# Patient Record
Sex: FEMALE | Race: WHITE | NOT HISPANIC OR LATINO | URBAN - METROPOLITAN AREA
[De-identification: names, ages, dates, MRNs, and addresses within clinical notes are randomized per-mention and may not be internally consistent; named-entity substitution may affect disease eponyms.]

---

## 2017-11-06 ENCOUNTER — INPATIENT (INPATIENT)
Facility: HOSPITAL | Age: 72
LOS: 2 days | Discharge: HOME | DRG: 872 | End: 2017-11-09
Attending: HOSPITALIST | Admitting: HOSPITALIST
Payer: MEDICARE

## 2017-11-06 VITALS
TEMPERATURE: 98 F | SYSTOLIC BLOOD PRESSURE: 172 MMHG | HEART RATE: 101 BPM | DIASTOLIC BLOOD PRESSURE: 90 MMHG | OXYGEN SATURATION: 98 % | WEIGHT: 257.94 LBS | RESPIRATION RATE: 18 BRPM

## 2017-11-06 DIAGNOSIS — S61.401A UNSPECIFIED OPEN WOUND OF RIGHT HAND, INITIAL ENCOUNTER: ICD-10-CM

## 2017-11-06 DIAGNOSIS — Z29.9 ENCOUNTER FOR PROPHYLACTIC MEASURES, UNSPECIFIED: ICD-10-CM

## 2017-11-06 DIAGNOSIS — R63.8 OTHER SYMPTOMS AND SIGNS CONCERNING FOOD AND FLUID INTAKE: ICD-10-CM

## 2017-11-06 DIAGNOSIS — N17.9 ACUTE KIDNEY FAILURE, UNSPECIFIED: ICD-10-CM
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
## 2017-11-06 DIAGNOSIS — M17.0 BILATERAL PRIMARY OSTEOARTHRITIS OF KNEE: ICD-10-CM

## 2017-11-06 DIAGNOSIS — I10 ESSENTIAL (PRIMARY) HYPERTENSION: ICD-10-CM

## 2017-11-06 DIAGNOSIS — L02.519 CUTANEOUS ABSCESS OF UNSPECIFIED HAND: ICD-10-CM

## 2017-11-06 DIAGNOSIS — Z90.710 ACQUIRED ABSENCE OF BOTH CERVIX AND UTERUS: Chronic | ICD-10-CM
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
## 2017-11-06 LAB
ANION GAP SERPL CALC-SCNC: 16 MMOL/L — SIGNIFICANT CHANGE UP (ref 5–17)
BUN SERPL-MCNC: 31 MG/DL — HIGH (ref 7–23)
CALCIUM SERPL-MCNC: 9.3 MG/DL — SIGNIFICANT CHANGE UP (ref 8.4–10.5)
CHLORIDE SERPL-SCNC: 101 MMOL/L — SIGNIFICANT CHANGE UP (ref 96–108)
CO2 SERPL-SCNC: 21 MMOL/L — LOW (ref 22–31)
CREAT SERPL-MCNC: 1.85 MG/DL — HIGH (ref 0.5–1.3)
GLUCOSE SERPL-MCNC: 104 MG/DL — HIGH (ref 70–99)
HCT VFR BLD CALC: 35.2 % — SIGNIFICANT CHANGE UP (ref 34.5–45)
HGB BLD-MCNC: 11.3 G/DL — LOW (ref 11.5–15.5)
MCHC RBC-ENTMCNC: 27.9 PG — SIGNIFICANT CHANGE UP (ref 27–34)
MCHC RBC-ENTMCNC: 32.1 G/DL — SIGNIFICANT CHANGE UP (ref 32–36)
MCV RBC AUTO: 86.9 FL — SIGNIFICANT CHANGE UP (ref 80–100)
PLATELET # BLD AUTO: 175 K/UL — SIGNIFICANT CHANGE UP (ref 150–400)
POTASSIUM SERPL-MCNC: 5.1 MMOL/L — SIGNIFICANT CHANGE UP (ref 3.5–5.3)
POTASSIUM SERPL-SCNC: 5.1 MMOL/L — SIGNIFICANT CHANGE UP (ref 3.5–5.3)
RBC # BLD: 4.05 M/UL — SIGNIFICANT CHANGE UP (ref 3.8–5.2)
RBC # FLD: 12.7 % — SIGNIFICANT CHANGE UP (ref 10.3–16.9)
SODIUM SERPL-SCNC: 138 MMOL/L — SIGNIFICANT CHANGE UP (ref 135–145)
WBC # BLD: 14 K/UL — HIGH (ref 3.8–10.5)
WBC # FLD AUTO: 14 K/UL — HIGH (ref 3.8–10.5)

## 2017-11-06 PROCEDURE — 99285 EMERGENCY DEPT VISIT HI MDM: CPT | Mod: GC

## 2017-11-06 PROCEDURE — 76882 US LMTD JT/FCL EVL NVASC XTR: CPT | Mod: 26,LT

## 2017-11-06 PROCEDURE — 73130 X-RAY EXAM OF HAND: CPT | Mod: 26,LT

## 2017-11-06 RX ORDER — MORPHINE SULFATE 50 MG/1
2 CAPSULE, EXTENDED RELEASE ORAL ONCE
Qty: 0 | Refills: 0 | Status: DISCONTINUED | OUTPATIENT
Start: 2017-11-06 | End: 2017-11-06

## 2017-11-06 RX ORDER — SODIUM CHLORIDE 9 MG/ML
1000 INJECTION INTRAMUSCULAR; INTRAVENOUS; SUBCUTANEOUS
Qty: 0 | Refills: 0 | Status: DISCONTINUED | OUTPATIENT
Start: 2017-11-06 | End: 2017-11-07

## 2017-11-06 RX ORDER — HEPARIN SODIUM 5000 [USP'U]/ML
5000 INJECTION INTRAVENOUS; SUBCUTANEOUS EVERY 8 HOURS
Qty: 0 | Refills: 0 | Status: DISCONTINUED | OUTPATIENT
Start: 2017-11-06 | End: 2017-11-09

## 2017-11-06 RX ORDER — VANCOMYCIN HCL 1 G
1750 VIAL (EA) INTRAVENOUS ONCE
Qty: 0 | Refills: 0 | Status: DISCONTINUED | OUTPATIENT
Start: 2017-11-06 | End: 2017-11-06

## 2017-11-06 RX ORDER — VANCOMYCIN HCL 1 G
1500 VIAL (EA) INTRAVENOUS ONCE
Qty: 0 | Refills: 0 | Status: COMPLETED | OUTPATIENT
Start: 2017-11-06 | End: 2017-11-06

## 2017-11-06 RX ORDER — ACETAMINOPHEN 500 MG
650 TABLET ORAL EVERY 6 HOURS
Qty: 0 | Refills: 0 | Status: DISCONTINUED | OUTPATIENT
Start: 2017-11-06 | End: 2017-11-09

## 2017-11-06 RX ORDER — SODIUM CHLORIDE 9 MG/ML
1000 INJECTION INTRAMUSCULAR; INTRAVENOUS; SUBCUTANEOUS
Qty: 0 | Refills: 0 | Status: DISCONTINUED | OUTPATIENT
Start: 2017-11-06 | End: 2017-11-08

## 2017-11-06 RX ORDER — VANCOMYCIN HCL 1 G
1500 VIAL (EA) INTRAVENOUS ONCE
Qty: 0 | Refills: 0 | Status: DISCONTINUED | OUTPATIENT
Start: 2017-11-06 | End: 2017-11-06

## 2017-11-06 RX ORDER — MORPHINE SULFATE 50 MG/1
2 CAPSULE, EXTENDED RELEASE ORAL EVERY 6 HOURS
Qty: 0 | Refills: 0 | Status: DISCONTINUED | OUTPATIENT
Start: 2017-11-06 | End: 2017-11-07

## 2017-11-06 RX ORDER — LABETALOL HCL 100 MG
200 TABLET ORAL
Qty: 0 | Refills: 0 | Status: DISCONTINUED | OUTPATIENT
Start: 2017-11-06 | End: 2017-11-09

## 2017-11-06 RX ORDER — PROCHLORPERAZINE MALEATE 5 MG
10 TABLET ORAL ONCE
Qty: 0 | Refills: 0 | Status: COMPLETED | OUTPATIENT
Start: 2017-11-06 | End: 2017-11-06

## 2017-11-06 RX ADMIN — MORPHINE SULFATE 2 MILLIGRAM(S): 50 CAPSULE, EXTENDED RELEASE ORAL at 18:31

## 2017-11-06 RX ADMIN — SODIUM CHLORIDE 1000 MILLILITER(S): 9 INJECTION INTRAMUSCULAR; INTRAVENOUS; SUBCUTANEOUS at 18:50

## 2017-11-06 RX ADMIN — Medication 10 MILLIGRAM(S): at 20:15

## 2017-11-06 RX ADMIN — Medication 300 MILLIGRAM(S): at 18:50

## 2017-11-06 NOTE — H&P ADULT - ASSESSMENT
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 She is hemodynamically stable for admission to [facility] for IV antibiotics.

## 2017-11-06 NOTE — ED PROVIDER NOTE - MEDICAL DECISION MAKING DETAILS
WBC 14. US shows multiple fluid collections in hand.  Will likely need admission for IV antibiotics and possible OR for abscess drainage.

## 2017-11-06 NOTE — H&P ADULT - NSHPPHYSICALEXAM_GEN_ALL_CORE
General: NAD, lying in bed, malodorous   HEENT: PERRL, EOMI, MMM  Neck: supple, no JVD  Resp: b/l CTA, no wheezes/rhonchi/crackles   CVS: S1, S2 RRR no murmurs/rubs/gallops  Abd: obese, soft, nt/nd +BS   Extremities: states she has chronic LE edema 2+ edema, chronic venous stasis changes present b/l. wwp, moving all extremities. LUE 4-/5 RUE 5/5, LLE 4-/5, RLE 5/5. L hand: L hand generalized swelling with fluctuance to mid palm, able to flex fingers but not make fist 2/2 pain and swelling. +open wound, not actively draining at this time, +erythema, wwp, ttp. +dorsal hand swelling w/o erythema, mild streaking up forearm.  No crepitus.  No joint involvement, full ROM of wrist.   Neuro: aaox3, no sensory deficits, CN II-XII grossly in tact, motor exam as noted above. Gait deferred in ER. General: NAD, lying in bed, malodorous and mildly anxious   HEENT: PERRL, EOMI, MMM  Neck: supple, no JVD  Resp: b/l CTA, no wheezes/rhonchi/crackles   CVS: S1, S2 RRR no murmurs/rubs/gallops  Abd: obese, soft, nt/nd +BS   Extremities: states she has chronic LE edema 2+ edema, chronic venous stasis changes present b/l. wwp, moving all extremities. LUE 4-/5 RUE 5/5, LLE 4-/5, RLE 5/5. L hand: L hand generalized swelling with fluctuance to mid palm, able to flex fingers but not make fist 2/2 pain and swelling. +open wound, not actively draining at this time, +erythema, wwp, ttp. +dorsal hand swelling w/o erythema, mild streaking up forearm.  No crepitus.  No joint involvement, full ROM of wrist.   Neuro: aaox3, no sensory deficits, CN II-XII grossly in tact, motor exam as noted above. Gait deferred in ER.

## 2017-11-06 NOTE — H&P ADULT - PROBLEM SELECTOR PLAN 2
-pt presents with KATHARINA in setting of unknown hx of CKD, reports some decreased PO intake as well as COX2 inhibitor use for arthritis. F/u U/a, Urine lytes.    -s/p IL NS in ER, c/w IVF overnight at 80cc/hr and f/u Cr in AM, if no improvement consider RP US. Pt is voiding in ER.   -avoid nephrotoxic meds, renally dose vanc

## 2017-11-06 NOTE — ED PROVIDER NOTE - OBJECTIVE STATEMENT
73 yo F with PMH HTN, CVA in 2013 with residual left LE weakness presents with swelling, erythema, and pain of left hand.  She first noticed redness in the hand 4 days ago.  The redness got progressively worse over the next few days and she began to have swelling and pain.  Yesterday, wound opened up and started draining initially clear fluid but now pus mixed with blood. She has had chills at home, no fevers, no CP/SOB, no N/V/D, no dysuria.

## 2017-11-06 NOTE — H&P ADULT - PROBLEM SELECTOR PLAN 1
-pt presents with new L palmar hand abscess with open wound and previously active drainage in ER -brown, foul smelling. No hx of trauma, bite, but pt notes constantly applying pressure to L side of arm while walking, standing 2/2 weakness from CVA which may have been inciting event. No evidence of joint involvement, mild lymphangitic streaking noted on exam, no crepitus, unable to make a fist 2/2 pain but able to fully flex and extend all fingers as well as wrist.   -received Ultrasound in ER with findings noted above, unable to perform CT 2/2 contrast allergy as well as KATHARINA.   -Seen and examined by hand surgeon Dr. Espinoza and per report no I+D or OR as wound is draining. Current recs are IV abx and hand soaks, will f/u additional hand recs in AM.   -s/p IV vancomycin in ER, c/w weight based +renal dosing of Vancomycin  -tylenol + morphine for moderate/severe pain respectively

## 2017-11-06 NOTE — ED PROVIDER NOTE - PHYSICAL EXAMINATION
Constitutional: laying in bed, NAD  Eyes: PERRLA  ENMT: MMM, no pharyngeal erythema  Respiratory: CTABL  Cardiovascular: RRR, S1/S2, no murmurs  Gastrointestinal: soft NT/ND  Extremities: left hand edema, erythema, with streaking up to elbow, open 1x1cm wound on palmar aspect draining pus  Vascular: +2 radial pulses bilaterally

## 2017-11-06 NOTE — ED PROVIDER NOTE - NS ED ROS FT
REVIEW OF SYSTEMS:    CONSTITUTIONAL: No weakness, fevers or chills  EYES/ENT: No visual changes;  No vertigo or throat pain   NECK: No pain or stiffness  RESPIRATORY: No cough, wheezing, hemoptysis; No shortness of breath  CARDIOVASCULAR: No chest pain or palpitations  GASTROINTESTINAL: No abdominal or epigastric pain. No nausea, vomiting, or hematemesis; No diarrhea or constipation. No melena or hematochezia.  GENITOURINARY: No dysuria, frequency or hematuria  NEUROLOGICAL: No numbness or weakness  SKIN: No itching, burning, rashes, or lesions   All other review of systems is negative unless indicated above. REVIEW OF SYSTEMS:    CONSTITUTIONAL: No weakness, no fevers, +chills  EYES/ENT: No visual changes;  No vertigo or throat pain   NECK: No pain or stiffness  RESPIRATORY: No cough, wheezing, hemoptysis; No shortness of breath  CARDIOVASCULAR: No chest pain or palpitations  GASTROINTESTINAL: No abdominal or epigastric pain. No nausea, vomiting, or diarrhea.  GENITOURINARY: No dysuria, frequency or hematuria  NEUROLOGICAL: No numbness, residual left LE weakness  SKIN: No itching, burning, or rashes  All other review of systems is negative unless indicated above.

## 2017-11-06 NOTE — H&P ADULT - NSHPLABSRESULTS_GEN_ALL_CORE
CBC Full  -  ( 06 Nov 2017 16:46 )  WBC Count : 14.0 K/uL  Hemoglobin : 11.3 g/dL  Hematocrit : 35.2 %  Platelet Count - Automated : 175 K/uL  Mean Cell Volume : 86.9 fL  Mean Cell Hemoglobin : 27.9 pg  Mean Cell Hemoglobin Concentration : 32.1 g/dL  Auto Neutrophil # : x  Auto Lymphocyte # : x  Auto Monocyte # : x  Auto Eosinophil # : x  Auto Basophil # : x  Auto Neutrophil % : 82.2 %  Auto Lymphocyte % : 11.2 %  Auto Monocyte % : 5.2 %  Auto Eosinophil % : 1.1 %  Auto Basophil % : 0.3 %  11-06    138  |  101  |  31<H>  ----------------------------<  104<H>  5.1   |  21<L>  |  1.85<H>    Ca    9.3      06 Nov 2017 16:46    Ultrasound of hand:     INTERPRETATION:  Greyscale imaging of L hand performed to assess for   evidence of abscess.    There is cobblestone pattern of superficial subcutaneous tissue with   pockets of fluid and largest collection approximately 1.5cm deep to skin.    Impression:    Abscess and cellulitis of L hand.

## 2017-11-06 NOTE — H&P ADULT - PMH
Essential hypertension    Hemorrhagic cerebrovascular accident (CVA)  2013  Osteoarthritis of both knees, unspecified osteoarthritis type

## 2017-11-06 NOTE — ED ADULT NURSE REASSESSMENT NOTE - NS ED NURSE REASSESS COMMENT FT1
Pt refusing electronic BP reading, states she only wants manual. CORY Morgan attempted performing a manual and pt did not allow nurse to increase cuff over 160 and reading could not be done. Educated pt regarding the importance of taking her BP and pt agreed to let us take her BP properly.

## 2017-11-06 NOTE — ED ADULT NURSE NOTE - OBJECTIVE STATEMENT
Pt c/o swelling, redness and pain to the L hand for the past 4 days. Pt also reports feeling like she had a fever and treating with Tylenol. Pt denies any injury to her L hand. L hand appears with swelling and open wound to the palm of her hand. Bilateral radial pulses are present, equal and strong. Pt reports hx of HTN and CVA in 2013 with residual L sided weakness. NAD, will continue to monitor.

## 2017-11-06 NOTE — H&P ADULT - NSHPSOCIALHISTORY_GEN_ALL_CORE
Pt lives alone in [facility], walks with a walker. Never smoker, does not drink, no drugs. Her HCP is Noni Keyes (good friend) [phone].

## 2017-11-06 NOTE — ED PROVIDER NOTE - PROGRESS NOTE DETAILS
Spoke to hand surgery Dr. Fernandez who wants CT of the hand and will come evaluate for bedside I&D vs OR. Cr 1.8 and patient has contrast allergy so cannot get CT. Cr 1.8 and patient has contrast allergy so cannot get CT.  Bedside US showed multiple fluid collections. Spoke to hand surgery Dr. Fernandez who wants CT of the hand and will come evaluate for bedside I&D vs OR.    Dr. Fernandez numbers [phone], [phone], (phre) [phone] pending hand surgery eval, Dr. Fernandez coming to see patient in ED.  Signed out to Dr. Mccormick and PÉREZ Gandara. pt seen by Dr. Fernandez, no surgical intervention, recommended soaks and IV abx

## 2017-11-06 NOTE — ED PROVIDER NOTE - ATTENDING CONTRIBUTION TO CARE
L hand abscess x several days worsening no fever or chills, unclear injury provoking infection.  R hand dominant.  Pt well, hd stable, L hand swelling fluctuance to mid palm with mild flexion of fingers and dorsal hand swelling with draining wound in mid palm and some mild streaking up forearm.  No crepitus.  Nl cap refill.  No joint involvement.  NVI.  Labs and US obtained showing collection of pus.  Plan IV vancomycin, hand surgery consult and admit to OR for surgery or floor for IV abx.  DO not suspect nec fasc given context.

## 2017-11-06 NOTE — H&P ADULT - HISTORY OF PRESENT ILLNESS
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     Pt was in her USOH few days ago when she noted slight pain, swelling at L palm which progressively worsened. Yesterday she noted it was very swollen, red but because of the marathon was busy to go to see an MD and thought it would improve on its own. Today noted to be even worse, with slight drainage and went to [facility] MD who sent her straight to [facility] ER. In the ER, drainage has worsened as brown, foul and unsure if foul smelling. ROS also positive for some nausea yesterday. Denies fever, chills, vomiting, diarrhea, t/n, CP, shortness of breath, any trauma or bug bites to the area, denies dysuria, blood in urine or change in urinary frequency. This is the first time she has ever had an infection like this. States she applies a lot of pressure to her L extremity 2/2 while standing up, while using the walker and feels this may have been the inciting event. Of note, she does not know of any hx of kidney disease but does take a Lei 2 inhibitor from [facility] for her OA. Also reports decreased PO intake for the last few days.     In the ER: Tmax 99, HR , -172/90-92, RR 18, O2 sat 95-98% on RA. Ultrasound of the hand revealed abscess and cellulitis of the L hand, unable to perform CT w/ IV contrast 2/2 allergy as well as KATHARINA with Cr 1.8. She received vancomycin 1500g x1, morphine 2mg, compazine 10mg x1, NS 1L. She was evaluated by hand surgeon, Dr. Fernandez who per report recommended IV antibiotics and warm soaks w/ no need for OR or I+D as the wound was already actively draining.

## 2017-11-07 LAB
ALBUMIN SERPL ELPH-MCNC: 4 G/DL — SIGNIFICANT CHANGE UP (ref 3.3–5)
ALP SERPL-CCNC: 70 U/L — SIGNIFICANT CHANGE UP (ref 40–120)
ALT FLD-CCNC: 11 U/L — SIGNIFICANT CHANGE UP (ref 10–45)
ANION GAP SERPL CALC-SCNC: 17 MMOL/L — SIGNIFICANT CHANGE UP (ref 5–17)
APPEARANCE UR: CLEAR — SIGNIFICANT CHANGE UP
AST SERPL-CCNC: 18 U/L — SIGNIFICANT CHANGE UP (ref 10–40)
BASOPHILS NFR BLD AUTO: 0.2 % — SIGNIFICANT CHANGE UP (ref 0–2)
BILIRUB DIRECT SERPL-MCNC: <0.2 MG/DL — SIGNIFICANT CHANGE UP (ref 0–0.2)
BILIRUB INDIRECT FLD-MCNC: >0.4 MG/DL — SIGNIFICANT CHANGE UP (ref 0.2–1)
BILIRUB SERPL-MCNC: 0.6 MG/DL — SIGNIFICANT CHANGE UP (ref 0.2–1.2)
BILIRUB UR-MCNC: NEGATIVE — SIGNIFICANT CHANGE UP
BUN SERPL-MCNC: 22 MG/DL — SIGNIFICANT CHANGE UP (ref 7–23)
CALCIUM SERPL-MCNC: 9.4 MG/DL — SIGNIFICANT CHANGE UP (ref 8.4–10.5)
CHLORIDE SERPL-SCNC: 102 MMOL/L — SIGNIFICANT CHANGE UP (ref 96–108)
CO2 SERPL-SCNC: 21 MMOL/L — LOW (ref 22–31)
COLOR SPEC: YELLOW — SIGNIFICANT CHANGE UP
CREAT ?TM UR-MCNC: 19 MG/DL — SIGNIFICANT CHANGE UP
CREAT SERPL-MCNC: 1.56 MG/DL — HIGH (ref 0.5–1.3)
DIFF PNL FLD: (no result)
EOSINOPHIL NFR BLD AUTO: 1.2 % — SIGNIFICANT CHANGE UP (ref 0–6)
GLUCOSE SERPL-MCNC: 95 MG/DL — SIGNIFICANT CHANGE UP (ref 70–99)
GLUCOSE UR QL: NEGATIVE — SIGNIFICANT CHANGE UP
HCT VFR BLD CALC: 34.8 % — SIGNIFICANT CHANGE UP (ref 34.5–45)
HGB BLD-MCNC: 11.1 G/DL — LOW (ref 11.5–15.5)
KETONES UR-MCNC: 15 MG/DL
LEUKOCYTE ESTERASE UR-ACNC: (no result)
LYMPHOCYTES # BLD AUTO: 14.3 % — SIGNIFICANT CHANGE UP (ref 13–44)
MAGNESIUM SERPL-MCNC: 2.3 MG/DL — SIGNIFICANT CHANGE UP (ref 1.6–2.6)
MCHC RBC-ENTMCNC: 27.8 PG — SIGNIFICANT CHANGE UP (ref 27–34)
MCHC RBC-ENTMCNC: 31.9 G/DL — LOW (ref 32–36)
MCV RBC AUTO: 87.2 FL — SIGNIFICANT CHANGE UP (ref 80–100)
MONOCYTES NFR BLD AUTO: 3.6 % — SIGNIFICANT CHANGE UP (ref 2–14)
NEUTROPHILS NFR BLD AUTO: 80.7 % — HIGH (ref 43–77)
NITRITE UR-MCNC: NEGATIVE — SIGNIFICANT CHANGE UP
PH UR: 5.5 — SIGNIFICANT CHANGE UP (ref 5–8)
PLATELET # BLD AUTO: 198 K/UL — SIGNIFICANT CHANGE UP (ref 150–400)
POTASSIUM SERPL-MCNC: 4.6 MMOL/L — SIGNIFICANT CHANGE UP (ref 3.5–5.3)
POTASSIUM SERPL-SCNC: 4.6 MMOL/L — SIGNIFICANT CHANGE UP (ref 3.5–5.3)
PROT SERPL-MCNC: 7.1 G/DL — SIGNIFICANT CHANGE UP (ref 6–8.3)
PROT UR-MCNC: NEGATIVE MG/DL — SIGNIFICANT CHANGE UP
RBC # BLD: 3.99 M/UL — SIGNIFICANT CHANGE UP (ref 3.8–5.2)
RBC # FLD: 13.1 % — SIGNIFICANT CHANGE UP (ref 10.3–16.9)
SODIUM SERPL-SCNC: 140 MMOL/L — SIGNIFICANT CHANGE UP (ref 135–145)
SODIUM UR-SCNC: 55 MMOL/L — SIGNIFICANT CHANGE UP
SP GR SPEC: <=1.005 — SIGNIFICANT CHANGE UP (ref 1–1.03)
UROBILINOGEN FLD QL: 0.2 E.U./DL — SIGNIFICANT CHANGE UP
WBC # BLD: 12 K/UL — HIGH (ref 3.8–10.5)
WBC # FLD AUTO: 12 K/UL — HIGH (ref 3.8–10.5)

## 2017-11-07 PROCEDURE — 99233 SBSQ HOSP IP/OBS HIGH 50: CPT | Mod: GC

## 2017-11-07 RX ORDER — VANCOMYCIN HCL 1 G
1500 VIAL (EA) INTRAVENOUS EVERY 24 HOURS
Qty: 0 | Refills: 0 | Status: DISCONTINUED | OUTPATIENT
Start: 2017-11-07 | End: 2017-11-09

## 2017-11-07 RX ORDER — OXYCODONE AND ACETAMINOPHEN 5; 325 MG/1; MG/1
1 TABLET ORAL EVERY 6 HOURS
Qty: 0 | Refills: 0 | Status: DISCONTINUED | OUTPATIENT
Start: 2017-11-07 | End: 2017-11-07

## 2017-11-07 RX ORDER — MORPHINE SULFATE 50 MG/1
2 CAPSULE, EXTENDED RELEASE ORAL EVERY 6 HOURS
Qty: 0 | Refills: 0 | Status: DISCONTINUED | OUTPATIENT
Start: 2017-11-07 | End: 2017-11-09

## 2017-11-07 RX ORDER — ONDANSETRON 8 MG/1
4 TABLET, FILM COATED ORAL EVERY 6 HOURS
Qty: 0 | Refills: 0 | Status: COMPLETED | OUTPATIENT
Start: 2017-11-07 | End: 2017-11-07

## 2017-11-07 RX ADMIN — MORPHINE SULFATE 2 MILLIGRAM(S): 50 CAPSULE, EXTENDED RELEASE ORAL at 07:21

## 2017-11-07 RX ADMIN — MORPHINE SULFATE 2 MILLIGRAM(S): 50 CAPSULE, EXTENDED RELEASE ORAL at 19:02

## 2017-11-07 RX ADMIN — OXYCODONE AND ACETAMINOPHEN 1 TABLET(S): 5; 325 TABLET ORAL at 13:08

## 2017-11-07 RX ADMIN — HEPARIN SODIUM 5000 UNIT(S): 5000 INJECTION INTRAVENOUS; SUBCUTANEOUS at 00:09

## 2017-11-07 RX ADMIN — ONDANSETRON 4 MILLIGRAM(S): 8 TABLET, FILM COATED ORAL at 04:02

## 2017-11-07 RX ADMIN — MORPHINE SULFATE 2 MILLIGRAM(S): 50 CAPSULE, EXTENDED RELEASE ORAL at 18:07

## 2017-11-07 RX ADMIN — OXYCODONE AND ACETAMINOPHEN 1 TABLET(S): 5; 325 TABLET ORAL at 12:08

## 2017-11-07 RX ADMIN — SODIUM CHLORIDE 80 MILLILITER(S): 9 INJECTION INTRAMUSCULAR; INTRAVENOUS; SUBCUTANEOUS at 00:09

## 2017-11-07 RX ADMIN — Medication 300 MILLIGRAM(S): at 17:30

## 2017-11-07 RX ADMIN — Medication 200 MILLIGRAM(S): at 17:30

## 2017-11-07 RX ADMIN — Medication 200 MILLIGRAM(S): at 00:14

## 2017-11-07 RX ADMIN — Medication 200 MILLIGRAM(S): at 06:41

## 2017-11-07 NOTE — PROGRESS NOTE ADULT - PROBLEM SELECTOR PROBLEM 5
Nutrition, metabolism, and development symptoms Osteoarthritis of both knees, unspecified osteoarthritis type

## 2017-11-07 NOTE — PROGRESS NOTE ADULT - ASSESSMENT
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 She is hemodynamically stable for admission to [facility] for IV antibiotics.

## 2017-11-07 NOTE — PROGRESS NOTE ADULT - PROBLEM SELECTOR PLAN 6
-HSQ     Dispo: JENNIFER -DASH diet in setting of HTN, CVA   -NS at 80cc/hr x12 hours   -replete lytes PRN

## 2017-11-07 NOTE — DIETITIAN INITIAL EVALUATION ADULT. - OTHER INFO
73 y/o female admitted with left hand abscess.Patient admitted to following no restrictions in diet and denied N/V/D.pain from hand abscess reported.Claims the food here all taste like "antimicrobial" Observed 100% intake form tray.Refused to discuss weight history or diet in detail with KEISHA

## 2017-11-07 NOTE — PROGRESS NOTE ADULT - PROBLEM SELECTOR PLAN 4
-previously on YE inhibitor which may be contributing to KATHARINA, tylenol PRN pain for now hypertensive, not c/o pain currently. c/w home medication, labetalol 200 BID

## 2017-11-07 NOTE — PROGRESS NOTE ADULT - PROBLEM SELECTOR PLAN 5
-DASH diet in setting of HTN, CVA   -NS at 80cc/hr x12 hours   -replete lytes PRN -previously on YE inhibitor which may be contributing to KATHARINA, tylenol PRN pain for now

## 2017-11-07 NOTE — DIETITIAN INITIAL EVALUATION ADULT. - ENERGY NEEDS
BMI:48.0,IBW:115lbs+/-10%,235% of IBW/Decreased kcal needs due to Morbid obesity.Moderate protein due to KATHARINA.

## 2017-11-07 NOTE — PROGRESS NOTE ADULT - PROBLEM SELECTOR PLAN 1
-pt presents with new L palmar hand abscess with open wound and previously active drainage in ER -brown, foul smelling. No hx of trauma, bite, but pt notes constantly applying pressure to L side of arm while walking, standing 2/2 weakness from CVA which may have been inciting event. No evidence of joint involvement, mild lymphangitic streaking noted on exam, no crepitus, unable to make a fist 2/2 pain but able to fully flex and extend all fingers as well as wrist.   -Seen and examined by hand surgeon Dr. Espinoza and per report no I+D or OR as wound is draining and US showing no large pocket to be drained but multiple smaller pockets surrounding nerves.  -s/p IV vancomycin in ER, c/w weight based +renal dosing of Vancomycin  -c/w oxycodone for severe pain, patient takes at home. POA- 2/2 cellulitis/abscess- c/w IV abx  fu cultures

## 2017-11-07 NOTE — PROGRESS NOTE ADULT - PROBLEM SELECTOR PLAN 3
hypertensive, not c/o pain currently. c/w home medication, labetalol 200 BID -pt presents with KATHARINA in setting of unknown hx of CKD, reports some decreased PO intake as well as COX2 inhibitor use for arthritis.   -s/p 1 L NS in ER, now on NS 80 cc/hour with improving creat. Continue w/IVF.   -avoid nephrotoxic meds, renally dose vanc

## 2017-11-07 NOTE — PROGRESS NOTE ADULT - PROBLEM SELECTOR PLAN 2
-pt presents with KATHARINA in setting of unknown hx of CKD, reports some decreased PO intake as well as COX2 inhibitor use for arthritis.   -s/p 1 L NS in ER, now on NS 80 cc/hour with improving creat. Continue w/IVF.   -avoid nephrotoxic meds, renally dose vanc -pt presents with new L palmar hand abscess with open wound and previously active drainage in ER -brown, foul smelling. No hx of trauma, bite, but pt notes constantly applying pressure to L side of arm while walking, standing 2/2 weakness from CVA which may have been inciting event. No evidence of joint involvement, mild lymphangitic streaking noted on exam, no crepitus, unable to make a fist 2/2 pain but able to fully flex and extend all fingers as well as wrist.   -Seen and examined by hand surgeon Dr. Espinoza and per report no I+D or OR as wound is draining and US showing no large pocket to be drained but multiple smaller pockets surrounding nerves.  -s/p IV vancomycin in ER, c/w weight based +renal dosing of Vancomycin  -c/w oxycodone for severe pain, patient takes at home.

## 2017-11-07 NOTE — PROGRESS NOTE ADULT - SUBJECTIVE AND OBJECTIVE BOX
O/N Events: Admitted to 7 Ur. [facility]  Subjective: Patient seen and examined at bedside. Reports worsening of her hand pain but still with normal ROM, no change to sensation, no pain in the joints.     REVIEW OF SYSTEMS:    CONSTITUTIONAL: No weakness, fevers or chills  EYES/ENT: No visual changes;  No vertigo or throat pain   NECK: No pain or stiffness  RESPIRATORY: No cough, wheezing, hemoptysis; No shortness of breath  CARDIOVASCULAR: No chest pain or palpitations  GASTROINTESTINAL: No abdominal or epigastric pain. No nausea, vomiting, or hematemesis; No diarrhea or constipation. No melena or hematochezia.  GENITOURINARY: No dysuria, frequency or hematuria  NEUROLOGICAL: No numbness or weakness      VITALS  Vital Signs Last 24 Hrs  T(C): 37.1 (07 Nov 2017 10:23), Max: 37.2 (06 Nov 2017 18:08)  T(F): 98.8 (07 Nov 2017 10:23), Max: 99 (06 Nov 2017 18:08)  HR: 81 (07 Nov 2017 10:23) (80 - 101)  BP: 149/80 (07 Nov 2017 10:23) (149/80 - 186/100)  BP(mean): --  RR: 18 (07 Nov 2017 10:23) (18 - 20)  SpO2: 93% (07 Nov 2017 10:23) (93% - 98%)    I&O's Summary    06 Nov 2017 07:01  -  07 Nov 2017 07:00  --------------------------------------------------------  IN: 640 mL / OUT: 0 mL / NET: 640 mL    07 Nov 2017 07:01  -  07 Nov 2017 14:39  --------------------------------------------------------  IN: 320 mL / OUT: 0 mL / NET: 320 mL        CAPILLARY BLOOD GLUCOSE    PHYSICAL EXAM  General: A&Ox 3; NAD  Head: NC/AT;   Eyes: PERRL; EOMI; anicteric sclera  Neck: Supple; no JVD  Respiratory: CTA B/L; no wheezes/crackles/rales auscultated w/ good air movement  Cardiovascular: Regular rhythm/rate; S1/S2; no gallops or murmurs auscultated  Gastrointestinal: Obese, Soft; NTND w/out rebound tenderness or guarding; bowel sounds normal  Extremities: Bilateral 1+ pitting edema, purple discoloration extending up legs bilaterally consistent with chronic venous stasis.   Skin: Right palmar surface of hand erythematous with central golf ball sized area of fluctuance with white/yellow drainage from central region. Patient has sensation intact over entire hand with no TTP over joints.  Right radial pulse 2+, unable to close hand into fist completely 2/2 pain but otherwise ROM of all joints including wrist intact.       MEDICATIONS  (STANDING):  heparin  Injectable 5000 Unit(s) SubCutaneous every 8 hours  labetalol 200 milliGRAM(s) Oral two times a day  sodium chloride 0.9%. 1000 milliLiter(s) (80 mL/Hr) IV Continuous <Continuous>  vancomycin  IVPB 1500 milliGRAM(s) IV Intermittent every 24 hours    MEDICATIONS  (PRN):  acetaminophen   Tablet. 650 milliGRAM(s) Oral every 6 hours PRN Moderate Pain (4 - 6)  oxyCODONE    5 mG/acetaminophen 325 mG 1 Tablet(s) Oral every 6 hours PRN Severe Pain (7 - 10)      LABS                        11.1   12.0  )-----------( 198      ( 07 Nov 2017 13:24 )             34.8     11-07    140  |  102  |  22  ----------------------------<  95  4.6   |  21<L>  |  1.56<H>    Ca    9.4      07 Nov 2017 13:24  Mg     2.3     11-07    TPro  7.1  /  Alb  4.0  /  TBili  0.6  /  DBili  <0.2  /  AST  18  /  ALT  11  /  AlkPhos  70  11-06    LIVER FUNCTIONS - ( 06 Nov 2017 16:46 )  Alb: 4.0 g/dL / Pro: 7.1 g/dL / ALK PHOS: 70 U/L / ALT: 11 U/L / AST: 18 U/L / GGT: x             Urinalysis Basic - ( 07 Nov 2017 07:08 )    Color: Yellow / Appearance: Clear / SG: <=1.005 / pH: x  Gluc: x / Ketone: 15 mg/dL  / Bili: Negative / Urobili: 0.2 E.U./dL   Blood: x / Protein: NEGATIVE mg/dL / Nitrite: NEGATIVE   Leuk Esterase: Small / RBC: < 5 /HPF / WBC 5-10 /HPF   Sq Epi: x / Non Sq Epi: 5-10 /HPF / Bacteria: Present /HPF            IMAGING/EKG/ETC: reviewed

## 2017-11-07 NOTE — CHART NOTE - NSCHARTNOTEFT_GEN_A_CORE
Upon Nutritional Assessment by the Registered Dietitian your patient was determined to meet criteria / has evidence of the following diagnosis/diagnoses:          [ ]  Mild Protein Calorie Malnutrition        [ ]  Moderate Protein Calorie Malnutrition        [ ] Severe Protein Calorie Malnutrition        [ ] Unspecified Protein Calorie Malnutrition        [ ] Underweight / BMI <19        [x ] Morbid Obesity / BMI > 40      Findings as based on:  •  Comprehensive nutrition assessment and consultation  •  Calorie counts (nutrient intake analysis)  •  Food acceptance and intake status from observations by staff  •  Follow up  •  Patient education  •  Intervention secondary to interdisciplinary rounds  •   concerns      Treatment:    The following diet has been recommended:      PROVIDER Section:     By signing this assessment you are acknowledging and agree with the diagnosis/diagnoses assigned by the Registered Dietitian    Comments:

## 2017-11-08 ENCOUNTER — TRANSCRIPTION ENCOUNTER (OUTPATIENT)
Age: 72
End: 2017-11-08

## 2017-11-08 DIAGNOSIS — A41.9 SEPSIS, UNSPECIFIED ORGANISM: ICD-10-CM

## 2017-11-08 LAB
ANION GAP SERPL CALC-SCNC: 15 MMOL/L — SIGNIFICANT CHANGE UP (ref 5–17)
BUN SERPL-MCNC: 23 MG/DL — SIGNIFICANT CHANGE UP (ref 7–23)
CALCIUM SERPL-MCNC: 9.4 MG/DL — SIGNIFICANT CHANGE UP (ref 8.4–10.5)
CHLORIDE SERPL-SCNC: 101 MMOL/L — SIGNIFICANT CHANGE UP (ref 96–108)
CO2 SERPL-SCNC: 22 MMOL/L — SIGNIFICANT CHANGE UP (ref 22–31)
CREAT SERPL-MCNC: 1.72 MG/DL — HIGH (ref 0.5–1.3)
GLUCOSE SERPL-MCNC: 109 MG/DL — HIGH (ref 70–99)
HCT VFR BLD CALC: 33 % — LOW (ref 34.5–45)
HGB BLD-MCNC: 10.6 G/DL — LOW (ref 11.5–15.5)
MAGNESIUM SERPL-MCNC: 2.2 MG/DL — SIGNIFICANT CHANGE UP (ref 1.6–2.6)
MCHC RBC-ENTMCNC: 27.9 PG — SIGNIFICANT CHANGE UP (ref 27–34)
MCHC RBC-ENTMCNC: 32.1 G/DL — SIGNIFICANT CHANGE UP (ref 32–36)
MCV RBC AUTO: 86.8 FL — SIGNIFICANT CHANGE UP (ref 80–100)
PLATELET # BLD AUTO: 191 K/UL — SIGNIFICANT CHANGE UP (ref 150–400)
POTASSIUM SERPL-MCNC: 4.6 MMOL/L — SIGNIFICANT CHANGE UP (ref 3.5–5.3)
POTASSIUM SERPL-SCNC: 4.6 MMOL/L — SIGNIFICANT CHANGE UP (ref 3.5–5.3)
RBC # BLD: 3.8 M/UL — SIGNIFICANT CHANGE UP (ref 3.8–5.2)
RBC # FLD: 13.3 % — SIGNIFICANT CHANGE UP (ref 10.3–16.9)
SODIUM SERPL-SCNC: 138 MMOL/L — SIGNIFICANT CHANGE UP (ref 135–145)
WBC # BLD: 9.4 K/UL — SIGNIFICANT CHANGE UP (ref 3.8–10.5)
WBC # FLD AUTO: 9.4 K/UL — SIGNIFICANT CHANGE UP (ref 3.8–10.5)

## 2017-11-08 PROCEDURE — 99233 SBSQ HOSP IP/OBS HIGH 50: CPT | Mod: GC

## 2017-11-08 RX ORDER — BACITRACIN ZINC 500 UNIT/G
1 OINTMENT IN PACKET (EA) TOPICAL
Qty: 0 | Refills: 0 | Status: DISCONTINUED | OUTPATIENT
Start: 2017-11-08 | End: 2017-11-09

## 2017-11-08 RX ORDER — LOPERAMIDE HCL 2 MG
2 TABLET ORAL ONCE
Qty: 0 | Refills: 0 | Status: COMPLETED | OUTPATIENT
Start: 2017-11-08 | End: 2017-11-08

## 2017-11-08 RX ADMIN — Medication 650 MILLIGRAM(S): at 01:23

## 2017-11-08 RX ADMIN — MORPHINE SULFATE 2 MILLIGRAM(S): 50 CAPSULE, EXTENDED RELEASE ORAL at 20:23

## 2017-11-08 RX ADMIN — MORPHINE SULFATE 2 MILLIGRAM(S): 50 CAPSULE, EXTENDED RELEASE ORAL at 10:00

## 2017-11-08 RX ADMIN — Medication 650 MILLIGRAM(S): at 01:53

## 2017-11-08 RX ADMIN — Medication 200 MILLIGRAM(S): at 18:31

## 2017-11-08 RX ADMIN — MORPHINE SULFATE 2 MILLIGRAM(S): 50 CAPSULE, EXTENDED RELEASE ORAL at 18:03

## 2017-11-08 RX ADMIN — MORPHINE SULFATE 2 MILLIGRAM(S): 50 CAPSULE, EXTENDED RELEASE ORAL at 09:25

## 2017-11-08 RX ADMIN — Medication 1 APPLICATION(S): at 12:13

## 2017-11-08 RX ADMIN — HEPARIN SODIUM 5000 UNIT(S): 5000 INJECTION INTRAVENOUS; SUBCUTANEOUS at 09:26

## 2017-11-08 RX ADMIN — Medication 1 APPLICATION(S): at 18:03

## 2017-11-08 RX ADMIN — Medication 300 MILLIGRAM(S): at 18:03

## 2017-11-08 RX ADMIN — Medication 200 MILLIGRAM(S): at 09:25

## 2017-11-08 RX ADMIN — Medication 2 MILLIGRAM(S): at 20:16

## 2017-11-08 NOTE — PROGRESS NOTE ADULT - ATTENDING COMMENTS
pt seen and examined by me. case d/w housestaff. agree with VS, PE, assessment and plan as outlined above.
pt seen and examined by me. case d.w housestaff. agree with VS, PE, assessment and plan as outlined above.,

## 2017-11-08 NOTE — OCCUPATIONAL THERAPY INITIAL EVALUATION ADULT - RANGE OF MOTION EXAMINATION, UPPER EXTREMITY
* left upper extremity WFL except left thumb opposition on digits 4-5 75% of AROM/Left UE Passive ROM was WFL  (within functional limits)/Right UE Active ROM was WNL (within normal limits)/Right UE Passive ROM was WNL (within normal limits)/Left UE Active ROM was WFL (within functional limits)

## 2017-11-08 NOTE — DISCHARGE NOTE ADULT - HOSPITAL COURSE
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 She was seen by Dr. Fernandez from hand surgery in the ED who recommended CT w/contrast, but patient was also found to have KATHARINA on labs (creat 1.85) so IV contrast could not be utilized. Instead patient underwent hand US which showed small pockets of fluid surrounding nerves and one large pocket of fluid that eventually opened up spontaneously in ED and started draining. Patient was afebrile with WBC 14, no signs of compartment syndrome or impairment of ROM on exam so IV antibiotics and three times a day hand soaks were recommended. Patient was admitted on 11/6 for IV vancomycin w/renal and weight based dosing . Abscess largely self-drained and no surgical intervention was required. Patient's KATHARINA persisted, baseline unknown since she does not follow with a primary care doctor. Patient's hand has significantly improved with IV antibiotics and she is hemodynamically stable and clinically fit for discharge with an appointment to establish care with primary care provider. She will complete her course of antibiotics. 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 She was seen by Dr. Fernandez from hand surgery in the ED who recommended CT w/contrast, but patient was also found to have KATHARINA on labs (creat 1.85) so IV contrast could not be utilized. Instead patient underwent hand US which showed small pockets of fluid surrounding nerves and one large pocket of fluid that eventually opened up spontaneously in ED and started draining. Patient was afebrile with WBC 14, no signs of compartment syndrome or impairment of ROM on exam so IV antibiotics and three times a day hand soaks were recommended. Patient was admitted on 11/6 for IV vancomycin w/renal and weight based dosing . Abscess largely self-drained and no surgical intervention was required. Patient's KATHARINA persisted. suspect underlying CKD, baseline unknown since she does not follow with a primary care doctor. Patient's hand has significantly improved with IV antibiotics and she is hemodynamically stable and clinically fit for discharge with an appointment to establish care with Dr. Holloway on November 28th at 12:20 PM. She will complete her course of antibiotics. 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 She was seen by Dr. Fernandez from hand surgery in the ED who recommended CT w/contrast, but patient was also found to have KATHARINA on labs (creat 1.85) so IV contrast could not be utilized. Instead patient underwent hand US which showed small pockets of fluid surrounding nerves and one large pocket of fluid that eventually opened up spontaneously in ED and started draining. Patient was afebrile with WBC 14, no signs of compartment syndrome or impairment of ROM on exam so IV antibiotics and three times a day hand soaks were recommended. Patient was admitted on 11/6 for IV vancomycin w/renal and weight based dosing . Abscess largely self-drained and no surgical intervention was required. Patient's KATHARINA persisted. suspect underlying CKD, baseline unknown since she does not follow with a primary care doctor. Patient's hand has significantly improved with IV antibiotics and she is hemodynamically stable and clinically fit for discharge with an appointment to establish care with Dr. Holloway on November 28th at 12:20 PM. She will complete her course of antibiotics, clindamycin, for 10 days as prescribed.

## 2017-11-08 NOTE — PROGRESS NOTE ADULT - ASSESSMENT
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

## 2017-11-08 NOTE — PROGRESS NOTE ADULT - PROBLEM SELECTOR PLAN 2
-pt presents with new L palmar hand abscess with open wound and previously active drainage in ER -brown, foul smelling. No hx of trauma, bite, but pt notes constantly applying pressure to L side of arm while walking, standing 2/2 weakness from CVA which may have been inciting event. No evidence of joint involvement, mild lymphangitic streaking noted on exam, no crepitus, unable to make a fist 2/2 pain but able to fully flex and extend all fingers as well as wrist.   -Seen and examined by hand surgeon Dr. Espinoza and per report no I+D or OR as wound is draining and US showing no large pocket to be drained but multiple smaller pockets surrounding nerves.  -c/w IV vancomycin w/renal dosing, patient refused AM labs but were rescheduled, will re-dose according to CrCl.   -c/w oxycodone for severe pain, patient takes at home. -pt presents with new L palmar hand abscess with open wound and previously active drainage in ER -brown, foul smelling. No hx of trauma, bite, but pt notes constantly applying pressure to L side of arm while walking, standing 2/2 weakness from CVA which may have been inciting event. No evidence of joint involvement, mild lymphangitic streaking noted on exam, no crepitus, unable to make a fist 2/2 pain but able to fully flex and extend all fingers as well as wrist.   -Seen and examined by hand surgeon Dr. Espinoza and per report no I+D or OR as wound is draining and US showing no large pocket to be drained but multiple smaller pockets surrounding nerves.  -c/w IV vancomycin w/renal dosing, patient refused AM labs but were rescheduled, will re-dose according to CrCl.   -c/w morphine for severe pain, patient takes at home. -pt presents with KATHARINA in setting of unknown hx of CKD, reports some decreased PO intake as well as COX2 inhibitor use for arthritis.   -s/p 1 L NS in ER, now on NS 80 cc/hour with improving creat. Continue w/IVF.   -avoid nephrotoxic meds, renally dose vanc

## 2017-11-08 NOTE — DISCHARGE NOTE ADULT - ADDITIONAL INSTRUCTIONS
Please follow up with Dr. Hari Holloway on November 28th at 12:20 PM. This appointment has already been made for you.  His office address is: [address] [place] [address]  Office Phone: [phone]

## 2017-11-08 NOTE — DISCHARGE NOTE ADULT - PATIENT PORTAL LINK FT
“You can access the Follow[facility] Patient Portal, offered by [facility], by registering with the following website: [email or web address]”

## 2017-11-08 NOTE — DISCHARGE NOTE ADULT - MEDICATION SUMMARY - MEDICATIONS TO TAKE
I will START or STAY ON the medications listed below when I get home from the hospital:    labetalol 200 mg oral tablet  -- 1 tab(s) by mouth 2 times a day  -- Indication: For Essential hypertension    Cleocin HCl 300 mg oral capsule  -- 2 cap(s) by mouth 3 times a day   -- Finish all this medication unless otherwise directed by prescriber.  Medication should be taken with plenty of water.    -- Indication: For HAND ABSCESS

## 2017-11-08 NOTE — DISCHARGE NOTE ADULT - CARE PLAN
Principal Discharge DX:	Hand abscess  Goal:	Please continue taking your antibiotics as prescribed.  Instructions for follow-up, activity and diet:	You were admitted with an abscess on your hand. This abscess is likely from repeated friction with using your walker. Please be sure to take care to avoid excessive friction on your hand in the future. Also be sure to complete your course of antibiotics as prescribed. You were seen by a hand surgeon during this admission who did not recommend surgery but did recommend soaking your hand in water and betadine three times a day. Please continue soaking your hand for one more week.  Secondary Diagnosis:	KATHARINA (acute kidney injury)  Goal:	Please follow up with the PCP that has been assigned to you.  Instructions for follow-up, activity and diet:	During admission it was noted that your kidney values were abnormal. This was thought to be due to dehydration, but your kidney values did not improve after you were hydrated in the hospital. It is important that you follow up with your primary care provider to monitor your kidney function and treat your acute kidney injury appropriately.  Secondary Diagnosis:	Osteoarthritis of both knees, unspecified osteoarthritis type  Instructions for follow-up, activity and diet:	Please follow up with your primary care doctor and avoid taking NSAIDS since these can be harmful to your kidneys.  Secondary Diagnosis:	Essential hypertension  Instructions for follow-up, activity and diet:	Please continue taking labetolol 200 mg by mouth daily and follow up with your primary care provider. Principal Discharge DX:	Hand abscess  Goal:	Please continue taking your antibiotics as prescribed.  Instructions for follow-up, activity and diet:	You were admitted with an abscess on your hand. This abscess is likely from repeated friction with using your walker. Please be sure to take care to avoid excessive friction on your hand in the future. Also be sure to complete your course of antibiotics as prescribed. You were seen by a hand surgeon during this admission who did not recommend surgery but did recommend soaking your hand in water and betadine three times a day. Please continue soaking your hand for one more week. For your antibiotics, you will need to take clindamycin, also known as Cleocin HCl, 300 mg oral capsule, 2 cap(s) by mouth 3 times a day for 10 days.  Secondary Diagnosis:	KATHARINA (acute kidney injury)  Goal:	Please follow up with the PCP that has been assigned to you.  Instructions for follow-up, activity and diet:	During admission it was noted that your kidney values were abnormal. This was thought to be due to dehydration, but your kidney values did not improve after you were hydrated in the hospital. It is important that you follow up with your primary care provider to monitor your kidney function and treat your acute kidney injury appropriately.  Secondary Diagnosis:	Osteoarthritis of both knees, unspecified osteoarthritis type  Instructions for follow-up, activity and diet:	Please follow up with your primary care doctor and avoid taking NSAIDS since these can be harmful to your kidneys.  Secondary Diagnosis:	Essential hypertension  Instructions for follow-up, activity and diet:	Please continue taking labetolol 200 mg by mouth daily and follow up with your primary care provider. Principal Discharge DX:	Severe sepsis  Goal:	Please continue taking your antibiotics as prescribed.  Instructions for follow-up, activity and diet:	You were admitted with an abscess on your hand. This abscess is likely from repeated friction with using your walker. Please be sure to take care to avoid excessive friction on your hand in the future. Also be sure to complete your course of antibiotics as prescribed. You were seen by a hand surgeon during this admission who did not recommend surgery but did recommend soaking your hand in water and betadine three times a day. Please continue soaking your hand for one more week. For your antibiotics, you will need to take clindamycin, also known as Cleocin HCl, 300 mg oral capsule, 2 cap(s) by mouth 3 times a day for 10 days.  Secondary Diagnosis:	KATHARINA (acute kidney injury)  Goal:	Please follow up with the PCP that has been assigned to you.  Instructions for follow-up, activity and diet:	During admission it was noted that your kidney values were abnormal. This was thought to be due to dehydration, but your kidney values did not improve after you were hydrated in the hospital. It is important that you follow up with your primary care provider to monitor your kidney function and treat your acute kidney injury appropriately.  Secondary Diagnosis:	Osteoarthritis of both knees, unspecified osteoarthritis type  Instructions for follow-up, activity and diet:	Please follow up with your primary care doctor and avoid taking NSAIDS since these can be harmful to your kidneys.  Secondary Diagnosis:	Essential hypertension  Instructions for follow-up, activity and diet:	Please continue taking labetolol 200 mg by mouth daily and follow up with your primary care provider.

## 2017-11-08 NOTE — PROGRESS NOTE ADULT - PROBLEM SELECTOR PLAN 1
POA- 2/2 cellulitis/abscess. Patient now not meeting criteria as WBC has downtrended and HR improved  -ctm HR and WBC count. Patient refused labs this AM but will allow rescheduling to 10 AM. -pt presents with new L palmar hand abscess with open wound and previously active drainage in ER -brown, foul smelling. No hx of trauma, bite, but pt notes constantly applying pressure to L side of arm while walking, standing 2/2 weakness from CVA which may have been inciting event. No evidence of joint involvement, mild lymphangitic streaking noted on exam, no crepitus, unable to make a fist 2/2 pain but able to fully flex and extend all fingers as well as wrist.   -Seen and examined by hand surgeon Dr. Espinoza and per report no I+D or OR as wound is draining and US showing no large pocket to be drained but multiple smaller pockets surrounding nerves.  -c/w IV vancomycin w/renal dosing, patient refused AM labs but were rescheduled, will re-dose according to CrCl.   -c/w morphine for severe pain, patient takes at home. Present on admission- 2/2 cellulitis/abscess. Patient now not meeting criteria as WBC has downtrended and HR improved  -ctm HR and WBC count. Patient refused labs this AM but will allow rescheduling to 10 AM.

## 2017-11-08 NOTE — DISCHARGE NOTE ADULT - PLAN OF CARE
Please continue taking your antibiotics as prescribed. You were admitted with an abscess on your hand. This abscess is likely from repeated friction with using your walker. Please be sure to take care to avoid excessive friction on your hand in the future. Also be sure to complete your course of antibiotics as prescribed. You were seen by a hand surgeon during this admission who did not recommend surgery but did recommend soaking your hand in water and betadine three times a day. Please continue soaking your hand for one more week. Please follow up with the PCP that has been assigned to you. During admission it was noted that your kidney values were abnormal. This was thought to be due to dehydration, but your kidney values did not improve after you were hydrated in the hospital. It is important that you follow up with your primary care provider to monitor your kidney function and treat your acute kidney injury appropriately. Please follow up with your primary care doctor and avoid taking NSAIDS since these can be harmful to your kidneys. Please continue taking labetolol 200 mg by mouth daily and follow up with your primary care provider. You were admitted with an abscess on your hand. This abscess is likely from repeated friction with using your walker. Please be sure to take care to avoid excessive friction on your hand in the future. Also be sure to complete your course of antibiotics as prescribed. You were seen by a hand surgeon during this admission who did not recommend surgery but did recommend soaking your hand in water and betadine three times a day. Please continue soaking your hand for one more week. For your antibiotics, you will need to take clindamycin, also known as Cleocin HCl, 300 mg oral capsule, 2 cap(s) by mouth 3 times a day for 10 days.

## 2017-11-08 NOTE — PROGRESS NOTE ADULT - PROBLEM SELECTOR PLAN 4
hypertensive, not c/o pain currently. c/w home medication, labetalol 200 BID Present on admission- 2/2 cellulitis/abscess. Patient now not meeting criteria as WBC has downtrended and HR improved  -ctm HR and WBC count. Patient refused labs this AM but will allow rescheduling to 10 AM.

## 2017-11-08 NOTE — PROGRESS NOTE ADULT - SUBJECTIVE AND OBJECTIVE BOX
seen and examined late last night  doing better  induration now consolidating  no role for OR drainage  please continue to soak  will follow

## 2017-11-08 NOTE — CONSULT NOTE ADULT - SUBJECTIVE AND OBJECTIVE BOX
72F with MMP who presents with 5 days of left hand swelling that became red, hot, painful, and started draining  No memory of particular injury nor puncture  afebrile but WBC 14  neurologically at baseline  sensation, perfusion, all intact  ROM limited by pain and swelling  + significant induration with no core fluctuance  + open area spontaneously draining

## 2017-11-08 NOTE — OCCUPATIONAL THERAPY INITIAL EVALUATION ADULT - GENERAL OBSERVATIONS, REHAB EVAL
Right hand dominant. Patient cleared for Occupational Therapy by RN, patient received supine in non-acute distress, +IV heplock, + left hand bandage C/D/I. Patient reports left hand pain rated 10/10 despite medicine, RN aware.

## 2017-11-08 NOTE — CONSULT NOTE ADULT - ASSESSMENT
no indication for I&D as likely to injured critical hand structures and unlikely to yield much fluid given indurated more than fluctuant  US demonstrates tiny loculated collections around neurovascular structures  No evidence of necrotizing infection  + early lymphangitis    Recommend admission to medicine service  ID consult  IV antibiotics  tid soaks with dilute betadine  close management  will take to OR only if indicated  Expect that this will resolve slowly over days  call with any questions or changes  elevate as much as possible  [phone]

## 2017-11-08 NOTE — DISCHARGE NOTE ADULT - SECONDARY DIAGNOSIS.
KATHARINA (acute kidney injury) Osteoarthritis of both knees, unspecified osteoarthritis type Essential hypertension

## 2017-11-08 NOTE — DISCHARGE NOTE ADULT - CARE PROVIDER_API CALL
Hari Holloway), Internal Medicine; Nephrology  [address], [place] [address]  Phone: [phone]  Fax: [phone]

## 2017-11-08 NOTE — PROGRESS NOTE ADULT - SUBJECTIVE AND OBJECTIVE BOX
O/N Events: ROGERIO  Subjective: Patient seen and examined at bedside. Patient reports improvement in her hand infection but is upset that the doctors and nurses "have the audacity to wake me up at late at night and early in the morning" and refused AM labs.  She denies fevers/chills and has full range of motion in her hand with no tingling/numbness, no pain in the joints.     REVIEW OF SYSTEMS:  CONSTITUTIONAL: No weakness, fevers or chills  EYES/ENT: No visual changes;  No vertigo or throat pain   NECK: No pain or stiffness  RESPIRATORY: No cough, wheezing, hemoptysis; No shortness of breath  CARDIOVASCULAR: No chest pain or palpitations  GASTROINTESTINAL: No abdominal or epigastric pain. No nausea, vomiting, or hematemesis; No diarrhea or constipation. No melena or hematochezia.  GENITOURINARY: No dysuria, frequency or hematuria  NEUROLOGICAL: No numbness or weakness      VITALS  Vital Signs Last 24 Hrs  T(C): 36.6 (07 Nov 2017 20:18), Max: 37.1 (07 Nov 2017 10:23)  T(F): 97.8 (07 Nov 2017 20:18), Max: 98.8 (07 Nov 2017 10:23)  HR: 83 (07 Nov 2017 20:18) (81 - 88)  BP: 148/72 (07 Nov 2017 20:18) (148/72 - 158/88)  BP(mean): --  RR: 20 (07 Nov 2017 20:18) (18 - 20)  SpO2: 95% (07 Nov 2017 20:18) (93% - 95%)    I&O's Summary    07 Nov 2017 07:01  -  08 Nov 2017 07:00  --------------------------------------------------------  IN: 820 mL / OUT: 0 mL / NET: 820 mL        CAPILLARY BLOOD GLUCOSE          PHYSICAL EXAM  General: A&Ox 3; NAD  Head: NC/AT;   Eyes: PERRL; EOMI; anicteric sclera  Neck: Supple; no JVD  Respiratory: CTA B/L; no wheezes/crackles/rales auscultated w/ good air movement  Cardiovascular: Regular rhythm/rate; S1/S2; no gallops or murmurs auscultated  Gastrointestinal: Obese, Soft; NTND w/out rebound tenderness or guarding; bowel sounds normal  Extremities: WWP; bilateral 2+ pitting edema with discoloration extending up the legs consistent with venous stasis.   Neurological:  CNII-XII grossly intact; no obvious focal deficits  Skin: left hand with golfball sized area of purulence now without fluctuance, surrounding erythema improved from previous exam. Hand edematous, ROM of hand full with no TTP over DIP, PIP, or MCP. Radial pulse 2+ and sensation to light touch intact.     MEDICATIONS  (STANDING):  heparin  Injectable 5000 Unit(s) SubCutaneous every 8 hours  labetalol 200 milliGRAM(s) Oral two times a day  sodium chloride 0.9%. 1000 milliLiter(s) (80 mL/Hr) IV Continuous <Continuous>  vancomycin  IVPB 1500 milliGRAM(s) IV Intermittent every 24 hours    MEDICATIONS  (PRN):  acetaminophen   Tablet. 650 milliGRAM(s) Oral every 6 hours PRN Moderate Pain (4 - 6)  morphine  - Injectable 2 milliGRAM(s) IV Push every 6 hours PRN Severe Pain (7 - 10)      LABS                        11.1   12.0  )-----------( 198      ( 07 Nov 2017 13:24 )             34.8     11-07    140  |  102  |  22  ----------------------------<  95  4.6   |  21<L>  |  1.56<H>    Ca    9.4      07 Nov 2017 13:24  Mg     2.3     11-07    TPro  7.1  /  Alb  4.0  /  TBili  0.6  /  DBili  <0.2  /  AST  18  /  ALT  11  /  AlkPhos  70  11-06    LIVER FUNCTIONS - ( 06 Nov 2017 16:46 )  Alb: 4.0 g/dL / Pro: 7.1 g/dL / ALK PHOS: 70 U/L / ALT: 11 U/L / AST: 18 U/L / GGT: x             Urinalysis Basic - ( 07 Nov 2017 07:08 )    Color: Yellow / Appearance: Clear / SG: <=1.005 / pH: x  Gluc: x / Ketone: 15 mg/dL  / Bili: Negative / Urobili: 0.2 E.U./dL   Blood: x / Protein: NEGATIVE mg/dL / Nitrite: NEGATIVE   Leuk Esterase: Small / RBC: < 5 /HPF / WBC 5-10 /HPF   Sq Epi: x / Non Sq Epi: 5-10 /HPF / Bacteria: Present /HPF            IMAGING/EKG/ETC: reviewed

## 2017-11-08 NOTE — OCCUPATIONAL THERAPY INITIAL EVALUATION ADULT - MANUAL MUSCLE TESTING RESULTS, REHAB EVAL
left hand pain; right upper extremity 4+/5 throughout; left upper extremity 4/5 throughout (hand  NT)/grossly assessed due to

## 2017-11-08 NOTE — PROGRESS NOTE ADULT - PROBLEM SELECTOR PLAN 3
-pt presents with KATHARINA in setting of unknown hx of CKD, reports some decreased PO intake as well as COX2 inhibitor use for arthritis.   -s/p 1 L NS in ER, now on NS 80 cc/hour with improving creat. Continue w/IVF.   -avoid nephrotoxic meds, renally dose vanc hypertensive, not c/o pain currently. c/w home medication, labetalol 200 BID

## 2017-11-08 NOTE — OCCUPATIONAL THERAPY INITIAL EVALUATION ADULT - PERTINENT HX OF CURRENT PROBLEM, REHAB EVAL
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 Pt was in her USOH few days ago when she noted slight pain, swelling at L palm which progressively worsened. Yesterday she noted it was very swollen, red but because of the marathon was busy to go to see an MD and thought it would improve on its own. Today noted to be even worse, with slight drainage and went to [facility] MD who sent her straight to [facility] ER.

## 2017-11-09 VITALS — HEART RATE: 77 BPM | RESPIRATION RATE: 19 BRPM | OXYGEN SATURATION: 93 % | TEMPERATURE: 99 F

## 2017-11-09 PROBLEM — I61.9 NONTRAUMATIC INTRACEREBRAL HEMORRHAGE, UNSPECIFIED: Chronic | Status: ACTIVE | Noted: 2017-11-06

## 2017-11-09 PROBLEM — I10 ESSENTIAL (PRIMARY) HYPERTENSION: Chronic | Status: ACTIVE | Noted: 2017-11-06

## 2017-11-09 LAB
ANION GAP SERPL CALC-SCNC: 15 MMOL/L — SIGNIFICANT CHANGE UP (ref 5–17)
BUN SERPL-MCNC: 19 MG/DL — SIGNIFICANT CHANGE UP (ref 7–23)
CALCIUM SERPL-MCNC: 9.4 MG/DL — SIGNIFICANT CHANGE UP (ref 8.4–10.5)
CHLORIDE SERPL-SCNC: 101 MMOL/L — SIGNIFICANT CHANGE UP (ref 96–108)
CO2 SERPL-SCNC: 21 MMOL/L — LOW (ref 22–31)
CREAT SERPL-MCNC: 1.74 MG/DL — HIGH (ref 0.5–1.3)
GLUCOSE SERPL-MCNC: 124 MG/DL — HIGH (ref 70–99)
HCT VFR BLD CALC: 34.6 % — SIGNIFICANT CHANGE UP (ref 34.5–45)
HGB BLD-MCNC: 11.2 G/DL — LOW (ref 11.5–15.5)
MAGNESIUM SERPL-MCNC: 2.2 MG/DL — SIGNIFICANT CHANGE UP (ref 1.6–2.6)
MCHC RBC-ENTMCNC: 28.1 PG — SIGNIFICANT CHANGE UP (ref 27–34)
MCHC RBC-ENTMCNC: 32.4 G/DL — SIGNIFICANT CHANGE UP (ref 32–36)
MCV RBC AUTO: 86.9 FL — SIGNIFICANT CHANGE UP (ref 80–100)
PLATELET # BLD AUTO: 197 K/UL — SIGNIFICANT CHANGE UP (ref 150–400)
POTASSIUM SERPL-MCNC: 4.4 MMOL/L — SIGNIFICANT CHANGE UP (ref 3.5–5.3)
POTASSIUM SERPL-SCNC: 4.4 MMOL/L — SIGNIFICANT CHANGE UP (ref 3.5–5.3)
RBC # BLD: 3.98 M/UL — SIGNIFICANT CHANGE UP (ref 3.8–5.2)
RBC # FLD: 13.2 % — SIGNIFICANT CHANGE UP (ref 10.3–16.9)
SODIUM SERPL-SCNC: 137 MMOL/L — SIGNIFICANT CHANGE UP (ref 135–145)
WBC # BLD: 6.9 K/UL — SIGNIFICANT CHANGE UP (ref 3.8–10.5)
WBC # FLD AUTO: 6.9 K/UL — SIGNIFICANT CHANGE UP (ref 3.8–10.5)

## 2017-11-09 PROCEDURE — 99239 HOSP IP/OBS DSCHRG MGMT >30: CPT

## 2017-11-09 RX ORDER — ONDANSETRON 8 MG/1
4 TABLET, FILM COATED ORAL ONCE
Qty: 0 | Refills: 0 | Status: COMPLETED | OUTPATIENT
Start: 2017-11-09 | End: 2017-11-09

## 2017-11-09 RX ORDER — LOPERAMIDE HCL 2 MG
2 TABLET ORAL ONCE
Qty: 0 | Refills: 0 | Status: COMPLETED | OUTPATIENT
Start: 2017-11-09 | End: 2017-11-09

## 2017-11-09 RX ADMIN — ONDANSETRON 4 MILLIGRAM(S): 8 TABLET, FILM COATED ORAL at 06:41

## 2017-11-09 RX ADMIN — Medication 2 MILLIGRAM(S): at 08:41

## 2017-11-09 RX ADMIN — HEPARIN SODIUM 5000 UNIT(S): 5000 INJECTION INTRAVENOUS; SUBCUTANEOUS at 07:44

## 2017-11-09 RX ADMIN — Medication 200 MILLIGRAM(S): at 06:14

## 2017-11-09 RX ADMIN — Medication 1 APPLICATION(S): at 06:14

## 2017-11-09 RX ADMIN — MORPHINE SULFATE 2 MILLIGRAM(S): 50 CAPSULE, EXTENDED RELEASE ORAL at 00:40

## 2017-11-09 RX ADMIN — MORPHINE SULFATE 2 MILLIGRAM(S): 50 CAPSULE, EXTENDED RELEASE ORAL at 10:00

## 2017-11-09 RX ADMIN — MORPHINE SULFATE 2 MILLIGRAM(S): 50 CAPSULE, EXTENDED RELEASE ORAL at 01:45

## 2017-11-09 RX ADMIN — MORPHINE SULFATE 2 MILLIGRAM(S): 50 CAPSULE, EXTENDED RELEASE ORAL at 09:56

## 2017-11-13 DIAGNOSIS — M79.89 OTHER SPECIFIED SOFT TISSUE DISORDERS: ICD-10-CM

## 2017-11-13 DIAGNOSIS — I69.854 HEMIPLEGIA AND HEMIPARESIS FOLLOWING OTHER CEREBROVASCULAR DISEASE AFFECTING LEFT NON-DOMINANT SIDE: ICD-10-CM

## 2017-11-13 DIAGNOSIS — A41.9 SEPSIS, UNSPECIFIED ORGANISM: ICD-10-CM

## 2017-11-13 DIAGNOSIS — E66.01 MORBID (SEVERE) OBESITY DUE TO EXCESS CALORIES: ICD-10-CM

## 2017-11-13 DIAGNOSIS — I12.9 HYPERTENSIVE CHRONIC KIDNEY DISEASE WITH STAGE 1 THROUGH STAGE 4 CHRONIC KIDNEY DISEASE, OR UNSPECIFIED CHRONIC KIDNEY DISEASE: ICD-10-CM

## 2017-11-13 DIAGNOSIS — N18.3 CHRONIC KIDNEY DISEASE, STAGE 3 (MODERATE): ICD-10-CM

## 2017-11-13 DIAGNOSIS — L03.114 CELLULITIS OF LEFT UPPER LIMB: ICD-10-CM

## 2017-11-13 DIAGNOSIS — Z88.0 ALLERGY STATUS TO PENICILLIN: ICD-10-CM

## 2017-11-13 DIAGNOSIS — M17.0 BILATERAL PRIMARY OSTEOARTHRITIS OF KNEE: ICD-10-CM

## 2017-11-13 DIAGNOSIS — N17.9 ACUTE KIDNEY FAILURE, UNSPECIFIED: ICD-10-CM

## 2017-11-13 DIAGNOSIS — R65.20 SEVERE SEPSIS WITHOUT SEPTIC SHOCK: ICD-10-CM

## 2017-11-13 DIAGNOSIS — S61.402A UNSPECIFIED OPEN WOUND OF LEFT HAND, INITIAL ENCOUNTER: ICD-10-CM

## 2017-11-13 DIAGNOSIS — Z88.9 ALLERGY STATUS TO UNSPECIFIED DRUGS, MEDICAMENTS AND BIOLOGICAL SUBSTANCES: ICD-10-CM

## 2017-11-28 ENCOUNTER — APPOINTMENT (OUTPATIENT)
Dept: NEPHROLOGY | Facility: CLINIC | Age: 72
End: 2017-11-28

## 2017-12-19 PROCEDURE — 96375 TX/PRO/DX INJ NEW DRUG ADDON: CPT

## 2017-12-19 PROCEDURE — 76882 US LMTD JT/FCL EVL NVASC XTR: CPT

## 2017-12-19 PROCEDURE — 99285 EMERGENCY DEPT VISIT HI MDM: CPT | Mod: 25

## 2017-12-19 PROCEDURE — 96374 THER/PROPH/DIAG INJ IV PUSH: CPT

## 2017-12-19 PROCEDURE — 36415 COLL VENOUS BLD VENIPUNCTURE: CPT

## 2017-12-19 PROCEDURE — 83735 ASSAY OF MAGNESIUM: CPT

## 2017-12-19 PROCEDURE — 82570 ASSAY OF URINE CREATININE: CPT

## 2017-12-19 PROCEDURE — 80076 HEPATIC FUNCTION PANEL: CPT

## 2017-12-19 PROCEDURE — 84300 ASSAY OF URINE SODIUM: CPT

## 2017-12-19 PROCEDURE — 80048 BASIC METABOLIC PNL TOTAL CA: CPT

## 2017-12-19 PROCEDURE — 97161 PT EVAL LOW COMPLEX 20 MIN: CPT

## 2017-12-19 PROCEDURE — 85025 COMPLETE CBC W/AUTO DIFF WBC: CPT

## 2017-12-19 PROCEDURE — 85027 COMPLETE CBC AUTOMATED: CPT

## 2017-12-19 PROCEDURE — 73130 X-RAY EXAM OF HAND: CPT

## 2017-12-19 PROCEDURE — 81001 URINALYSIS AUTO W/SCOPE: CPT

## 2020-07-25 ENCOUNTER — INPATIENT (INPATIENT)
Facility: HOSPITAL | Age: 75
LOS: 9 days | Discharge: HOSPICE | DRG: 871 | End: 2020-08-04
Attending: INTERNAL MEDICINE | Admitting: INTERNAL MEDICINE
Payer: MEDICARE

## 2020-07-25 VITALS
RESPIRATION RATE: 16 BRPM | OXYGEN SATURATION: 100 % | HEIGHT: 65 IN | DIASTOLIC BLOOD PRESSURE: 81 MMHG | WEIGHT: 182.98 LBS | HEART RATE: 48 BPM | SYSTOLIC BLOOD PRESSURE: 117 MMHG | TEMPERATURE: 97 F

## 2020-07-25 DIAGNOSIS — M17.0 BILATERAL PRIMARY OSTEOARTHRITIS OF KNEE: ICD-10-CM

## 2020-07-25 DIAGNOSIS — I50.30 UNSPECIFIED DIASTOLIC (CONGESTIVE) HEART FAILURE: ICD-10-CM

## 2020-07-25 DIAGNOSIS — Z91.041 RADIOGRAPHIC DYE ALLERGY STATUS: ICD-10-CM

## 2020-07-25 DIAGNOSIS — N19 UNSPECIFIED KIDNEY FAILURE: ICD-10-CM

## 2020-07-25 DIAGNOSIS — A41.9 SEPSIS, UNSPECIFIED ORGANISM: ICD-10-CM

## 2020-07-25 DIAGNOSIS — E66.9 OBESITY, UNSPECIFIED: ICD-10-CM

## 2020-07-25 DIAGNOSIS — Z88.1 ALLERGY STATUS TO OTHER ANTIBIOTIC AGENTS STATUS: ICD-10-CM

## 2020-07-25 DIAGNOSIS — R41.82 ALTERED MENTAL STATUS, UNSPECIFIED: ICD-10-CM

## 2020-07-25 DIAGNOSIS — G92 TOXIC ENCEPHALOPATHY: ICD-10-CM

## 2020-07-25 DIAGNOSIS — N17.0 ACUTE KIDNEY FAILURE WITH TUBULAR NECROSIS: ICD-10-CM

## 2020-07-25 DIAGNOSIS — R59.0 LOCALIZED ENLARGED LYMPH NODES: ICD-10-CM

## 2020-07-25 DIAGNOSIS — N17.9 ACUTE KIDNEY FAILURE, UNSPECIFIED: ICD-10-CM

## 2020-07-25 DIAGNOSIS — D69.6 THROMBOCYTOPENIA, UNSPECIFIED: ICD-10-CM

## 2020-07-25 DIAGNOSIS — G93.41 METABOLIC ENCEPHALOPATHY: ICD-10-CM

## 2020-07-25 DIAGNOSIS — I63.9 CEREBRAL INFARCTION, UNSPECIFIED: ICD-10-CM

## 2020-07-25 DIAGNOSIS — Z51.5 ENCOUNTER FOR PALLIATIVE CARE: ICD-10-CM

## 2020-07-25 DIAGNOSIS — N18.6 END STAGE RENAL DISEASE: ICD-10-CM

## 2020-07-25 DIAGNOSIS — R63.8 OTHER SYMPTOMS AND SIGNS CONCERNING FOOD AND FLUID INTAKE: ICD-10-CM

## 2020-07-25 DIAGNOSIS — I83.12 VARICOSE VEINS OF LEFT LOWER EXTREMITY WITH INFLAMMATION: ICD-10-CM

## 2020-07-25 DIAGNOSIS — I87.2 VENOUS INSUFFICIENCY (CHRONIC) (PERIPHERAL): ICD-10-CM

## 2020-07-25 DIAGNOSIS — I82.442 ACUTE EMBOLISM AND THROMBOSIS OF LEFT TIBIAL VEIN: ICD-10-CM

## 2020-07-25 DIAGNOSIS — Z88.0 ALLERGY STATUS TO PENICILLIN: ICD-10-CM

## 2020-07-25 DIAGNOSIS — R94.31 ABNORMAL ELECTROCARDIOGRAM [ECG] [EKG]: ICD-10-CM

## 2020-07-25 DIAGNOSIS — L03.116 CELLULITIS OF LEFT LOWER LIMB: ICD-10-CM

## 2020-07-25 DIAGNOSIS — Z90.710 ACQUIRED ABSENCE OF BOTH CERVIX AND UTERUS: Chronic | ICD-10-CM

## 2020-07-25 DIAGNOSIS — E87.5 HYPERKALEMIA: ICD-10-CM

## 2020-07-25 DIAGNOSIS — N63.14 UNSPECIFIED LUMP IN THE RIGHT BREAST, LOWER INNER QUADRANT: ICD-10-CM

## 2020-07-25 DIAGNOSIS — I82.412 ACUTE EMBOLISM AND THROMBOSIS OF LEFT FEMORAL VEIN: ICD-10-CM

## 2020-07-25 DIAGNOSIS — I69.254 HEMIPLEGIA AND HEMIPARESIS FOLLOWING OTHER NONTRAUMATIC INTRACRANIAL HEMORRHAGE AFFECTING LEFT NON-DOMINANT SIDE: ICD-10-CM

## 2020-07-25 DIAGNOSIS — E87.2 ACIDOSIS: ICD-10-CM

## 2020-07-25 DIAGNOSIS — I50.32 CHRONIC DIASTOLIC (CONGESTIVE) HEART FAILURE: ICD-10-CM

## 2020-07-25 DIAGNOSIS — N39.0 URINARY TRACT INFECTION, SITE NOT SPECIFIED: ICD-10-CM

## 2020-07-25 DIAGNOSIS — R65.20 SEVERE SEPSIS WITHOUT SEPTIC SHOCK: ICD-10-CM

## 2020-07-25 DIAGNOSIS — B96.1 KLEBSIELLA PNEUMONIAE [K. PNEUMONIAE] AS THE CAUSE OF DISEASES CLASSIFIED ELSEWHERE: ICD-10-CM

## 2020-07-25 DIAGNOSIS — N18.9 CHRONIC KIDNEY DISEASE, UNSPECIFIED: ICD-10-CM

## 2020-07-25 DIAGNOSIS — Z66 DO NOT RESUSCITATE: ICD-10-CM

## 2020-07-25 DIAGNOSIS — B96.20 UNSPECIFIED ESCHERICHIA COLI [E. COLI] AS THE CAUSE OF DISEASES CLASSIFIED ELSEWHERE: ICD-10-CM

## 2020-07-25 DIAGNOSIS — I13.2 HYPERTENSIVE HEART AND CHRONIC KIDNEY DISEASE WITH HEART FAILURE AND WITH STAGE 5 CHRONIC KIDNEY DISEASE, OR END STAGE RENAL DISEASE: ICD-10-CM

## 2020-07-25 DIAGNOSIS — G25.3 MYOCLONUS: ICD-10-CM

## 2020-07-25 LAB
ALBUMIN SERPL ELPH-MCNC: 4.5 G/DL — SIGNIFICANT CHANGE UP (ref 3.3–5)
ALP SERPL-CCNC: 78 U/L — SIGNIFICANT CHANGE UP (ref 40–120)
ALT FLD-CCNC: 13 U/L — SIGNIFICANT CHANGE UP (ref 10–45)
ANION GAP SERPL CALC-SCNC: 28 MMOL/L — HIGH (ref 5–17)
ANISOCYTOSIS BLD QL: SLIGHT — SIGNIFICANT CHANGE UP
APPEARANCE UR: CLEAR — SIGNIFICANT CHANGE UP
APTT BLD: 34.4 SEC — SIGNIFICANT CHANGE UP (ref 27.5–35.5)
AST SERPL-CCNC: 43 U/L — HIGH (ref 10–40)
BACTERIA # UR AUTO: ABNORMAL /HPF
BASE EXCESS BLDV CALC-SCNC: -6.4 MMOL/L — SIGNIFICANT CHANGE UP
BASOPHILS # BLD AUTO: 0 K/UL — SIGNIFICANT CHANGE UP (ref 0–0.2)
BASOPHILS NFR BLD AUTO: 0 % — SIGNIFICANT CHANGE UP (ref 0–2)
BILIRUB SERPL-MCNC: 0.6 MG/DL — SIGNIFICANT CHANGE UP (ref 0.2–1.2)
BILIRUB UR-MCNC: ABNORMAL
BUN SERPL-MCNC: 129 MG/DL — HIGH (ref 7–23)
CA-I SERPL-SCNC: 1 MMOL/L — LOW (ref 1.12–1.3)
CALCIUM SERPL-MCNC: 9.2 MG/DL — SIGNIFICANT CHANGE UP (ref 8.4–10.5)
CHLORIDE SERPL-SCNC: 87 MMOL/L — LOW (ref 96–108)
CO2 SERPL-SCNC: 17 MMOL/L — LOW (ref 22–31)
COLOR SPEC: YELLOW — SIGNIFICANT CHANGE UP
CREAT SERPL-MCNC: 9.03 MG/DL — HIGH (ref 0.5–1.3)
DIFF PNL FLD: ABNORMAL
EOSINOPHIL # BLD AUTO: 0 K/UL — SIGNIFICANT CHANGE UP (ref 0–0.5)
EOSINOPHIL NFR BLD AUTO: 0 % — SIGNIFICANT CHANGE UP (ref 0–6)
EPI CELLS # UR: SIGNIFICANT CHANGE UP /HPF (ref 0–5)
GAS PNL BLDV: 128 MMOL/L — LOW (ref 138–146)
GAS PNL BLDV: SIGNIFICANT CHANGE UP
GIANT PLATELETS BLD QL SMEAR: PRESENT — SIGNIFICANT CHANGE UP
GLUCOSE SERPL-MCNC: 117 MG/DL — HIGH (ref 70–99)
GLUCOSE UR QL: 100
HBV SURFACE AG SER-ACNC: SIGNIFICANT CHANGE UP
HCO3 BLDV-SCNC: 21 MMOL/L — SIGNIFICANT CHANGE UP (ref 20–27)
HCT VFR BLD CALC: 41.1 % — SIGNIFICANT CHANGE UP (ref 34.5–45)
HCV AB S/CO SERPL IA: 0.12 S/CO — SIGNIFICANT CHANGE UP
HCV AB SERPL-IMP: SIGNIFICANT CHANGE UP
HGB BLD-MCNC: 13.2 G/DL — SIGNIFICANT CHANGE UP (ref 11.5–15.5)
INR BLD: 1.15 — SIGNIFICANT CHANGE UP (ref 0.88–1.16)
KETONES UR-MCNC: NEGATIVE — SIGNIFICANT CHANGE UP
LACTATE SERPL-SCNC: 1.8 MMOL/L — SIGNIFICANT CHANGE UP (ref 0.5–2)
LEUKOCYTE ESTERASE UR-ACNC: ABNORMAL
LYMPHOCYTES # BLD AUTO: 13.1 % — SIGNIFICANT CHANGE UP (ref 13–44)
LYMPHOCYTES # BLD AUTO: 3.47 K/UL — HIGH (ref 1–3.3)
MACROCYTES BLD QL: SLIGHT — SIGNIFICANT CHANGE UP
MANUAL SMEAR VERIFICATION: SIGNIFICANT CHANGE UP
MCHC RBC-ENTMCNC: 30.2 PG — SIGNIFICANT CHANGE UP (ref 27–34)
MCHC RBC-ENTMCNC: 32.1 GM/DL — SIGNIFICANT CHANGE UP (ref 32–36)
MCV RBC AUTO: 94.1 FL — SIGNIFICANT CHANGE UP (ref 80–100)
METAMYELOCYTES # FLD: 0.9 % — HIGH (ref 0–0)
MICROCYTES BLD QL: SLIGHT — SIGNIFICANT CHANGE UP
MONOCYTES # BLD AUTO: 0.93 K/UL — HIGH (ref 0–0.9)
MONOCYTES NFR BLD AUTO: 3.5 % — SIGNIFICANT CHANGE UP (ref 2–14)
NEUTROPHILS # BLD AUTO: 21.63 K/UL — HIGH (ref 1.8–7.4)
NEUTROPHILS NFR BLD AUTO: 80.7 % — HIGH (ref 43–77)
NEUTS BAND # BLD: 0.9 % — SIGNIFICANT CHANGE UP (ref 0–8)
NITRITE UR-MCNC: POSITIVE
OVALOCYTES BLD QL SMEAR: SLIGHT — SIGNIFICANT CHANGE UP
PCO2 BLDV: 50 MMHG — SIGNIFICANT CHANGE UP (ref 41–51)
PH BLDV: 7.24 — LOW (ref 7.32–7.43)
PH UR: 7.5 — SIGNIFICANT CHANGE UP (ref 5–8)
PLAT MORPH BLD: ABNORMAL
PLATELET # BLD AUTO: 245 K/UL — SIGNIFICANT CHANGE UP (ref 150–400)
PO2 BLDV: 28 MMHG — SIGNIFICANT CHANGE UP
POTASSIUM BLDV-SCNC: 6.6 MMOL/L — CRITICAL HIGH (ref 3.5–4.9)
POTASSIUM SERPL-MCNC: 7.8 MMOL/L — CRITICAL HIGH (ref 3.5–5.3)
POTASSIUM SERPL-SCNC: 7.8 MMOL/L — CRITICAL HIGH (ref 3.5–5.3)
PROT SERPL-MCNC: 7.6 G/DL — SIGNIFICANT CHANGE UP (ref 6–8.3)
PROT UR-MCNC: >=300 MG/DL
PROTHROM AB SERPL-ACNC: 13.7 SEC — HIGH (ref 10.6–13.6)
RBC # BLD: 4.37 M/UL — SIGNIFICANT CHANGE UP (ref 3.8–5.2)
RBC # FLD: 13 % — SIGNIFICANT CHANGE UP (ref 10.3–14.5)
RBC BLD AUTO: ABNORMAL
RBC CASTS # UR COMP ASSIST: < 5 /HPF — SIGNIFICANT CHANGE UP
SAO2 % BLDV: 41 % — SIGNIFICANT CHANGE UP
SARS-COV-2 RNA SPEC QL NAA+PROBE: SIGNIFICANT CHANGE UP
SMUDGE CELLS # BLD: PRESENT — SIGNIFICANT CHANGE UP
SODIUM SERPL-SCNC: 132 MMOL/L — LOW (ref 135–145)
SP GR SPEC: >=1.03 — SIGNIFICANT CHANGE UP (ref 1–1.03)
SPHEROCYTES BLD QL SMEAR: SLIGHT — SIGNIFICANT CHANGE UP
TROPONIN T SERPL-MCNC: 0.12 NG/ML — CRITICAL HIGH (ref 0–0.01)
UROBILINOGEN FLD QL: 1 E.U./DL — SIGNIFICANT CHANGE UP
VARIANT LYMPHS # BLD: 0.9 % — SIGNIFICANT CHANGE UP (ref 0–6)
WBC # BLD: 26.51 K/UL — HIGH (ref 3.8–10.5)
WBC # FLD AUTO: 26.51 K/UL — HIGH (ref 3.8–10.5)
WBC UR QL: ABNORMAL /HPF

## 2020-07-25 PROCEDURE — 99292 CRITICAL CARE ADDL 30 MIN: CPT | Mod: CS

## 2020-07-25 PROCEDURE — 99233 SBSQ HOSP IP/OBS HIGH 50: CPT

## 2020-07-25 PROCEDURE — 99222 1ST HOSP IP/OBS MODERATE 55: CPT

## 2020-07-25 PROCEDURE — 99233 SBSQ HOSP IP/OBS HIGH 50: CPT | Mod: GC

## 2020-07-25 PROCEDURE — 99291 CRITICAL CARE FIRST HOUR: CPT | Mod: CS

## 2020-07-25 PROCEDURE — 70450 CT HEAD/BRAIN W/O DYE: CPT | Mod: 26

## 2020-07-25 PROCEDURE — 74176 CT ABD & PELVIS W/O CONTRAST: CPT | Mod: 26

## 2020-07-25 PROCEDURE — 71045 X-RAY EXAM CHEST 1 VIEW: CPT | Mod: 26

## 2020-07-25 RX ORDER — CEFTRIAXONE 500 MG/1
1000 INJECTION, POWDER, FOR SOLUTION INTRAMUSCULAR; INTRAVENOUS ONCE
Refills: 0 | Status: COMPLETED | OUTPATIENT
Start: 2020-07-25 | End: 2020-07-25

## 2020-07-25 RX ORDER — RISPERIDONE 4 MG/1
1 TABLET ORAL
Qty: 0 | Refills: 0 | DISCHARGE
Start: 2020-07-25

## 2020-07-25 RX ORDER — SODIUM BICARBONATE 1 MEQ/ML
50 SYRINGE (ML) INTRAVENOUS ONCE
Refills: 0 | Status: COMPLETED | OUTPATIENT
Start: 2020-07-25 | End: 2020-07-25

## 2020-07-25 RX ORDER — DEXTROSE 50 % IN WATER 50 %
50 SYRINGE (ML) INTRAVENOUS ONCE
Refills: 0 | Status: COMPLETED | OUTPATIENT
Start: 2020-07-25 | End: 2020-07-25

## 2020-07-25 RX ORDER — SODIUM ZIRCONIUM CYCLOSILICATE 10 G/10G
10 POWDER, FOR SUSPENSION ORAL ONCE
Refills: 0 | Status: COMPLETED | OUTPATIENT
Start: 2020-07-25 | End: 2020-07-25

## 2020-07-25 RX ORDER — HEPARIN SODIUM 5000 [USP'U]/ML
5000 INJECTION INTRAVENOUS; SUBCUTANEOUS EVERY 8 HOURS
Refills: 0 | Status: DISCONTINUED | OUTPATIENT
Start: 2020-07-25 | End: 2020-07-28

## 2020-07-25 RX ORDER — LABETALOL HCL 100 MG
1 TABLET ORAL
Qty: 0 | Refills: 0 | DISCHARGE

## 2020-07-25 RX ORDER — SODIUM CHLORIDE 9 MG/ML
1000 INJECTION INTRAMUSCULAR; INTRAVENOUS; SUBCUTANEOUS ONCE
Refills: 0 | Status: COMPLETED | OUTPATIENT
Start: 2020-07-25 | End: 2020-07-25

## 2020-07-25 RX ORDER — BUMETANIDE 0.25 MG/ML
1 INJECTION INTRAMUSCULAR; INTRAVENOUS
Qty: 0 | Refills: 0 | DISCHARGE

## 2020-07-25 RX ORDER — CALCIUM GLUCONATE 100 MG/ML
2 VIAL (ML) INTRAVENOUS ONCE
Refills: 0 | Status: COMPLETED | OUTPATIENT
Start: 2020-07-25 | End: 2020-07-25

## 2020-07-25 RX ORDER — RISPERIDONE 4 MG/1
0.5 TABLET ORAL
Refills: 0 | Status: DISCONTINUED | OUTPATIENT
Start: 2020-07-25 | End: 2020-07-27

## 2020-07-25 RX ORDER — INSULIN HUMAN 100 [IU]/ML
5 INJECTION, SOLUTION SUBCUTANEOUS ONCE
Refills: 0 | Status: COMPLETED | OUTPATIENT
Start: 2020-07-25 | End: 2020-07-25

## 2020-07-25 RX ORDER — CEFTRIAXONE 500 MG/1
1000 INJECTION, POWDER, FOR SOLUTION INTRAMUSCULAR; INTRAVENOUS EVERY 24 HOURS
Refills: 0 | Status: COMPLETED | OUTPATIENT
Start: 2020-07-26 | End: 2020-08-01

## 2020-07-25 RX ADMIN — SODIUM ZIRCONIUM CYCLOSILICATE 10 GRAM(S): 10 POWDER, FOR SUSPENSION ORAL at 16:03

## 2020-07-25 RX ADMIN — Medication 50 MILLILITER(S): at 16:03

## 2020-07-25 RX ADMIN — SODIUM CHLORIDE 1000 MILLILITER(S): 9 INJECTION INTRAMUSCULAR; INTRAVENOUS; SUBCUTANEOUS at 16:03

## 2020-07-25 RX ADMIN — INSULIN HUMAN 5 UNIT(S): 100 INJECTION, SOLUTION SUBCUTANEOUS at 15:45

## 2020-07-25 RX ADMIN — Medication 200 GRAM(S): at 15:45

## 2020-07-25 RX ADMIN — Medication 50 MILLIEQUIVALENT(S): at 15:45

## 2020-07-25 RX ADMIN — HEPARIN SODIUM 5000 UNIT(S): 5000 INJECTION INTRAVENOUS; SUBCUTANEOUS at 22:00

## 2020-07-25 RX ADMIN — CEFTRIAXONE 100 MILLIGRAM(S): 500 INJECTION, POWDER, FOR SOLUTION INTRAMUSCULAR; INTRAVENOUS at 15:02

## 2020-07-25 NOTE — ED ADULT TRIAGE NOTE - NS ED NURSE DIRECT TO ROOM YN
Called mother regarding d/c instructions from .  Scheduled follow-up for 03/29/2021  Print out orders for blood work and dexa scan and mailed to patient home.   No

## 2020-07-25 NOTE — H&P ADULT - PROBLEM SELECTOR PLAN 7
- hyperkalemia k 7.8 in ED  - cr 9.03  - unknown baseline Creatinine PMX of Hemorrhagic CVA 2013  Left side weakness  f/u neuro exam

## 2020-07-25 NOTE — ED PROVIDER NOTE - CONSULTANT FREE TEXT FOR MDM DISCUSSED CASE WITH QUESTION
Final Anesthesia Post-op Assessment    Patient: Lara Barnett  Procedure(s) Performed: LEFT BUNIONECTOMY, WITH FIRST METATARSAL OSTEOTOMY - LEFTLEFT 2ND HAMMERTOE REPAIR - LEFTLEFT 2ND METATARSAL OSTEOTOMY, AND FLEXOR TENOTOMY, 3RD, 4TH AND 5TH TOES - LEFT  Anesthesia type: General    Vitals Value Taken Time   Temp 36.6 °C (97.9 °F) 9/12/2019  5:40 PM   Pulse 67 9/12/2019  6:40 PM   Resp 16 9/12/2019  6:40 PM   SpO2 100 % 9/12/2019  6:40 PM   /72 9/12/2019  6:40 PM       Last 24 I/O: No intake or output data in the 24 hours ending 09/12/19 1933    PATIENT LOCATION: Day Surgery  POST-OP VITAL SIGNS: stable  LEVEL OF CONSCIOUSNESS: participates in exam, awake, answers questions appropriately, alert and oriented  RESPIRATORY STATUS: spontaneous ventilation  CARDIOVASCULAR: stable  HYDRATION: euvolemic    PAIN MANAGEMENT: well controlled  NAUSEA: None  AIRWAY PATENCY: patent  POST-OP ASSESSMENT: no complications, patient tolerated procedure well with no complications, no evidence of recall and sufficiently recovered from acute administration of anesthesia effects and able to participate in evaluation  COMPLICATIONS: none       icu, dialysis fellow, vascular surgery

## 2020-07-25 NOTE — ED PROVIDER NOTE - CRITICAL CARE INDICATION, MLM
patient was critically ill... Patient was critically ill with a high probability of imminent or life threatening deterioration.  acute renal failure with hyperkalemia/ekg changes

## 2020-07-25 NOTE — H&P ADULT - NSHPLABSRESULTS_GEN_ALL_CORE
LABS:                         13.2   26.51 >-----< 245           ( 07-25-20 @ 14:09 )             41.1       132  |  87  |   129  ----------------------< 117    (07-25-20 @ 14:09)     7.8  |  17  |  9.03    Anion Gap: 28    Ca   9.2   (07-25-20 @ 14:09)  Mg   3.2   (07-25-20 @ 14:09)  Phos 12.7   (07-25-20 @ 14:09)       TP 9.2     |  AST 4.5    -------------------------     Alb x     |  ALT x               (07-25-20 @ 14:09)  -------------------------     T-bili 4.5  |  AlkPh 78    D-bili x   COAGULATION STUDIES:     aPTT  34.4 sec    (07-25-20 @ 14:09)     PT     13.7 sec    (07-25-20 @ 14:09)     INR    1.15          (07-25-20 @ 14:09)     POCT Blood Glucose.: 128 mg/dL (07-25-20 @ 16:38)    Urinalysis Basic - ( 25 Jul 2020 14:52 )    Color: Yellow / Appearance: Clear / SG: >=1.030 / pH: x  Gluc: x / Ketone: NEGATIVE  / Bili: Moderate / Urobili: 1.0 E.U./dL   Blood: x / Protein: >=300 mg/dL / Nitrite: POSITIVE   Leuk Esterase: Moderate / RBC: < 5 /HPF / WBC Many /HPF   Sq Epi: x / Non Sq Epi: 0-5 /HPF / Bacteria: Many /HPF      I/O SUMMARY:

## 2020-07-25 NOTE — ED PROVIDER NOTE - CLINICAL SUMMARY MEDICAL DECISION MAKING FREE TEXT BOX
present with ams from home.  recent nh admit.  initial history limited/ unclear.  vitals stable except mild tachy, rectal temp neg.  due to ams, tachy, low grade fever, sepsis workup obtained.  labs/cultures, cxr.  notable for leukocytosis, normal lactate, acute renal failure with anion gap acidosis and hyperkalemia.  ecg with peaked t waves.  started insulin/glucose, bicarb, calcium iv, lokelma.  ivf, zabala placed.  purulent urine noted.  started ceftriaxone.  1L ns given.  also with troponin elevation, denies chest pain but unreliable historian, will trend for acs.  dialysis fellow consulted along with icu.  dialysis fellow requested vascular consult for dialysis access placement but per vascular, can be done by icu. additional history obtained from , adult protective services (pt with no family/close contacts), recent nh visit- see notes.  vbg after hyperkalemia meds with improvement but still elevated K. admit to tele for further management. present with ams from home.  recent nh admit.  initial history limited/ unclear.  vitals stable except mild tachy, rectal temp neg.  due to ams, tachy, low grade fever, sepsis workup obtained.  labs/cultures, cxr.  notable for leukocytosis, normal lactate, acute renal failure with anion gap acidosis and hyperkalemia.  ecg with peaked t waves.  started insulin/glucose, bicarb, calcium iv, lokelma.  ivf, zabala placed.  purulent urine noted.  started ceftriaxone.  1L ns given.  also with troponin elevation, denies chest pain but unreliable historian, will trend for acs.  dialysis fellow consulted along with icu.  dialysis fellow requested vascular consult for dialysis access placement but per vascular, can be done by icu. additional history obtained from , adult protective services (pt with no family/close contacts), recent nh visit- see notes.  vbg after hyperkalemia meds with improvement but still elevated K.  ct head done to r/o cva, ct a/p done to r/o renal obstruction. admit to tele for further management.

## 2020-07-25 NOTE — PROCEDURE NOTE - NSICDXPROCEDURE_GEN_ALL_CORE_FT
PROCEDURES:  Insertion, catheter, hemodialysis, non-tunneled, with US guidance 25-Jul-2020 20:46:47  Hussain Min

## 2020-07-25 NOTE — H&P ADULT - PROBLEM SELECTOR PLAN 9
pt withdrawal in pain in LE  Continue to monitor for celulitis 0.8cm solid nodule in lateral R breast noted on CT.   - Correlate w/ outpatient mammogram

## 2020-07-25 NOTE — H&P ADULT - ASSESSMENT
???????????????????????????????????????? Ms. 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 She was admitted for urgent HD medicine telemetry. Ms. 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 She was admitted for urgent HD, medicine telemetry.

## 2020-07-25 NOTE — PROGRESS NOTE ADULT - SUBJECTIVE AND OBJECTIVE BOX
Patient was seen and evaluated on dialysis.   HR: 100 (07-25-20 @ 20:00)  BP: 98/58 (07-25-20 @ 20:00)  Wt(kg): 83 kg  07-25    132<L>  |  87<L>  |  129<H>  ----------------------------<  117<H>  7.8<HH>   |  17<L>  |  9.03<H>    Ca    9.2      25 Jul 2020 14:09  Phos  12.7     07-25  Mg     3.2     07-25    TPro  7.6  /  Alb  4.5  /  TBili  0.6  /  DBili  x   /  AST  43<H>  /  ALT  13  /  AlkPhos  78  07-25    Continue dialysis:   Dialyzer: Optiflux 160        QB: 250 ml/min  K bath: 2  Goal UF: 0  Duration: 180 min    Patient is tolerating the procedure well, shrotly dropped blood pressure which improved with small bolus NS

## 2020-07-25 NOTE — H&P ADULT - PROBLEM SELECTOR PLAN 5
Nitrite +, LEC +, Bacteria +  she received 1 gr ceftriaxone in ED  Continue 1 gr ceftriaxone   f/u urine culture

## 2020-07-25 NOTE — H&P ADULT - NSHPSOCIALHISTORY_GEN_ALL_CORE
Patient is a retired , lives alone, was independent, prior to fall uses a walker. states she does not smoke or drink.

## 2020-07-25 NOTE — ED PROVIDER NOTE - PROGRESS NOTE DETAILS
spoke with emergency contact karuna aquino [phone].  states she is her adult protective services .  pt has no other family.  pt refused to stay in nh and was dc'd 2 days ago to home.  attempting to obtain adult guardianship case management contacted [facility].  able to obtain discharge summary and labs showing admission for arf, weakness, treated with fluids.  labs with creatinine 2.92 on 7/3/20, k 4.3 dialysis fellow at bedside.  rec consult vascular for emergent dialysis access placement/ prep for dialysis today.  vascular consulted, will see patient

## 2020-07-25 NOTE — H&P ADULT - PROBLEM SELECTOR PLAN 8
PMX of Hemorrhagic CVA 2013  Left side weakness  f/u neuro exam pt withdrawal in pain in LE  Continue to monitor for cellulitis

## 2020-07-25 NOTE — H&P ADULT - NSICDXPASTMEDICALHX_GEN_ALL_CORE_FT
PAST MEDICAL HISTORY:  (HFpEF) heart failure with preserved ejection fraction     Acute renal failure (ARF)     Essential hypertension     Hemorrhagic cerebrovascular accident (CVA) 2013    Osteoarthritis of both knees, unspecified osteoarthritis type     Venous stasis dermatitis

## 2020-07-25 NOTE — ED PROVIDER NOTE - CARE PLAN
Principal Discharge DX:	Acute renal failure  Secondary Diagnosis:	Hyperkalemia  Secondary Diagnosis:	UTI (urinary tract infection)

## 2020-07-25 NOTE — CONSULT NOTE ADULT - SUBJECTIVE AND OBJECTIVE BOX
HPI:  75 yo F brought in for AMS, PMH notable for CVA with residual left sided weakness and HTN was recently admitted in [place] after fall and discharged to [facility] and subsequently released home few days ago, her doorman called EMS as he had not seen her for few days, nephrology consulted for labs remarkable for severe kidney injury with Cr 10/ , K 7.8 and Bicarb 17, almost anuric, has minimal UOP which was sent for UA which is grossly active for UTI, pt seen and examined in ED, she is alert and awake but confused, non cooperative with exam, shaky, overloaded with severe b/l LE lymphedema along with cellulitic changes, lungs clear, records obtained form nursing home showing Cr 2.95 from July 4th without electrolyte abnormalities  ECG with, peaked t-waves, 1st degree AV block and QRS prolongation, no records from prior to compare,  on ECG  pt presented bradycardic to 48 upon arrival to ED   pt received hyperkalemia cocktail with calcium gluconate and Lokelma with plan to transfer to [facility] for emergent dialysis  VBG after heper k treatment with K 6.6, Ph 7.24  CT abd/Pel without evidence of hydronephrosis    ROS: unobtainable     PMH/PSH/FH/SH  HTN and CVA from records unable to obatin the rest at present     Allergies: IV contrast, Levaquin and PCN    VITAL SIGNS:  T(C): 36.8 (07-25-20 @ 16:18), Max: 37.3 (07-25-20 @ 14:01)  T(F): 98.2 (07-25-20 @ 16:18), Max: 99.2 (07-25-20 @ 14:01)  HR: 104 (07-25-20 @ 17:35) (48 - 104)  BP: 104/68 (07-25-20 @ 17:35) (104/68 - 118/59)  RR: 20 (07-25-20 @ 17:35) (16 - 20)  SpO2: 97% (07-25-20 @ 17:35) (97% - 100%)    PHYSICAL EXAM:  Constitutional: Awake, confused, NAD on RA   Neck: supple; large right supraclavicular soft mass present, non tender   Respiratory: CTA B/L  Cardiac: sinus tachycardia on the monitor without ectopies, no rubs   Gastrointestinal: soft, obese, ND NT  Extremities: WWP, severe lymphedema with cellulitic changes present b/l   Neurologic: confused, not fully cooperative with exam, possible mild asterixis present, no clonus   Sorenson: with no output     LABS:                         13.2   26.51 )-----------( 245      ( 25 Jul 2020 14:09 )             41.1     07-25    132<L>  |  87<L>  |  129<H>  ----------------------------<  117<H>  7.8<HH>   |  17<L>  |  9.03<H>    Ca    9.2      25 Jul 2020 14:09  Phos  12.7     07-25  Mg     3.2     07-25    TPro  7.6  /  Alb  4.5  /  TBili  0.6  /  DBili  x   /  AST  43<H>  /  ALT  13  /  AlkPhos  78  07-25    PT/INR - ( 25 Jul 2020 14:09 )   PT: 13.7 sec;   INR: 1.15          PTT - ( 25 Jul 2020 14:09 )  PTT:34.4 sec  Urinalysis Basic - ( 25 Jul 2020 14:52 )    Color: Yellow / Appearance: Clear / SG: >=1.030 / pH: x  Gluc: x / Ketone: NEGATIVE  / Bili: Moderate / Urobili: 1.0 E.U./dL   Blood: x / Protein: >=300 mg/dL / Nitrite: POSITIVE   Leuk Esterase: Moderate / RBC: < 5 /HPF / WBC Many /HPF   Sq Epi: x / Non Sq Epi: 0-5 /HPF / Bacteria: Many /HPF    CARDIAC MARKERS ( 25 Jul 2020 14:09 )  x     / 0.12 ng/mL / x     / x     / x          Lactate, Blood: 1.8 mmol/L (07-25 @ 14:08)    RADIOLOGY, EKG & ADDITIONAL TESTS: Reviewed.   CXR clear

## 2020-07-25 NOTE — ED PROVIDER NOTE - CONSTITUTIONAL, MLM
normal... ill appearing, awake, alert, oriented to person, and in no apparent distress.  slow in her thinking, responses.  shakes head yes/no to basic questions but not verbalizing answers

## 2020-07-25 NOTE — ED ADULT NURSE NOTE - OBJECTIVE STATEMENT
BIBA from home for AMS with unknown last normal. Pt is aware of her surrounding, able to state that she's in [facility]. Unable to recall date or time. Pt denies any pain or discomfort, however pt cannot move to her side and requires 2 person assist. Noted BLE with +2 pitting edema with hyperpigmentation and dryness. Noted ecchymosis to left upper back and left thigh. Noted incontinence dermatitis, with multiple stage 2 to sacrum.

## 2020-07-25 NOTE — CONSULT NOTE ADULT - ASSESSMENT
A/p  73 y/o F is getting admitted for altered mental status w/concomitant severe renal injury associated with hyperkalemia and uremic encephalopathy, started on ceftriaxone for grossly positive UA

## 2020-07-25 NOTE — H&P ADULT - HISTORY OF PRESENT ILLNESS
HPI:.............????          Denies fevers, chills, cough, chest pain, dyspnea, nausea, headache, diarrhea, sick contacts. change in urinary or bowel habits  In the ED:    - VS: Tmax: , HR:  , BP:  , RR: , O2: %     - Pertinent Labs:     - Imaging: CXR: CT: US: Cath: EKG:     - Treatment/interventions:     PMHx:   PSHx:  Meds: See med rec  Allergies:  Social: see below Ms. Salazar is a 74 year old female with a past medical history of hemorrhagic CVA 9/2013 with residual L sided weakness, b/l OA of the knees, HTN, CKD who presented to the ED for altered mental status. Ms. Salazar was discharged for [facility] ~4 weeks ago.     Denies fevers, chills, cough, chest pain, dyspnea, nausea, headache, diarrhea, sick contacts. change in urinary or bowel habits  In the ED:    - VS: Tmax: , HR:  , BP:  , RR: , O2: %     - Pertinent Labs:     - Imaging: CXR: CT: US: Cath: EKG:     - Treatment/interventions:     PMHx:   PSHx:  Meds: See med rec  Allergies:  Social: see below Ms. 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 She was recently admitted in [place] after fall and discharged to [facility] after renal failure treatment and subsequently released home few days ago. Her doorman called EMS as he had not seen her for past 2 days and she wasn't picking up food left at her door. She was found down in her apartment and was unable to give any information to EMS. Of note, she is unable to give a clear history at time of interview. ROS could not be obtained due to clinical and mental status.     ED Course:  VS: Tmax: 97.2, HR: 48, BP: 117/81 , RR: 16, O2: 100%. When patient was examined in the ED, she was alert, awake but confused, non cooperative shaky.  Labs was notable for leukocytosis, normal lactate, acute renal failure with anion gap acidosis and hyperkalemia cr 9.03, K 7.8 and Bicarb 17, . ECG with peaked t waves. started insulin/glucose, Lokelma, bicarb, calcium iv, lokelma. K corrected to 6.6  ivf administered, zabala placed.  purulent urine noted.  started ceftriaxone.  1L ns given.  also with troponin elevation, Nephrology consulted for urgent ED and vascular surgery consulted for future fistula placement. She was admitted for urgent HD medicine telemetry.

## 2020-07-25 NOTE — H&P ADULT - PROBLEM SELECTOR PLAN 2
K 7.8 in ED  Decreased to 6.6 after treatment  HD done by nephrology 7/25 night  F/u BMP   f/u nephrology recs History of prior hyperkalemia episode treated medically at [facility], discharged to [facility] for [facility]  - Nephrology consulted for urgent HD and femoral HD catheter placed by pulm fellow  - Vascular consulted for potential fistula placement this admission  - Minimal UOP since admission; however, patient severely dehydrated  - Monitor I's / O's  #Uremia    - No reports of pruritis or poor PO intake; however, patient significantly altered  - BUN in  (as above)  - Monitor BMP  - Monitor for sign of bleeding

## 2020-07-25 NOTE — H&P ADULT - PROBLEM SELECTOR PLAN 1
AAO x 1 in the ED  Metabolic encephalopathy due to ARF/ uremia  Lactate 1.8  AG 28    HD done by nephrology 7/25night  f/u nephrology recs AAO x 1 in the ED  Metabolic encephalopathy due to ARF/ uremia  Lactate 1.8  AG 28    HD done by nephrology 7/25 evening  f/u nephrology recs

## 2020-07-25 NOTE — CONSULT NOTE ADULT - SUBJECTIVE AND OBJECTIVE BOX
Attending:      HPI:  HPI:.............????          Denies fevers, chills, cough, chest pain, dyspnea, nausea, headache, diarrhea, sick contacts. change in urinary or bowel habits  In the ED:    - VS: Tmax: , HR:  , BP:  , RR: , O2: %     - Pertinent Labs:     - Imaging: CXR: CT: US: Cath: EKG:     - Treatment/interventions:     PMHx:   PSHx:  Meds: See med rec  Allergies:  Social: see below (25 Jul 2020 17:15)      PMH:  PSH:  Meds:  Allerg:  SH:  FH:    PAST MEDICAL & SURGICAL HISTORY:  Acute renal failure (ARF)  Venous stasis dermatitis  (HFpEF) heart failure with preserved ejection fraction  Osteoarthritis of both knees, unspecified osteoarthritis type  Hemorrhagic cerebrovascular accident (CVA): 2013  Essential hypertension  H/O: hysterectomy: at age 41      MEDICATIONS  (STANDING):    MEDICATIONS  (PRN):      Allergies    IV Contrast (Anaphylaxis)  Levaquin (Angioedema)  penicillin (Angioedema)    Intolerances        SOCIAL HISTORY:    FAMILY HISTORY:  No pertinent family history in first degree relatives      REVIEW OF SYSTEMS      General:	    Skin/Breast:  	  Ophthalmologic:  	  ENMT:	    Respiratory and Thorax:  	  Cardiovascular:	    Gastrointestinal:	    Genitourinary:	    Musculoskeletal:	    Neurological:	    Psychiatric:	    Hematology/Lymphatics:	    Endocrine:	    Allergic/Immunologic:	    Vital Signs Last 24 Hrs  T(C): 36.8 (25 Jul 2020 16:18), Max: 37.3 (25 Jul 2020 14:01)  T(F): 98.2 (25 Jul 2020 16:18), Max: 99.2 (25 Jul 2020 14:01)  HR: 104 (25 Jul 2020 17:35) (48 - 104)  BP: 104/68 (25 Jul 2020 17:35) (104/68 - 118/59)  BP(mean): --  RR: 20 (25 Jul 2020 17:35) (16 - 20)  SpO2: 97% (25 Jul 2020 17:35) (97% - 100%)    I&O's Summary      Physical Exam:  General: NAD, resting comfortably  HEENT: NC/AT, EOMI, normal hearing, no oral lesions, no LAD, neck supple  Pulmonary: normal resp effort, CTA-B  Cardiovascular: NSR, no murmurs  Abdominal: soft, ND/NT, no organomegaly  Extremities: WWP, normal strength, no clubbing/cyanosis/edema  Neuro: A/O x 3, CNs II-XII grossly intact, normal sensation, no focal deficits  Pulses: palpable distal pulses    Lines/drains/tubes:    LABS:                        13.2   26.51 )-----------( 245      ( 25 Jul 2020 14:09 )             41.1     07-25    132<L>  |  87<L>  |  129<H>  ----------------------------<  117<H>  7.8<HH>   |  17<L>  |  9.03<H>    Ca    9.2      25 Jul 2020 14:09  Phos  12.7     07-25  Mg     3.2     07-25    TPro  7.6  /  Alb  4.5  /  TBili  0.6  /  DBili  x   /  AST  43<H>  /  ALT  13  /  AlkPhos  78  07-25    PT/INR - ( 25 Jul 2020 14:09 )   PT: 13.7 sec;   INR: 1.15          PTT - ( 25 Jul 2020 14:09 )  PTT:34.4 sec  Urinalysis Basic - ( 25 Jul 2020 14:52 )    Color: Yellow / Appearance: Clear / SG: >=1.030 / pH: x  Gluc: x / Ketone: NEGATIVE  / Bili: Moderate / Urobili: 1.0 E.U./dL   Blood: x / Protein: >=300 mg/dL / Nitrite: POSITIVE   Leuk Esterase: Moderate / RBC: < 5 /HPF / WBC Many /HPF   Sq Epi: x / Non Sq Epi: 0-5 /HPF / Bacteria: Many /HPF      CAPILLARY BLOOD GLUCOSE      POCT Blood Glucose.: 128 mg/dL (25 Jul 2020 16:38)    LIVER FUNCTIONS - ( 25 Jul 2020 14:09 )  Alb: 4.5 g/dL / Pro: 7.6 g/dL / ALK PHOS: 78 U/L / ALT: 13 U/L / AST: 43 U/L / GGT: x             Cultures:      RADIOLOGY & ADDITIONAL STUDIES:      Assessment: 74y Female    Recommendations:  -   - discussed with Chief on call  - call /[phone] with questions Attending:      HPI:    Pt came to ED altered and not able to respond to questions. SHe responds only with yes or no and she is unreliable. No proxy available. No accurate Hx is obtained. Right handed. Never knew anything about renal failure or kidney issues in the past.   Vascular consulted for pt in [place] based on labs with altered mental status and no HD access available for urgent dialysis. Pt seen at bedside but was unable to answer questions. He will be transferred to MICU for further treatment.           Denies fevers, chills, cough, chest pain, dyspnea, nausea, headache, diarrhea, sick contacts. change in urinary or bowel habits  In the ED:    - VS: Tmax: , HR:  , BP:  , RR: , O2: %     - Pertinent Labs:     - Imaging: CXR: CT: US: Cath: EKG:     - Treatment/interventions:     PMHx:   PSHx:  Meds: See med rec  Allergies:  Social: see below (25 Jul 2020 17:15)      PAST MEDICAL & SURGICAL HISTORY:  Acute renal failure (ARF)  Venous stasis dermatitis  (HFpEF) heart failure with preserved ejection fraction  Osteoarthritis of both knees, unspecified osteoarthritis type  Hemorrhagic cerebrovascular accident (CVA): 2013  Essential hypertension  H/O: hysterectomy: at age 41      MEDICATIONS  (STANDING):    MEDICATIONS  (PRN):      Allergies    IV Contrast (Anaphylaxis)  Levaquin (Angioedema)  penicillin (Angioedema)    Intolerances        SOCIAL HISTORY:    FAMILY HISTORY:  No pertinent family history in first degree relatives      REVIEW OF SYSTEMS    negative except as above	    Vital Signs Last 24 Hrs  T(C): 36.8 (25 Jul 2020 16:18), Max: 37.3 (25 Jul 2020 14:01)  T(F): 98.2 (25 Jul 2020 16:18), Max: 99.2 (25 Jul 2020 14:01)  HR: 104 (25 Jul 2020 17:35) (48 - 104)  BP: 104/68 (25 Jul 2020 17:35) (104/68 - 118/59)  BP(mean): --  RR: 20 (25 Jul 2020 17:35) (16 - 20)  SpO2: 97% (25 Jul 2020 17:35) (97% - 100%)    I&O's Summary      Physical Exam:  General: NAD, resting comfortably  HEENT: NC/AT, EOMI, normal hearing, no oral lesions, no LAD, mass in L neck area, soft to touch, not painful, non tender to palpation.   Pulmonary: normal resp effort  Cardiovascular: NSR, tachycardic   Abdominal: soft, ND/NT  Extremities: WWP, extensive edema 2+ pitting, with redness and dry skin below the knee b/l, palpable distal pulses, UE with palpable pulses b/l.   Neuro: A/O x 3, CNs II-XII grossly intact, normal sensation, no focal deficits    Lines/drains/tubes:    LABS:                        13.2   26.51 )-----------( 245      ( 25 Jul 2020 14:09 )             41.1     07-25    132<L>  |  87<L>  |  129<H>  ----------------------------<  117<H>  7.8<HH>   |  17<L>  |  9.03<H>    Ca    9.2      25 Jul 2020 14:09  Phos  12.7     07-25  Mg     3.2     07-25    TPro  7.6  /  Alb  4.5  /  TBili  0.6  /  DBili  x   /  AST  43<H>  /  ALT  13  /  AlkPhos  78  07-25    PT/INR - ( 25 Jul 2020 14:09 )   PT: 13.7 sec;   INR: 1.15          PTT - ( 25 Jul 2020 14:09 )  PTT:34.4 sec  Urinalysis Basic - ( 25 Jul 2020 14:52 )    Color: Yellow / Appearance: Clear / SG: >=1.030 / pH: x  Gluc: x / Ketone: NEGATIVE  / Bili: Moderate / Urobili: 1.0 E.U./dL   Blood: x / Protein: >=300 mg/dL / Nitrite: POSITIVE   Leuk Esterase: Moderate / RBC: < 5 /HPF / WBC Many /HPF   Sq Epi: x / Non Sq Epi: 0-5 /HPF / Bacteria: Many /HPF      CAPILLARY BLOOD GLUCOSE      POCT Blood Glucose.: 128 mg/dL (25 Jul 2020 16:38)    LIVER FUNCTIONS - ( 25 Jul 2020 14:09 )  Alb: 4.5 g/dL / Pro: 7.6 g/dL / ALK PHOS: 78 U/L / ALT: 13 U/L / AST: 43 U/L / GGT: x             Cultures:      RADIOLOGY & ADDITIONAL STUDIES:

## 2020-07-25 NOTE — CONSULT NOTE ADULT - ASSESSMENT
Assessment: 74y Female presents with altered mental status and acute renal failure. Admitted to SICU for urgent dialysis.     Recommendations:  - Urgent HD  - Appreciate renal recs  - Call vascular if pt needs permanent access at some point  - Vascular will follow   - discussed with Chief on call  - call /[phone] with questions Assessment: 74y Female presents with altered mental status and acute renal failure. Admitted to SICU for urgent dialysis.     Recommendations:  - Urgent HD  - Appreciate renal recs  - Call vascular if pt needs permanent access at some point  - Pink band on LUE - do not use for IV access  - Vascular will follow   - discussed with Chief on call  - call /[phone] with questions Assessment: 74y Female presents with altered mental status and acute renal failure. Admitted to SICU for urgent dialysis.     Recommendations:  - Urgent HD  - Appreciate renal recs  - Call vascular if pt needs permanent access at some point  - Obtain vein mapping of LUE  - Pink band on LUE - do not use for IV access  - Vascular will follow   - discussed with Chief on call  - call /[phone] with questions

## 2020-07-25 NOTE — H&P ADULT - NSHPPHYSICALEXAM_GEN_ALL_CORE
PHYSICAL EXAM:  ???????????????????????????????????  GENERAL: Pt lying in bed comfortably in NAD  HEAD:  Atraumatic   EYES: EOMI, PERRL, conjunctiva and sclera clear  ENT: Moist mucous membranes  NECK: Supple, No JVD  CHEST/LUNG: Clear to auscultation bilaterally; No rales, rhonchi, wheezing or rubs. Unlabored respirations  HEART: Regular rate and rhythm; No murmurs, rubs, or gallops  ABDOMEN: Bowel sounds present; Soft, Nontender, Nondistended. No guarding or rigidity    EXTREMITIES:  2+ Peripheral Pulses, brisk capillary refill. No clubbing, cyanosis, or edema  NERVOUS SYSTEM:  Alert & Oriented X3, speech clear. Answers questions appropriately. Facial movements symmetrical, no facial droop, tongue protrusion midline. Full and equal 5/5 strength B/L upper and lower extremities. +reflexes B/L LE. Sensation intact. No motor drift. No deficits   MSK: FROM x 4 extremities   SKIN: No rashes or lesions PHYSICAL EXAM:     GENERAL: Awake, confuse, NAD  HEAD:  Atraumatic   EYES: EOMI, PERRL, conjunctiva and sclera clear  NECK: large right supraclavicular soft mass present, non tender  CHEST/LUNG: Clear to auscultation bilaterally; No rales, rhonchi, wheezing or rubs. Unlabored respirations  HEART: Regular rate and rhythm; No murmurs, rubs, or gallops  ABDOMEN: Bowel sounds present; Soft, Nontender, Nondistended. No guarding or rigidity    EXTREMITIES:  WWP,  lymphedema 2+ with cellulitic changes present b/l, redness, dry skin  confused, not fully cooperative with exam, possible mild asterixis present, no clonus   Sorenson: with no output

## 2020-07-25 NOTE — H&P ADULT - PROBLEM SELECTOR PROBLEM 9
Osteoarthritis of both knees, unspecified osteoarthritis type Venous stasis dermatitis Breast nodule

## 2020-07-25 NOTE — ED ADULT TRIAGE NOTE - CHIEF COMPLAINT QUOTE
patient BIBA for AMS unknown onset , as per EMS patient was d/c home from NH last thursday  and doorman in the building noticed that she's not taking her food outside for 2 days .

## 2020-07-25 NOTE — ED PROVIDER NOTE - OBJECTIVE STATEMENT
brought in by ems with altered mental status.  Reportedly discharged from [facility] days ago.  Kiara called ems because he hasn't seen her for past 2 days and she wasn't picking up food left at her door.  Pt unable to provide any coherent history.  Shakes her head no when asked if she has pain, sob, fever.  Unable to state why she is in the ER or what happened recently.

## 2020-07-25 NOTE — ED ADULT NURSE NOTE - PMH
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 This patient has been stable on her current treatment with Kineret since 2012. She has tried and failed Methotrexate and Enbrel. There is evidence to support the use of Kineret in patients with Adult Still's disease. Please review the articles attached and reconsider this decision. We feel it is medically necessary for her to obtain:  Kineret. If you  have any questions regarding this letter of medical necessity, please feel free to call our office.     Sincerely yours,            Josy Cooper MD  Rheumatology  [address], [address] [place] [address]  [phone]
Essential hypertension    Hemorrhagic cerebrovascular accident (CVA)  2013  Osteoarthritis of both knees, unspecified osteoarthritis type

## 2020-07-25 NOTE — CONSULT NOTE ADULT - SUBJECTIVE AND OBJECTIVE BOX
[facility] SERVICE CONSULTATION NOTE    CC:    HPI:  75 yo F with hx of hemorrhagic CVA 9/2013 with residual L sided weakness, b/l OA of the knees, HTN who presented to the ED after her doorman had not seen her in several days and called EMS.  Patient found to have altered mental status.  Was recently discharged from [facility] (attempting to obtain records).     In the ED, VS TMax: 99.2, HR 48 - 90, /81, RR 16, SpO2: 100%  Labs significant for WBC 26.5 with 80% neutrophils, Na 132, K 7.8, HCO3 17, anion Gap 28, BUN/Cr 129/9.03.   CXR showed   EKG showed   Given  In the ED    ROS:  Otherwise negative, except as specified in HPI.    PMH:  HTN  osteoarthritis  Hemorrhagic CVA    PSH:    FH:    SH:    ALLERGIES:    MEDICATIONS:    VITAL SIGNS:  ICU Vital Signs Last 24 Hrs  T(C): 37.3 (25 Jul 2020 14:01), Max: 37.3 (25 Jul 2020 14:01)  T(F): 99.2 (25 Jul 2020 14:01), Max: 99.2 (25 Jul 2020 14:01)  HR: 48 (25 Jul 2020 13:26) (48 - 48)  BP: 117/81 (25 Jul 2020 13:26) (117/81 - 117/81)  BP(mean): --  ABP: --  ABP(mean): --  RR: 16 (25 Jul 2020 13:26) (16 - 16)  SpO2: 100% (25 Jul 2020 13:26) (100% - 100%)    CAPILLARY BLOOD GLUCOSE          PHYSICAL EXAM:  Constitutional: resting comfortably in bed, NAD  HEENT: NC/AT; PERRL, anicteric sclera; no oropharyngeal erythema or exudates; MMM  Neck: supple, no appreciable JVD  Respiratory: CTA B/L, no W/R/R; respirations appear non-labored, conversive in full sentences  Cardiovascular: +S1/S2, RRR  Gastrointestinal: abdomen soft, NT/ND  Extremities: WWP; no clubbing, cyanosis or edema  Vascular: 2+ radial, femoral, and DP/PT pulses B/L  Dermatologic: skin normal color and turgor; no visible rashes  Neurological:     LABS:                        13.2   26.51 )-----------( 245      ( 25 Jul 2020 14:09 )             41.1     07-25    132<L>  |  87<L>  |  129<H>  ----------------------------<  117<H>  7.8<HH>   |  17<L>  |  9.03<H>    Ca    9.2      25 Jul 2020 14:09    TPro  7.6  /  Alb  4.5  /  TBili  0.6  /  DBili  x   /  AST  43<H>  /  ALT  13  /  AlkPhos  78  07-25    PT/INR - ( 25 Jul 2020 14:09 )   PT: 13.7 sec;   INR: 1.15          PTT - ( 25 Jul 2020 14:09 )  PTT:34.4 sec  Lactate, Blood: 1.8 mmol/L (07-25-20 @ 14:08)    CARDIAC MARKERS ( 25 Jul 2020 14:09 )  x     / 0.12 ng/mL / x     / x     / x          Urinalysis Basic - ( 25 Jul 2020 14:52 )    Color: Yellow / Appearance: Clear / SG: >=1.030 / pH: x  Gluc: x / Ketone: NEGATIVE  / Bili: Moderate / Urobili: 1.0 E.U./dL   Blood: x / Protein: >=300 mg/dL / Nitrite: POSITIVE   Leuk Esterase: Moderate / RBC: < 5 /HPF / WBC Many /HPF   Sq Epi: x / Non Sq Epi: 0-5 /HPF / Bacteria: Many /HPF          EKG: Reviewed.    RADIOLOGY & ADDITIONAL TESTS: Reviewed.

## 2020-07-25 NOTE — H&P ADULT - PROBLEM SELECTOR PLAN 3
BUN in   Monitor BMP   Monitor for sign of bleeding K 7.8 in ED  Decreased to 6.6 after treatment  HD done by nephrology 7/25 night  F/u BMP   f/u nephrology recs

## 2020-07-26 DIAGNOSIS — N63.0 UNSPECIFIED LUMP IN UNSPECIFIED BREAST: ICD-10-CM

## 2020-07-26 DIAGNOSIS — G25.3 MYOCLONUS: ICD-10-CM

## 2020-07-26 PROBLEM — M17.0 BILATERAL PRIMARY OSTEOARTHRITIS OF KNEE: Chronic | Status: ACTIVE | Noted: 2017-11-06

## 2020-07-26 LAB
ALBUMIN SERPL ELPH-MCNC: 3.2 G/DL — LOW (ref 3.3–5)
ALP SERPL-CCNC: 56 U/L — SIGNIFICANT CHANGE UP (ref 40–120)
ALT FLD-CCNC: 10 U/L — SIGNIFICANT CHANGE UP (ref 10–45)
ANION GAP SERPL CALC-SCNC: 21 MMOL/L — HIGH (ref 5–17)
ANION GAP SERPL CALC-SCNC: 21 MMOL/L — HIGH (ref 5–17)
AST SERPL-CCNC: 29 U/L — SIGNIFICANT CHANGE UP (ref 10–40)
BILIRUB SERPL-MCNC: 0.4 MG/DL — SIGNIFICANT CHANGE UP (ref 0.2–1.2)
BUN SERPL-MCNC: 78 MG/DL — HIGH (ref 7–23)
BUN SERPL-MCNC: 85 MG/DL — HIGH (ref 7–23)
CALCIUM SERPL-MCNC: 7.8 MG/DL — LOW (ref 8.4–10.5)
CALCIUM SERPL-MCNC: 8.6 MG/DL — SIGNIFICANT CHANGE UP (ref 8.4–10.5)
CHLORIDE SERPL-SCNC: 94 MMOL/L — LOW (ref 96–108)
CHLORIDE SERPL-SCNC: 98 MMOL/L — SIGNIFICANT CHANGE UP (ref 96–108)
CK MB CFR SERPL CALC: 12.7 NG/ML — HIGH (ref 0–6.7)
CK SERPL-CCNC: 1070 U/L — HIGH (ref 25–170)
CK SERPL-CCNC: 1344 U/L — HIGH (ref 25–170)
CO2 SERPL-SCNC: 18 MMOL/L — LOW (ref 22–31)
CO2 SERPL-SCNC: 19 MMOL/L — LOW (ref 22–31)
CORTIS AM PEAK SERPL-MCNC: 23.4 UG/DL — SIGNIFICANT CHANGE UP (ref 3.9–37.5)
CREAT SERPL-MCNC: 6.11 MG/DL — HIGH (ref 0.5–1.3)
CREAT SERPL-MCNC: 6.18 MG/DL — HIGH (ref 0.5–1.3)
GLUCOSE SERPL-MCNC: 102 MG/DL — HIGH (ref 70–99)
GLUCOSE SERPL-MCNC: 87 MG/DL — SIGNIFICANT CHANGE UP (ref 70–99)
GRAM STN FLD: SIGNIFICANT CHANGE UP
HBV SURFACE AB SER-ACNC: SIGNIFICANT CHANGE UP
HCT VFR BLD CALC: 31.2 % — LOW (ref 34.5–45)
HGB BLD-MCNC: 9.9 G/DL — LOW (ref 11.5–15.5)
LDH SERPL L TO P-CCNC: 281 U/L — HIGH (ref 50–242)
MAGNESIUM SERPL-MCNC: 2.3 MG/DL — SIGNIFICANT CHANGE UP (ref 1.6–2.6)
MAGNESIUM SERPL-MCNC: 2.3 MG/DL — SIGNIFICANT CHANGE UP (ref 1.6–2.6)
MCHC RBC-ENTMCNC: 30 PG — SIGNIFICANT CHANGE UP (ref 27–34)
MCHC RBC-ENTMCNC: 31.7 GM/DL — LOW (ref 32–36)
MCV RBC AUTO: 94.5 FL — SIGNIFICANT CHANGE UP (ref 80–100)
NRBC # BLD: 0 /100 WBCS — SIGNIFICANT CHANGE UP (ref 0–0)
PHOSPHATE SERPL-MCNC: 7 MG/DL — HIGH (ref 2.5–4.5)
PHOSPHATE SERPL-MCNC: 7.4 MG/DL — HIGH (ref 2.5–4.5)
PLATELET # BLD AUTO: 143 K/UL — LOW (ref 150–400)
POTASSIUM SERPL-MCNC: 5.2 MMOL/L — SIGNIFICANT CHANGE UP (ref 3.5–5.3)
POTASSIUM SERPL-MCNC: 5.6 MMOL/L — HIGH (ref 3.5–5.3)
POTASSIUM SERPL-SCNC: 5.2 MMOL/L — SIGNIFICANT CHANGE UP (ref 3.5–5.3)
POTASSIUM SERPL-SCNC: 5.6 MMOL/L — HIGH (ref 3.5–5.3)
PROT SERPL-MCNC: 5.4 G/DL — LOW (ref 6–8.3)
RBC # BLD: 3.3 M/UL — LOW (ref 3.8–5.2)
RBC # BLD: 3.3 M/UL — LOW (ref 3.8–5.2)
RBC # FLD: 13.1 % — SIGNIFICANT CHANGE UP (ref 10.3–14.5)
RETICS #: 46.4 K/UL — SIGNIFICANT CHANGE UP (ref 25–125)
RETICS/RBC NFR: 1.5 % — SIGNIFICANT CHANGE UP (ref 0.5–2.5)
SODIUM SERPL-SCNC: 133 MMOL/L — LOW (ref 135–145)
SODIUM SERPL-SCNC: 138 MMOL/L — SIGNIFICANT CHANGE UP (ref 135–145)
TROPONIN T SERPL-MCNC: 0.09 NG/ML — CRITICAL HIGH (ref 0–0.01)
TSH SERPL-MCNC: 1.01 UIU/ML — SIGNIFICANT CHANGE UP (ref 0.35–4.94)
URATE SERPL-MCNC: 9.3 MG/DL — HIGH (ref 2.5–7)
WBC # BLD: 14.22 K/UL — HIGH (ref 3.8–10.5)
WBC # FLD AUTO: 14.22 K/UL — HIGH (ref 3.8–10.5)

## 2020-07-26 PROCEDURE — 93970 EXTREMITY STUDY: CPT | Mod: 26

## 2020-07-26 PROCEDURE — 90935 HEMODIALYSIS ONE EVALUATION: CPT

## 2020-07-26 PROCEDURE — 99233 SBSQ HOSP IP/OBS HIGH 50: CPT | Mod: GC

## 2020-07-26 RX ORDER — SODIUM CHLORIDE 9 MG/ML
1000 INJECTION INTRAMUSCULAR; INTRAVENOUS; SUBCUTANEOUS ONCE
Refills: 0 | Status: COMPLETED | OUTPATIENT
Start: 2020-07-26 | End: 2020-07-26

## 2020-07-26 RX ORDER — SODIUM CHLORIDE 9 MG/ML
500 INJECTION INTRAMUSCULAR; INTRAVENOUS; SUBCUTANEOUS ONCE
Refills: 0 | Status: COMPLETED | OUTPATIENT
Start: 2020-07-26 | End: 2020-07-26

## 2020-07-26 RX ORDER — ALBUMIN HUMAN 25 %
50 VIAL (ML) INTRAVENOUS
Refills: 0 | Status: DISCONTINUED | OUTPATIENT
Start: 2020-07-26 | End: 2020-08-03

## 2020-07-26 RX ORDER — SODIUM CHLORIDE 9 MG/ML
250 INJECTION INTRAMUSCULAR; INTRAVENOUS; SUBCUTANEOUS ONCE
Refills: 0 | Status: COMPLETED | OUTPATIENT
Start: 2020-07-26 | End: 2020-07-26

## 2020-07-26 RX ORDER — SODIUM ZIRCONIUM CYCLOSILICATE 10 G/10G
10 POWDER, FOR SUSPENSION ORAL EVERY 8 HOURS
Refills: 0 | Status: DISCONTINUED | OUTPATIENT
Start: 2020-07-26 | End: 2020-07-26

## 2020-07-26 RX ADMIN — SODIUM CHLORIDE 3000 MILLILITER(S): 9 INJECTION INTRAMUSCULAR; INTRAVENOUS; SUBCUTANEOUS at 05:10

## 2020-07-26 RX ADMIN — CEFTRIAXONE 100 MILLIGRAM(S): 500 INJECTION, POWDER, FOR SOLUTION INTRAMUSCULAR; INTRAVENOUS at 14:37

## 2020-07-26 RX ADMIN — HEPARIN SODIUM 5000 UNIT(S): 5000 INJECTION INTRAVENOUS; SUBCUTANEOUS at 23:09

## 2020-07-26 RX ADMIN — HEPARIN SODIUM 5000 UNIT(S): 5000 INJECTION INTRAVENOUS; SUBCUTANEOUS at 07:00

## 2020-07-26 RX ADMIN — SODIUM CHLORIDE 3000 MILLILITER(S): 9 INJECTION INTRAMUSCULAR; INTRAVENOUS; SUBCUTANEOUS at 04:30

## 2020-07-26 RX ADMIN — Medication 50 MILLILITER(S): at 21:10

## 2020-07-26 RX ADMIN — HEPARIN SODIUM 5000 UNIT(S): 5000 INJECTION INTRAVENOUS; SUBCUTANEOUS at 14:37

## 2020-07-26 RX ADMIN — SODIUM CHLORIDE 500 MILLILITER(S): 9 INJECTION INTRAMUSCULAR; INTRAVENOUS; SUBCUTANEOUS at 12:24

## 2020-07-26 RX ADMIN — SODIUM CHLORIDE 1000 MILLILITER(S): 9 INJECTION INTRAMUSCULAR; INTRAVENOUS; SUBCUTANEOUS at 03:30

## 2020-07-26 NOTE — PROGRESS NOTE ADULT - ASSESSMENT
Ms. 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 She was admitted for urgent HD, medicine telemetry. Ms. 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 She was admitted for urgent HD, medicine telemetry. Now s/p HD x1, hemodynamically stable and will be transferred to [facility] for further management. Ms. 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 She was admitted for urgent HD, medicine telemetry. Now s/p HD x1.

## 2020-07-26 NOTE — PROVIDER CONTACT NOTE (CHANGE IN STATUS NOTIFICATION) - ASSESSMENT
patient awake and alert in bed, responding with "Yes" to name only.  BP 94/59 (67), HR 96, RR 18, 02 98% on room air.  asks patient if she felt her arm shaking she responded with "yes" asked patient if she is in any pain, she did not respond.

## 2020-07-26 NOTE — PROGRESS NOTE ADULT - SUBJECTIVE AND OBJECTIVE BOX
Subjective: Pt seen at the bedside today. Appears very somnolent and unable to verbally answer any questions.    cefTRIAXone   IVPB 1000  cefTRIAXone   IVPB 1000  heparin   Injectable 5000      Allergies    IV Contrast (Anaphylaxis)  Levaquin (Angioedema)  penicillin (Angioedema)    Intolerances        Vital Signs Last 24 Hrs  T(C): 36.9 (26 Jul 2020 13:13), Max: 36.9 (26 Jul 2020 10:07)  T(F): 98.5 (26 Jul 2020 13:13), Max: 98.5 (26 Jul 2020 13:13)  HR: 96 (26 Jul 2020 12:58) (86 - 108)  BP: 94/59 (26 Jul 2020 12:58) (60/35 - 152/84)  BP(mean): 67 (26 Jul 2020 12:58) (43 - 80)  RR: 18 (26 Jul 2020 12:58) (16 - 22)  SpO2: 100% (26 Jul 2020 12:58) (95% - 100%)  I&O's Summary    25 Jul 2020 07:01  -  26 Jul 2020 07:00  --------------------------------------------------------  IN: 2650 mL / OUT: 610 mL / NET: 2040 mL    General: NAD, resting comfortably. Somnolent and tired appearance.  HEENT: Soft mass in neck area measuring approximately 8x8x8 cm. Mass is nontender and fully intact without any discharge, ulcerations, or lesions. Mass is not pulsatile.  Pulmonary: normal resp effort  Cardiovascular: NSR, soft pressure on monitor  Abdominal: soft.  Extremities: Lower leg edema present bilaterally. Palpable DP/PT pulses bilaterally. Triphasic radial and ulnar pulses bilaterally.  WWP, extensive edema 2+ pitting, with redness and dry skin below the knee b/l, palpable distal pulses, UE with palpable pulses b/l.             LABS:                        9.9    14.22 )-----------( 143      ( 26 Jul 2020 07:21 )             31.2     07-26    138  |  98  |  78<H>  ----------------------------<  87  5.2   |  19<L>  |  6.18<H>    Ca    7.8<L>      26 Jul 2020 06:10  Phos  7.4     07-26  Mg     2.3     07-26    TPro  5.4<L>  /  Alb  3.2<L>  /  TBili  0.4  /  DBili  x   /  AST  29  /  ALT  10  /  AlkPhos  56  07-26    PT/INR - ( 25 Jul 2020 14:09 )   PT: 13.7 sec;   INR: 1.15          PTT - ( 25 Jul 2020 14:09 )  PTT:34.4 sec    Radiology and Additional Studies:

## 2020-07-26 NOTE — PROGRESS NOTE ADULT - ASSESSMENT
Assessment: Ms. Salazar is a 74yF who is presenting with altered mental status and acute renal failure admitted to SICU for urgent dialysis.    Recommendations:   -Obtain BILATERAL vein mapping of both upper extremities  -Please place pink band on LUE and do not use this arm for IV access/needle sticks as patient may need fistula placement in the future  -Vascular will continue to follow, call if pt needs permanent access int he future  -Appreciate renal recs in the context of AMS on exam today.

## 2020-07-26 NOTE — PROGRESS NOTE ADULT - PROBLEM SELECTOR PLAN 9
0.8cm solid nodule in lateral R breast noted on CT.   - Correlate w/ outpatient mammogram F: Replete as needed  E: Per nephrology recs  N: Renal diet  D: Heparin 5000 q8hr

## 2020-07-26 NOTE — PROGRESS NOTE ADULT - SUBJECTIVE AND OBJECTIVE BOX
INCOMPLETE NOTE  OVERNIGHT EVENTS:    SUBJECTIVE / INTERVAL HPI: Patient seen and examined at bedside.     VITAL SIGNS:  Vital Signs Last 24 Hrs  T(C): 36.6 (26 Jul 2020 05:14), Max: 37.3 (25 Jul 2020 14:01)  T(F): 97.9 (26 Jul 2020 05:14), Max: 99.2 (25 Jul 2020 14:01)  HR: 92 (26 Jul 2020 06:20) (48 - 108)  BP: 114/68 (26 Jul 2020 06:20) (60/35 - 152/84)  BP(mean): 80 (26 Jul 2020 06:20) (43 - 80)  RR: 18 (26 Jul 2020 06:20) (16 - 22)  SpO2: 99% (26 Jul 2020 06:20) (95% - 100%)    PHYSICAL EXAM:    General: Well developed, well nourished, no acute distress  HEENT: NC/AT; PERRL, anicteric sclera; MMM  Neck: supple  Cardiovascular: +S1/S2, RRR, no murmurs, rubs, gallops  Respiratory: CTA B/L; no W/R/R  Gastrointestinal: soft, NT/ND; +BSx4  Extremities: WWP; no edema, clubbing or cyanosis  Vascular: 2+ radial, DP/PT pulses B/L  Neurological: AAOx3; no focal deficits    MEDICATIONS:  MEDICATIONS  (STANDING):  cefTRIAXone   IVPB 1000 milliGRAM(s) IV Intermittent every 24 hours  heparin   Injectable 5000 Unit(s) SubCutaneous every 8 hours  risperiDONE   Tablet 0.5 milliGRAM(s) Oral two times a day  sodium chloride 0.9% Bolus 500 milliLiter(s) IV Bolus once    MEDICATIONS  (PRN):      ALLERGIES:  Allergies    IV Contrast (Anaphylaxis)  Levaquin (Angioedema)  penicillin (Angioedema)    Intolerances        LABS:                        13.2   26.51 )-----------( 245      ( 25 Jul 2020 14:09 )             41.1     07-26    138  |  98  |  78<H>  ----------------------------<  87  5.2   |  19<L>  |  6.18<H>    Ca    7.8<L>      26 Jul 2020 06:10  Phos  7.4     07-26  Mg     2.3     07-26    TPro  5.4<L>  /  Alb  3.2<L>  /  TBili  0.4  /  DBili  x   /  AST  29  /  ALT  10  /  AlkPhos  56  07-26    PT/INR - ( 25 Jul 2020 14:09 )   PT: 13.7 sec;   INR: 1.15          PTT - ( 25 Jul 2020 14:09 )  PTT:34.4 sec  Urinalysis Basic - ( 25 Jul 2020 14:52 )    Color: Yellow / Appearance: Clear / SG: >=1.030 / pH: x  Gluc: x / Ketone: NEGATIVE  / Bili: Moderate / Urobili: 1.0 E.U./dL   Blood: x / Protein: >=300 mg/dL / Nitrite: POSITIVE   Leuk Esterase: Moderate / RBC: < 5 /HPF / WBC Many /HPF   Sq Epi: x / Non Sq Epi: 0-5 /HPF / Bacteria: Many /HPF      CAPILLARY BLOOD GLUCOSE      POCT Blood Glucose.: 128 mg/dL (25 Jul 2020 16:38)      RADIOLOGY & ADDITIONAL TESTS: Reviewed.    PLAN: TRANSFER NOTE  [facility] to [facility]    Hospital Course:  Ms. Salazar is a 75 y/o female with PMHx of hemorrhagic CVA 9/2013 with residual L sided weakness, HTN, CKD, who was BIBEMS for AMS. Admitted for altered mental status w/ concomitant severe renal injury associated with hyperkalemia and uremic encephalopathy. Nephrology consulted for urgent ED and vascular surgery consulted for future fistula placement. She was admitted to medicine telemetry for urgent HD. Catheter was placed - tolerated dialysis well overnight. Patient was started on Ceftriaxone for UTI. Potassium level wnl this morning. Patient no longer requires telemetry and  will be transferred to [facility] for further management.       Subjective/ROS: ROS could not be obtained due to patient's mental status.      Family History, Social History, Past Medical History, and Home Medications from admission H&P were reviewed and are unchanged unless noted below.     VITALS  Vital Signs Last 24 Hrs  T(C): 36.9 (26 Jul 2020 10:07), Max: 37.3 (25 Jul 2020 14:01)  T(F): 98.4 (26 Jul 2020 10:07), Max: 99.2 (25 Jul 2020 14:01)  HR: 92 (26 Jul 2020 09:00) (48 - 108)  BP: 84/45 (26 Jul 2020 09:00) (60/35 - 152/84)  BP(mean): 61 (26 Jul 2020 09:00) (43 - 80)  RR: 16 (26 Jul 2020 09:00) (16 - 22)  SpO2: 97% (26 Jul 2020 09:00) (95% - 100%)    CAPILLARY BLOOD GLUCOSE      POCT Blood Glucose.: 128 mg/dL (25 Jul 2020 16:38)      PHYSICAL EXAM:  General: Awake, confused, NAD   Neck: Supple; large right supraclavicular Lipoma present, non tender   Respiratory: CTA B/L; no wheezes/crackles   Cardiovascular: Regular rhythm/rate; S1/S2   Gastrointestinal: Soft; NTND; normoactive BS  : Sorenson in place - not draining urine   Extremities: WWP; bilateral lymphedema, venous statis dermatitis. R groin HD catheter in-place  Neurologic: Alert, confused    MEDICATIONS  (STANDING):  cefTRIAXone   IVPB 1000 milliGRAM(s) IV Intermittent every 24 hours  heparin   Injectable 5000 Unit(s) SubCutaneous every 8 hours  risperiDONE   Tablet 0.5 milliGRAM(s) Oral two times a day    MEDICATIONS  (PRN):      IV Contrast (Anaphylaxis)  Levaquin (Angioedema)  penicillin (Angioedema)      LABS                        9.9    14.22 )-----------( 143      ( 26 Jul 2020 07:21 )             31.2     07-26    138  |  98  |  78<H>  ----------------------------<  87  5.2   |  19<L>  |  6.18<H>    Ca    7.8<L>      26 Jul 2020 06:10  Phos  7.4     07-26  Mg     2.3     07-26    TPro  5.4<L>  /  Alb  3.2<L>  /  TBili  0.4  /  DBili  x   /  AST  29  /  ALT  10  /  AlkPhos  56  07-26    PT/INR - ( 25 Jul 2020 14:09 )   PT: 13.7 sec;   INR: 1.15          PTT - ( 25 Jul 2020 14:09 )  PTT:34.4 sec  Urinalysis Basic - ( 25 Jul 2020 14:52 )    Color: Yellow / Appearance: Clear / SG: >=1.030 / pH: x  Gluc: x / Ketone: NEGATIVE  / Bili: Moderate / Urobili: 1.0 E.U./dL   Blood: x / Protein: >=300 mg/dL / Nitrite: POSITIVE   Leuk Esterase: Moderate / RBC: < 5 /HPF / WBC Many /HPF   Sq Epi: x / Non Sq Epi: 0-5 /HPF / Bacteria: Many /HPF      CARDIAC MARKERS ( 26 Jul 2020 06:10 )  x     / x     / 1070 U/L / x     / x      CARDIAC MARKERS ( 26 Jul 2020 00:32 )  x     / 0.09 ng/mL / 1344 U/L / x     / 12.7 ng/mL  CARDIAC MARKERS ( 25 Jul 2020 14:09 )  x     / 0.12 ng/mL / x     / x     / x            IMAGING/EKG/ETC  EKG:  Xray:  CT:  MRI: INCOMPLETE NOTE  TRANSFER NOTE  [facility] to [facility]    Hospital Course:  Ms. Salazar is a 73 y/o female with PMHx of hemorrhagic CVA 9/2013 with residual L sided weakness, HTN, CKD, who was BIBEMS for AMS. Admitted for altered mental status w/ concomitant severe renal injury associated with hyperkalemia and uremic encephalopathy. Nephrology consulted for urgent ED and vascular surgery consulted for future fistula placement. She was admitted to medicine telemetry for urgent HD. Catheter was placed - tolerated dialysis well overnight. Patient was started on Ceftriaxone for UTI. Potassium level wnl this morning. Patient is HD stable and no longer requires telemetry, will be transferred to [facility] for further management.     Subjective/ROS: Patient was seen and examined by bedside. Is lying down comfortably, although still confused/altered.     Family History, Social History, Past Medical History, and Home Medications from admission H&P were reviewed and are unchanged unless noted below.     VITALS  Vital Signs Last 24 Hrs  T(C): 36.9 (26 Jul 2020 10:07), Max: 37.3 (25 Jul 2020 14:01)  T(F): 98.4 (26 Jul 2020 10:07), Max: 99.2 (25 Jul 2020 14:01)  HR: 92 (26 Jul 2020 09:00) (48 - 108)  BP: 84/45 (26 Jul 2020 09:00) (60/35 - 152/84)  BP(mean): 61 (26 Jul 2020 09:00) (43 - 80)  RR: 16 (26 Jul 2020 09:00) (16 - 22)  SpO2: 97% (26 Jul 2020 09:00) (95% - 100%)    CAPILLARY BLOOD GLUCOSE      POCT Blood Glucose.: 128 mg/dL (25 Jul 2020 16:38)      PHYSICAL EXAM:  General: Awake, confused, NAD   Neck: Supple; large right supraclavicular Lipoma present, non tender   Respiratory: CTA B/L; no wheezes/crackles   Cardiovascular: Regular rhythm/rate; S1/S2   Gastrointestinal: Soft; NTND; normoactive BS  : Sorenson in place - not draining urine   Extremities: WWP; bilateral lymphedema, venous statis dermatitis. R groin HD catheter in-place  Neurologic: Alert, confused, not cooperative with exam    MEDICATIONS  (STANDING):  cefTRIAXone   IVPB 1000 milliGRAM(s) IV Intermittent every 24 hours  heparin   Injectable 5000 Unit(s) SubCutaneous every 8 hours  risperiDONE   Tablet 0.5 milliGRAM(s) Oral two times a day    MEDICATIONS  (PRN):      IV Contrast (Anaphylaxis)  Levaquin (Angioedema)  penicillin (Angioedema)      LABS                        9.9    14.22 )-----------( 143      ( 26 Jul 2020 07:21 )             31.2     07-26    138  |  98  |  78<H>  ----------------------------<  87  5.2   |  19<L>  |  6.18<H>    Ca    7.8<L>      26 Jul 2020 06:10  Phos  7.4     07-26  Mg     2.3     07-26    TPro  5.4<L>  /  Alb  3.2<L>  /  TBili  0.4  /  DBili  x   /  AST  29  /  ALT  10  /  AlkPhos  56  07-26    PT/INR - ( 25 Jul 2020 14:09 )   PT: 13.7 sec;   INR: 1.15          PTT - ( 25 Jul 2020 14:09 )  PTT:34.4 sec  Urinalysis Basic - ( 25 Jul 2020 14:52 )    Color: Yellow / Appearance: Clear / SG: >=1.030 / pH: x  Gluc: x / Ketone: NEGATIVE  / Bili: Moderate / Urobili: 1.0 E.U./dL   Blood: x / Protein: >=300 mg/dL / Nitrite: POSITIVE   Leuk Esterase: Moderate / RBC: < 5 /HPF / WBC Many /HPF   Sq Epi: x / Non Sq Epi: 0-5 /HPF / Bacteria: Many /HPF      CARDIAC MARKERS ( 26 Jul 2020 06:10 )  x     / x     / 1070 U/L / x     / x      CARDIAC MARKERS ( 26 Jul 2020 00:32 )  x     / 0.09 ng/mL / 1344 U/L / x     / 12.7 ng/mL  CARDIAC MARKERS ( 25 Jul 2020 14:09 )  x     / 0.12 ng/mL / x     / x     / x        CT Abdomen and Pelvis No Cont (07.25.20)  Impression:  1.  No nephrolithiasis or obstructive uropathy.  2.  Gallbladder distention and cholelithiasis with mild common bile ductal dilatation. No evidence for choledocholithiasis or intrahepatic duct dilatation. MRCP or HIDA scan might be considered for further assessment if clinically warranted.  3.  Numerous prominent bilateral pelvic and retroperitoneal nodes, possibly lymphoma versus reactive/inflammatory. Correlate with clinical history.  4.  0.8 cm solid nodule in lateral right breast. Recommend correlation with mammography.  5.  Few solid pulmonary micronodules in the left lower lobe, unclear chronicity, possibly infectious or inflammatory etiology. In a low risk patient no additional follow-up recommended. In a high-risk patient consider optional follow-up with noncontrast CT chest in 12 months per Fleischner Society guidelines.  6.  Moderate retained rectal stool, possibly constipation.      CT Head No Cont (07.25.20)    IMPRESSION:    Motion limited exam shows no definite acute intracranial hemorrhage, mass effect or CT evidence of recent transcortical infarction.    Diffuse parenchymal volume loss which has progressed from 2013. OVERNIGHT EVENTS: HD Catheter was placed for urgent hemodialysis. Patient had dialysis overnight - BP was shortly dropped during HD which improved with 500 cc bolus NS.     SUBJECTIVE / INTERVAL HPI: Patient seen and examined at bedside this morning. Patient awake although unable to answer questions.     Pt witnessed with RUE twitching/jerking motion in the afternoon - improved spontaneously within a few minutes.     VITAL SIGNS:  Vital Signs Last 24 Hrs  T(C): 36.9 (26 Jul 2020 13:13), Max: 36.9 (26 Jul 2020 10:07)  T(F): 98.5 (26 Jul 2020 13:13), Max: 98.5 (26 Jul 2020 13:13)  HR: 92 (26 Jul 2020 16:40) (86 - 108)  BP: 93/51 (26 Jul 2020 16:40) (60/35 - 152/84)  BP(mean): 69 (26 Jul 2020 16:40) (43 - 80)  RR: 16 (26 Jul 2020 16:40) (16 - 22)  SpO2: 100% (26 Jul 2020 16:40) (95% - 100%)    PHYSICAL EXAM:  Constitutional: Appears somnolent, no acute distress  Neck: Supple; large right supraclavicular soft mass present, non tender   Respiratory: CTA B/L; no wheezes/crackles   Cardiovascular: Regular rhythm/rate; S1/S2   Gastrointestinal: Soft; NTND; normoactive BS  : Sorenson in place - not draining urine   Extremities: RUE twitching/tremor; bilateral lymphedema, venous statis dermatitis. R groin HD catheter in-place  Neurologic: Alert, confused, not cooperative with exam      MEDICATIONS:  MEDICATIONS  (STANDING):  cefTRIAXone   IVPB 1000 milliGRAM(s) IV Intermittent every 24 hours  heparin   Injectable 5000 Unit(s) SubCutaneous every 8 hours  risperiDONE   Tablet 0.5 milliGRAM(s) Oral two times a day    MEDICATIONS  (PRN):  albumin human 25% IVPB 50 milliLiter(s) IV Intermittent every 1 hour PRN to maintain sbp> 100 mmhg intradialysis      ALLERGIES:  Allergies    IV Contrast (Anaphylaxis)  Levaquin (Angioedema)  penicillin (Angioedema)    Intolerances      LABS:                        9.9    14.22 )-----------( 143      ( 26 Jul 2020 07:21 )             31.2     07-26    138  |  98  |  78<H>  ----------------------------<  87  5.2   |  19<L>  |  6.18<H>    Ca    7.8<L>      26 Jul 2020 06:10  Phos  7.4     07-26  Mg     2.3     07-26    TPro  5.4<L>  /  Alb  3.2<L>  /  TBili  0.4  /  DBili  x   /  AST  29  /  ALT  10  /  AlkPhos  56  07-26    PT/INR - ( 25 Jul 2020 14:09 )   PT: 13.7 sec;   INR: 1.15          PTT - ( 25 Jul 2020 14:09 )  PTT:34.4 sec  Urinalysis Basic - ( 25 Jul 2020 14:52 )    Color: Yellow / Appearance: Clear / SG: >=1.030 / pH: x  Gluc: x / Ketone: NEGATIVE  / Bili: Moderate / Urobili: 1.0 E.U./dL   Blood: x / Protein: >=300 mg/dL / Nitrite: POSITIVE   Leuk Esterase: Moderate / RBC: < 5 /HPF / WBC Many /HPF   Sq Epi: x / Non Sq Epi: 0-5 /HPF / Bacteria: Many /HPF      CAPILLARY BLOOD GLUCOSE      POCT Blood Glucose.: 128 mg/dL (25 Jul 2020 16:38)      RADIOLOGY & ADDITIONAL TESTS: Reviewed.

## 2020-07-26 NOTE — PROGRESS NOTE ADULT - PROBLEM SELECTOR PLAN 4
- ECG with peaked t waves.   - elevated K 7.8  - continue to monitor ECG -Nitrite +, LEC +, Bacteria +  -Continue 1 gr ceftriaxone   -f/u urine culture -K 7.8 in ED, wnl after HD  -Continue to monitor

## 2020-07-26 NOTE — PROGRESS NOTE ADULT - SUBJECTIVE AND OBJECTIVE BOX
O/N Events:  ROGERIO, remained HDS, bp soft, lactate WNL, tolerated HD well  Subjective:  NAD, resting comfortably in bed, confused but overall improved from yesterday, BUN down to 80, K 5.2  remained anuric     VITALS  Vital Signs Last 24 Hrs  T(C): 36.9 (26 Jul 2020 10:07), Max: 37.3 (25 Jul 2020 14:01)  T(F): 98.4 (26 Jul 2020 10:07), Max: 99.2 (25 Jul 2020 14:01)  HR: 92 (26 Jul 2020 09:00) (48 - 108)  BP: 84/45 (26 Jul 2020 09:00) (60/35 - 152/84)  BP(mean): 61 (26 Jul 2020 09:00) (43 - 80)  RR: 16 (26 Jul 2020 09:00) (16 - 22)  SpO2: 97% (26 Jul 2020 09:00) (95% - 100%)    PHYSICAL EXAM  Constitutional: NAD on RA   Neck: supple; large right supraclavicular soft mass present  Respiratory: CTA B/L  Cardiac: RRR, s1s2  Gastrointestinal: soft, obese, ND NT  Extremities: WWP, severe lymphedema with cellulitic changes present b/l   Neurologic: alert and awake but confused, appears nonfocal   Access: left groin HD cath c/d/i  Sorenson: with no output     MEDICATIONS  (STANDING):  cefTRIAXone   IVPB 1000 milliGRAM(s) IV Intermittent every 24 hours  heparin   Injectable 5000 Unit(s) SubCutaneous every 8 hours  risperiDONE   Tablet 0.5 milliGRAM(s) Oral two times a day  sodium chloride 0.9% Bolus 250 milliLiter(s) IV Bolus once    MEDICATIONS  (PRN):      LABS                        9.9    14.22 )-----------( 143      ( 26 Jul 2020 07:21 )             31.2     07-26    138  |  98  |  78<H>  ----------------------------<  87  5.2   |  19<L>  |  6.18<H>    Ca    7.8<L>      26 Jul 2020 06:10  Phos  7.4     07-26  Mg     2.3     07-26    TPro  5.4<L>  /  Alb  3.2<L>  /  TBili  0.4  /  DBili  x   /  AST  29  /  ALT  10  /  AlkPhos  56  07-26    LIVER FUNCTIONS - ( 26 Jul 2020 06:10 )  Alb: 3.2 g/dL / Pro: 5.4 g/dL / ALK PHOS: 56 U/L / ALT: 10 U/L / AST: 29 U/L / GGT: x           PT/INR - ( 25 Jul 2020 14:09 )   PT: 13.7 sec;   INR: 1.15          PTT - ( 25 Jul 2020 14:09 )  PTT:34.4 sec  Urinalysis Basic - ( 25 Jul 2020 14:52 )    Color: Yellow / Appearance: Clear / SG: >=1.030 / pH: x  Gluc: x / Ketone: NEGATIVE  / Bili: Moderate / Urobili: 1.0 E.U./dL   Blood: x / Protein: >=300 mg/dL / Nitrite: POSITIVE   Leuk Esterase: Moderate / RBC: < 5 /HPF / WBC Many /HPF   Sq Epi: x / Non Sq Epi: 0-5 /HPF / Bacteria: Many /HPF      CARDIAC MARKERS ( 26 Jul 2020 06:10 )  x     / x     / 1070 U/L / x     / x      CARDIAC MARKERS ( 26 Jul 2020 00:32 )  x     / 0.09 ng/mL / 1344 U/L / x     / 12.7 ng/mL  CARDIAC MARKERS ( 25 Jul 2020 14:09 )  x     / 0.12 ng/mL / x     / x     / x O/N Events:  ROGERIO, remained HDS, bp soft, lactate WNL, tolerated HD well  Subjective:  NAD, resting comfortably in bed, confused but overall improved from yesterday, BUN down to 80, K 5.2  remained anuric     VITALS  Vital Signs Last 24 Hrs  T(C): 36.9 (26 Jul 2020 10:07), Max: 37.3 (25 Jul 2020 14:01)  T(F): 98.4 (26 Jul 2020 10:07), Max: 99.2 (25 Jul 2020 14:01)  HR: 92 (26 Jul 2020 09:00) (48 - 108)  BP: 84/45 (26 Jul 2020 09:00) (60/35 - 152/84)  BP(mean): 61 (26 Jul 2020 09:00) (43 - 80)  RR: 16 (26 Jul 2020 09:00) (16 - 22)  SpO2: 97% (26 Jul 2020 09:00) (95% - 100%)    PHYSICAL EXAM  Constitutional: NAD on RA   Neck: supple; large right supraclavicular soft mass present  Respiratory: CTA B/L  Cardiac: RRR, s1s2  Gastrointestinal: soft, obese, ND NT  Extremities: WWP, severe lymphedema with cellulitic changes present b/l   jerking/ twitching UE movement   Neurologic: alert and awake but confused, appears nonfocal   Access: left groin HD cath c/d/i  Sorneson: with no output     MEDICATIONS  (STANDING):  cefTRIAXone   IVPB 1000 milliGRAM(s) IV Intermittent every 24 hours  heparin   Injectable 5000 Unit(s) SubCutaneous every 8 hours  risperiDONE   Tablet 0.5 milliGRAM(s) Oral two times a day  sodium chloride 0.9% Bolus 250 milliLiter(s) IV Bolus once    MEDICATIONS  (PRN):      LABS                        9.9    14.22 )-----------( 143      ( 26 Jul 2020 07:21 )             31.2     07-26    138  |  98  |  78<H>  ----------------------------<  87  5.2   |  19<L>  |  6.18<H>    Ca    7.8<L>      26 Jul 2020 06:10  Phos  7.4     07-26  Mg     2.3     07-26    TPro  5.4<L>  /  Alb  3.2<L>  /  TBili  0.4  /  DBili  x   /  AST  29  /  ALT  10  /  AlkPhos  56  07-26    LIVER FUNCTIONS - ( 26 Jul 2020 06:10 )  Alb: 3.2 g/dL / Pro: 5.4 g/dL / ALK PHOS: 56 U/L / ALT: 10 U/L / AST: 29 U/L / GGT: x           PT/INR - ( 25 Jul 2020 14:09 )   PT: 13.7 sec;   INR: 1.15          PTT - ( 25 Jul 2020 14:09 )  PTT:34.4 sec  Urinalysis Basic - ( 25 Jul 2020 14:52 )    Color: Yellow / Appearance: Clear / SG: >=1.030 / pH: x  Gluc: x / Ketone: NEGATIVE  / Bili: Moderate / Urobili: 1.0 E.U./dL   Blood: x / Protein: >=300 mg/dL / Nitrite: POSITIVE   Leuk Esterase: Moderate / RBC: < 5 /HPF / WBC Many /HPF   Sq Epi: x / Non Sq Epi: 0-5 /HPF / Bacteria: Many /HPF      CARDIAC MARKERS ( 26 Jul 2020 06:10 )  x     / x     / 1070 U/L / x     / x      CARDIAC MARKERS ( 26 Jul 2020 00:32 )  x     / 0.09 ng/mL / 1344 U/L / x     / 12.7 ng/mL  CARDIAC MARKERS ( 25 Jul 2020 14:09 )  x     / 0.12 ng/mL / x     / x     / x

## 2020-07-26 NOTE — PROGRESS NOTE ADULT - PROBLEM SELECTOR PLAN 7
PMX of Hemorrhagic CVA 2013  Left side weakness  f/u neuro exam Pt withdrawal in pain in LE  -Continue to monitor for cellulitis

## 2020-07-26 NOTE — PROGRESS NOTE ADULT - ATTENDING COMMENTS
New/worsening RUE jerking motion, not tracking, d/w team who are contacting neuro team to evaluate for seizure, we will dialyze again in case of uremia contributing to MS, and/or neurotoxicity from an unknown renally cleared medicine contributing to her tremors.  Kidneys atrophic on imaging suggesting long standing advanced renal disease.

## 2020-07-26 NOTE — PROGRESS NOTE ADULT - PROBLEM SELECTOR PLAN 6
- Hx of HF   - continue to monitr  -consider HTN meds once HTN approved PMX of Hemorrhagic CVA 2013  Left side weakness F: Replete as needed  E: Per nephrology recs  N: Renal diet  D: Heparin 5000 q8hr

## 2020-07-26 NOTE — PROVIDER CONTACT NOTE (CHANGE IN STATUS NOTIFICATION) - BACKGROUND
admitted with AMS, admitted for emergent dialysis.  found down in apt, alert, responds "Yes" and "No" to some questions, minimally verbal. was up for step down to regional medical unit.

## 2020-07-26 NOTE — PROGRESS NOTE ADULT - PROBLEM SELECTOR PLAN 3
K 7.8 in ED  Decreased to 6.6 after treatment  HD done by nephrology 7/25 night  F/u BMP   f/u nephrology recs -K 7.8 in ED, wnl after HD  -F/u BMP   -f/u nephrology recs Refer to plan above.

## 2020-07-26 NOTE — PROGRESS NOTE ADULT - PROBLEM SELECTOR PLAN 2
History of prior hyperkalemia episode treated medically at [facility], discharged to [facility] for LEYDA  - Nephrology consulted for urgent HD and femoral HD catheter placed by pulm fellow  - Vascular consulted for potential fistula placement this admission  - Minimal UOP since admission; however, patient severely dehydrated  - Monitor I's / O's  #Uremia    - No reports of pruritis or poor PO intake; however, patient significantly altered  - BUN in  (as above)  - Monitor BMP  - Monitor for sign of bleeding Metabolic encephalopathy due to ARF/ uremia. .  -f/u nephrology recs YOLIE jerking motion witnessed this afternoon - spontaneously resolved. Unclear if it's new or worsening of tremors.   -VEEG in place, follow up report  -Nephro will dialyze again today incase pt's uremia is causing neurotoxicity

## 2020-07-26 NOTE — PROGRESS NOTE ADULT - PROBLEM SELECTOR PROBLEM 6
(HFpEF) heart failure with preserved ejection fraction CVA (cerebral vascular accident) Nutrition, metabolism, and development symptoms

## 2020-07-26 NOTE — PROGRESS NOTE ADULT - ASSESSMENT
A/p  75 y/o F admitted for altered mental status w/concomitant severe renal injury associated with hyperkalemia and uremic encephalopathy and UTI

## 2020-07-26 NOTE — PROGRESS NOTE ADULT - PROBLEM SELECTOR PLAN 5
Nitrite +, LEC +, Bacteria +  she received 1 gr ceftriaxone in ED  Continue 1 gr ceftriaxone   f/u urine culture -Hx of HF   -continue to monitor  -consider HTN meds once HTN approved -Nitrite +, LEC +, Bacteria +  -Continue 1 gr ceftriaxone   -f/u urine culture

## 2020-07-26 NOTE — PROGRESS NOTE ADULT - PROBLEM SELECTOR PLAN 1
anuric KATHARINA on advanced CKD  limited information re baseline kidney fx however CT findings suggestive of advanced medical renal disease given b/l atrophic kidneys also multiple abd/pel LNs concerning for lymphoma, TLS labs not fully supportive, UA elevated which would be expected i.s.o acute kidney injury rather than spontaneous tumor lysis and tissue breakdown, corrected Ca in normal range   hard to predict Kidney function recovery at this point given advanced kidney disease to start with most likely would be dialysis dependent   - can fluid challenge with small NS bolus 250 cc as patient appears intravascularly depleted and hypotensive and monitor UOP  - c/w strict IOs   - HD tomorrow, if no evidence of recovery would need a PermCath placed as currently has groin HD cath  - please obtain iron panel, PTH, 25 and 1-25 Vit D level  - start phoslo 667 mg TID w/meals   Will follow anuric KATHARINA on advanced CKD  limited information re baseline kidney fx however CT findings suggestive of advanced medical renal disease given b/l atrophic kidneys also multiple abd/pel LNs concerning for lymphoma, TLS labs not fully supportive, UA elevated which would be expected i.s.o acute kidney injury rather than spontaneous tumor lysis and tissue breakdown, corrected Ca in normal range   hard to predict Kidney function recovery at this point given advanced kidney disease to start with most likely would be dialysis dependent   - can fluid challenge with small NS bolus 250 cc as patient appears intravascularly depleted and hypotensive and monitor UOP  - start lokelma 10 g stat and q 8hours for 3 doses   - c/w strict IOs   - HD tomorrow, if no evidence of recovery would need a PermCath placed as currently has groin HD cath  - please obtain iron panel, PTH, 25 and 1-25 Vit D level  - start phoslo 667 mg TID w/meals   Will follow anuric KATHARINA on advanced CKD  limited information re baseline kidney fx however CT findings suggestive of advanced medical renal disease given b/l atrophic kidneys also multiple abd/pel LNs concerning for lymphoma, TLS labs not fully supportive, UA elevated which would be expected i.s.o acute kidney injury rather than spontaneous tumor lysis and tissue breakdown, corrected Ca in normal range   hard to predict Kidney function recovery at this point given advanced kidney disease to start with most likely would be dialysis dependent   - will proceed with HD today for clearance and further optimization of MS with concerns for neurotoxicity and severe renal injury   - can fluid challenge with small NS bolus 250 cc as patient appears intravascularly depleted and hypotensive and monitor UOP  - c/w strict IOs   - please obtain iron panel, PTH, 25 and 1-25 Vit D level  - start phoslo 667 mg TID w/meals   Will follow anuric KATHARINA on advanced CKD  limited information re baseline kidney fx however CT findings suggestive of advanced medical renal disease given b/l atrophic kidneys also multiple abd/pel LNs concerning for lymphoma, TLS labs not fully supportive, UA elevated which would be expected i.s.o acute kidney injury rather than spontaneous tumor lysis and tissue breakdown, corrected Ca in normal range   hard to predict Kidney function recovery at this point given advanced kidney disease to start with most likely would be dialysis dependent   - will proceed with HD today for clearance and further optimization of MS with concerns for possible renally cleared neurotoxic agents   - can fluid challenge with a small NS bolus 250 cc as patient appears intravascularly depleted and hypotensive, monitor UOP  - c/w strict IOs   - please obtain iron panel, PTH, 25 and 1-25 Vit D level  - start phoslo 667 mg TID w/meals   Will follow

## 2020-07-26 NOTE — PROGRESS NOTE ADULT - PROBLEM SELECTOR PLAN 1
AAO x 1 in the ED  Metabolic encephalopathy due to ARF/ uremia  Lactate 1.8  AG 28    HD done by nephrology 7/25 evening  f/u nephrology recs History of prior hyperkalemia episode treated medically at [facility], discharged to [facility] for LEYDA.  -Nephro following, plan for HD tomorrow (7/27). Vascular following for potential fistula placement if there are no signs of recovery after tomorrow's HD.   -Minimal UOP since admission, Monitor I/O  -Refer to Nephro's note for further recs -Nephro following, s/p HD x1. plan for HD again today (7/26). Vascular following for potential fistula placement if there are no signs of recovery after HD.   -Minimal urine output since admission, Continue monitoring I/O  -Refer to Nephro's note for further recs

## 2020-07-27 DIAGNOSIS — L03.90 CELLULITIS, UNSPECIFIED: ICD-10-CM

## 2020-07-27 DIAGNOSIS — R59.0 LOCALIZED ENLARGED LYMPH NODES: ICD-10-CM

## 2020-07-27 LAB
-  AMPICILLIN/SULBACTAM: SIGNIFICANT CHANGE UP
-  AMPICILLIN/SULBACTAM: SIGNIFICANT CHANGE UP
-  AMPICILLIN: SIGNIFICANT CHANGE UP
-  AMPICILLIN: SIGNIFICANT CHANGE UP
-  CEFAZOLIN: SIGNIFICANT CHANGE UP
-  CEFAZOLIN: SIGNIFICANT CHANGE UP
-  CEFTRIAXONE: SIGNIFICANT CHANGE UP
-  CEFTRIAXONE: SIGNIFICANT CHANGE UP
-  CIPROFLOXACIN: SIGNIFICANT CHANGE UP
-  CIPROFLOXACIN: SIGNIFICANT CHANGE UP
-  COAGULASE NEGATIVE STAPHYLOCOCCUS: SIGNIFICANT CHANGE UP
-  GENTAMICIN: SIGNIFICANT CHANGE UP
-  GENTAMICIN: SIGNIFICANT CHANGE UP
-  NITROFURANTOIN: SIGNIFICANT CHANGE UP
-  NITROFURANTOIN: SIGNIFICANT CHANGE UP
-  PIPERACILLIN/TAZOBACTAM: SIGNIFICANT CHANGE UP
-  PIPERACILLIN/TAZOBACTAM: SIGNIFICANT CHANGE UP
-  TOBRAMYCIN: SIGNIFICANT CHANGE UP
-  TOBRAMYCIN: SIGNIFICANT CHANGE UP
-  TRIMETHOPRIM/SULFAMETHOXAZOLE: SIGNIFICANT CHANGE UP
-  TRIMETHOPRIM/SULFAMETHOXAZOLE: SIGNIFICANT CHANGE UP
ANION GAP SERPL CALC-SCNC: 18 MMOL/L — HIGH (ref 5–17)
BLD GP AB SCN SERPL QL: NEGATIVE — SIGNIFICANT CHANGE UP
BUN SERPL-MCNC: 59 MG/DL — HIGH (ref 7–23)
CALCIUM SERPL-MCNC: 8.1 MG/DL — LOW (ref 8.4–10.5)
CHLORIDE SERPL-SCNC: 98 MMOL/L — SIGNIFICANT CHANGE UP (ref 96–108)
CO2 SERPL-SCNC: 19 MMOL/L — LOW (ref 22–31)
CREAT SERPL-MCNC: 5.11 MG/DL — HIGH (ref 0.5–1.3)
CULTURE RESULTS: SIGNIFICANT CHANGE UP
GLUCOSE SERPL-MCNC: 93 MG/DL — SIGNIFICANT CHANGE UP (ref 70–99)
HCT VFR BLD CALC: 31.1 % — LOW (ref 34.5–45)
HGB BLD-MCNC: 9.4 G/DL — LOW (ref 11.5–15.5)
MAGNESIUM SERPL-MCNC: 2.3 MG/DL — SIGNIFICANT CHANGE UP (ref 1.6–2.6)
MCHC RBC-ENTMCNC: 30.2 GM/DL — LOW (ref 32–36)
MCHC RBC-ENTMCNC: 30.2 PG — SIGNIFICANT CHANGE UP (ref 27–34)
MCV RBC AUTO: 100 FL — SIGNIFICANT CHANGE UP (ref 80–100)
METHOD TYPE: SIGNIFICANT CHANGE UP
NRBC # BLD: 0 /100 WBCS — SIGNIFICANT CHANGE UP (ref 0–0)
ORGANISM # SPEC MICROSCOPIC CNT: SIGNIFICANT CHANGE UP
PHOSPHATE SERPL-MCNC: 6.1 MG/DL — HIGH (ref 2.5–4.5)
PLATELET # BLD AUTO: 122 K/UL — LOW (ref 150–400)
POTASSIUM SERPL-MCNC: 4.8 MMOL/L — SIGNIFICANT CHANGE UP (ref 3.5–5.3)
POTASSIUM SERPL-SCNC: 4.8 MMOL/L — SIGNIFICANT CHANGE UP (ref 3.5–5.3)
RBC # BLD: 3.11 M/UL — LOW (ref 3.8–5.2)
RBC # FLD: 13 % — SIGNIFICANT CHANGE UP (ref 10.3–14.5)
RH IG SCN BLD-IMP: POSITIVE — SIGNIFICANT CHANGE UP
SODIUM SERPL-SCNC: 135 MMOL/L — SIGNIFICANT CHANGE UP (ref 135–145)
SPECIMEN SOURCE: SIGNIFICANT CHANGE UP
URATE SERPL-MCNC: 6.5 MG/DL — SIGNIFICANT CHANGE UP (ref 2.5–7)
WBC # BLD: 11.7 K/UL — HIGH (ref 3.8–10.5)
WBC # FLD AUTO: 11.7 K/UL — HIGH (ref 3.8–10.5)

## 2020-07-27 PROCEDURE — 99223 1ST HOSP IP/OBS HIGH 75: CPT | Mod: GC

## 2020-07-27 PROCEDURE — 90935 HEMODIALYSIS ONE EVALUATION: CPT

## 2020-07-27 PROCEDURE — 99221 1ST HOSP IP/OBS SF/LOW 40: CPT

## 2020-07-27 PROCEDURE — 95720 EEG PHY/QHP EA INCR W/VEEG: CPT

## 2020-07-27 PROCEDURE — 99233 SBSQ HOSP IP/OBS HIGH 50: CPT | Mod: GC

## 2020-07-27 PROCEDURE — 93970 EXTREMITY STUDY: CPT | Mod: 26

## 2020-07-27 RX ORDER — VANCOMYCIN HCL 1 G
1250 VIAL (EA) INTRAVENOUS ONCE
Refills: 0 | Status: COMPLETED | OUTPATIENT
Start: 2020-07-27 | End: 2020-07-27

## 2020-07-27 RX ORDER — LEVETIRACETAM 250 MG/1
250 TABLET, FILM COATED ORAL
Refills: 0 | Status: DISCONTINUED | OUTPATIENT
Start: 2020-07-27 | End: 2020-07-27

## 2020-07-27 RX ORDER — QUETIAPINE FUMARATE 200 MG/1
12.5 TABLET, FILM COATED ORAL EVERY 12 HOURS
Refills: 0 | Status: DISCONTINUED | OUTPATIENT
Start: 2020-07-27 | End: 2020-08-04

## 2020-07-27 RX ORDER — LEVETIRACETAM 250 MG/1
500 TABLET, FILM COATED ORAL DAILY
Refills: 0 | Status: DISCONTINUED | OUTPATIENT
Start: 2020-07-27 | End: 2020-07-29

## 2020-07-27 RX ORDER — VANCOMYCIN HCL 1 G
1000 VIAL (EA) INTRAVENOUS ONCE
Refills: 0 | Status: DISCONTINUED | OUTPATIENT
Start: 2020-07-27 | End: 2020-07-27

## 2020-07-27 RX ADMIN — CEFTRIAXONE 100 MILLIGRAM(S): 500 INJECTION, POWDER, FOR SOLUTION INTRAMUSCULAR; INTRAVENOUS at 21:19

## 2020-07-27 RX ADMIN — Medication 166.67 MILLIGRAM(S): at 12:08

## 2020-07-27 RX ADMIN — LEVETIRACETAM 500 MILLIGRAM(S): 250 TABLET, FILM COATED ORAL at 12:08

## 2020-07-27 RX ADMIN — HEPARIN SODIUM 5000 UNIT(S): 5000 INJECTION INTRAVENOUS; SUBCUTANEOUS at 21:19

## 2020-07-27 RX ADMIN — HEPARIN SODIUM 5000 UNIT(S): 5000 INJECTION INTRAVENOUS; SUBCUTANEOUS at 06:09

## 2020-07-27 RX ADMIN — HEPARIN SODIUM 5000 UNIT(S): 5000 INJECTION INTRAVENOUS; SUBCUTANEOUS at 13:56

## 2020-07-27 NOTE — PROGRESS NOTE ADULT - PROBLEM SELECTOR PLAN 2
RUE jerking motion witnessed this afternoon. Unclear if it's new or worsening of tremors.   -vEEG in place, follow up report  - Keppra 500 daily started for epileptiform activity on EEG  -Nephro will dialyze again today incase pt's uremia is causing neurotoxicity

## 2020-07-27 NOTE — BEHAVIORAL HEALTH ASSESSMENT NOTE - THOUGHT CONTENT
Patient presents to [facility] for INR testing. No change in diet or meds. No hospitalizations since last anticoagulation visit.  INR is too low so warfarin dose was adjusted per protocol. Patient is having teeth extracted on Friday, Nov. 10th.   Unremarkable

## 2020-07-27 NOTE — PROGRESS NOTE ADULT - ASSESSMENT
Ms. 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 She was admitted for urgent HD, medicine telemetry. Now s/p HD x2

## 2020-07-27 NOTE — BEHAVIORAL HEALTH ASSESSMENT NOTE - HPI (INCLUDE ILLNESS QUALITY, SEVERITY, DURATION, TIMING, CONTEXT, MODIFYING FACTORS, ASSOCIATED SIGNS AND SYMPTOMS)
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  As per Nursing Report, today's nurse having worked with the pt for the past two days, the patient was acutely delirious on admission, and was unable to answer questions, though has improved significantly over the course of her admission, and has been able to answer some questions today, and appears to have improved cognition. Patient seen by Attending and Medical Student, she is lying in bed, holding a cup of water, dressed in hospital gown, resting comfortably. Pt. reports she was not pleased with her accomodation at her most recent rehab facility and decided to leave, she recalls getting home several days ago and developed trouble walking. She also recalls taking an oxycodone prior to her fall. When asked about family, patient appears uncomfortable and refuses to give collateral information, stating “I do not want to scare him;" patient reports to have lived with a man for 6 years but does not disclose further details. On question about having any children, patient states “I do and I don't," but is unable to elaborate further. Patient recognized the name of the medication risperidone, but is not sure why she is on it, stating that it was recently prescribed.     Pt reports highest level of education being grad school at [employer] in [facility], where she obtained a neurology degree. Pt. is oriented to year but not month; she is not able to identify who the current president is. Pt reports occasional drinking in the past but no illicit drug or tobacco use. Pt denies any past or present AH/VH/SI/HI. Pt. reports to have never been psychiatrically hospitalized, taken psychiatric medication, seen a psychiatrist "other than socially," previous depressive episodes, or attempted to harm herself or others.

## 2020-07-27 NOTE — PROGRESS NOTE ADULT - ATTENDING COMMENTS
Ms. Salazar is a 74 year old woman with a history of hemorrhagic CVA (Sept 2013, residual left sided weakness, and HTN, bilateral OA of the knees,   Per admission H&P from July 25th, patient had recently been treated at [facility] after a fall (dates unclear), received treatment at [facility] for ?renal failure and was discharged to [facility] nursing [place]. She was discharged from [facility] to home several days before this admission. Her doorman called EMS when he had not seen her for 2 days, and she wasn’t picking up food left at her door. Patient was found down in her apartment (unknown how long she had been down for), brought to [facility] by EMS. Received emergent dialysis for K 7.8.  CK was 1300 at time of admission   Connected to vEEG on 7/26 for right arm shaking, concern for seizure.   Also received Abx for Left leg cellulitis, +UA and +UCx.   Will likely need Dialysis going forward    # Found down at home   # Renal failure   Renal function at discharge from [place], and renal function at discharge from [facility] unclear. Per psych note, patient left last nursing home because she was unsatisfied with her accommodation there. Per admission HPI, did not leave with any catheters, or plans for dialysis.  History surrounding her fall is still unclear.   Given seizure like activity here, and epileptiform activity on vEEG, could have had a seizure at home.  Alternatively, may have been weak/altered from acute infection (left leg cellulitis, +UA, +UCx); Acute infection may have lowered seizure threshold  In any case, renal function likely worsened after this episode;   may need dialysis going forward  Work with renal and vascular re: planning for dialysis if renal function does not improve    # Altered Mental Status  At time of interview (late evening), patient was unable to provide much history; When asked her name, she said, “Why yes, my name is …. You are so very cute”. Said “Well, well, well…..” in response to orientation questions re: year, president, season, city”  Possibly sundowning, because patient was able to have a coherent interview with psych earlier in the day.   Would reassess mental status in AM tomorrow.   f/u psych recs    # Seizure like activity   Epileptiform activity on EEG without electrographic seizures  Start on Keppra HD dosing, per Neuro   Please make sure Keppra is ordered to be given post-hemodialysis (HD dosing is 500mg to 1g q24hrs, administered post-hemodialysis on dialysis days; immediate release tablet only, ER tablet not recommended)    # Cellulitis  # UTI  Assess response to current regimen (Ceftriaxone) Complete course of abx;    MISC  Minimize sedating meds Ms. Salazar is a 74 year old woman with a history of hemorrhagic CVA (Sept 2013, residual left sided weakness, and HTN, bilateral OA of the knees,   Per admission H&P from July 25th, patient had recently been treated at [facility] after a fall (dates unclear), received treatment at [facility] for ?renal failure (Her Cr was 1.7 when last discharged from [facility] in 2017) and was discharged to [facility] nursing [place]. She was discharged from [facility] to home several days before this admission. Her doorman called EMS when he had not seen her for 2 days, and she wasn’t picking up food left at her door. Patient was found down in her apartment (unknown how long she had been down for), brought to [facility] by EMS. Received emergent dialysis for K 7.8.  CK was 1300 at time of admission   Connected to vEEG on 7/26 for right arm shaking, concern for seizure.   Also received Abx for Left leg cellulitis, +UA and +UCx.   Will likely need Dialysis going forward    # Found down at home   # Renal failure   Cr 1.7 when d/bradley from [facility] in 2017. Renal function at discharge from [place], and renal function at discharge from [facility] unclear. Per psych note, patient left last nursing home because she was unsatisfied with her accommodation there. Per admission HPI, did not leave with any catheters, or plans for dialysis.  History surrounding her fall is still unclear.   Given seizure like activity here, and epileptiform activity on vEEG, could have had a seizure at home.  Alternatively, may have been weak/altered from acute infection (left leg cellulitis, +UA, +UCx); Acute infection may have lowered seizure threshold  In any case, renal function likely worsened after this episode;   may need dialysis going forward  Work with renal and vascular re: planning for dialysis if renal function does not improve    # Altered Mental Status  At time of interview (late evening), patient was unable to provide much history; When asked her name, she said, “Why yes, my name is …. You are so very cute”. Said “Well, well, well…..” in response to orientation questions re: year, president, season, city”  Possibly sundowning, because patient was able to have a coherent interview with psych earlier in the day.   Would reassess mental status in AM tomorrow.   f/u psych recs    # Seizure like activity   Epileptiform activity on EEG without electrographic seizures  Start on Keppra HD dosing, per Neuro   Please make sure Keppra is ordered to be given post-hemodialysis (HD dosing is 500mg to 1g q24hrs, administered post-hemodialysis on dialysis days; immediate release tablet only, ER tablet not recommended)    # Cellulitis  # UTI  Assess response to current regimen (Ceftriaxone) Complete course of abx;    MISC  Minimize sedating meds

## 2020-07-27 NOTE — SWALLOW BEDSIDE ASSESSMENT ADULT - SWALLOW EVAL: DIAGNOSIS
PO trials limited solely d/t pt's refusal. Oropharyngeal swallow was clinically judged to be WFL with trials. No clinical overt s/s of aspiration. Cont. with regular diet as tolerated. Will reassess when pt more willing to participate.

## 2020-07-27 NOTE — PROGRESS NOTE ADULT - PROBLEM SELECTOR PLAN 3
Refer to plan above. - Left leg cellulitis, warm, erythema, swelling  - Started vanco  - Vascular - Left leg cellulitis, warm, erythema, swelling  - Started vanco  - f/u LE US doppler   - Vascular seen, f/u with Vascular after US doppler report

## 2020-07-27 NOTE — PROGRESS NOTE ADULT - SUBJECTIVE AND OBJECTIVE BOX
INTERVAL HPI/OVERNIGHT EVENTS:    SUBJECTIVE: Patient seen and examined at bedside.    OBJECTIVE:    VITAL SIGNS:  ICU Vital Signs Last 24 Hrs  T(C): 37.1 (27 Jul 2020 05:23), Max: 37.1 (27 Jul 2020 05:23)  T(F): 98.7 (27 Jul 2020 05:23), Max: 98.7 (27 Jul 2020 05:23)  HR: 94 (27 Jul 2020 04:00) (85 - 102)  BP: 100/55 (27 Jul 2020 04:00) (84/45 - 112/54)  BP(mean): 74 (27 Jul 2020 04:00) (61 - 78)  ABP: --  ABP(mean): --  RR: 16 (27 Jul 2020 04:00) (16 - 20)  SpO2: 94% (27 Jul 2020 04:00) (93% - 100%)        CAPILLARY BLOOD GLUCOSE      POCT Blood Glucose.: 128 mg/dL (25 Jul 2020 16:38)      PHYSICAL EXAM:    General: WDWN ; NAD  HEENT: NC/AT; PERRL, anicteric sclera  Neck: supple, no JVD  Respiratory: CTA B/L; no W/R/R  Cardiovascular: +S1/S2; RRR; no M/R/G  Gastrointestinal: soft, NT/ND; +BS x4  Extremities: WWP; 2+ peripheral pulses B/L; no LE edema  Skin: normal color and turgor; no rash  Neurological:     MEDICATIONS:  MEDICATIONS  (STANDING):  cefTRIAXone   IVPB 1000 milliGRAM(s) IV Intermittent every 24 hours  heparin   Injectable 5000 Unit(s) SubCutaneous every 8 hours  risperiDONE   Tablet 0.5 milliGRAM(s) Oral two times a day    MEDICATIONS  (PRN):  albumin human 25% IVPB 50 milliLiter(s) IV Intermittent every 1 hour PRN to maintain sbp> 100 mmhg intradialysis      ALLERGIES:  Allergies    IV Contrast (Anaphylaxis)  Levaquin (Angioedema)  penicillin (Angioedema)    Intolerances        LABS:                        9.9    14.22 )-----------( 143      ( 26 Jul 2020 07:21 )             31.2     07-26    138  |  98  |  78<H>  ----------------------------<  87  5.2   |  19<L>  |  6.18<H>    Ca    7.8<L>      26 Jul 2020 06:10  Phos  7.4     07-26  Mg     2.3     07-26    TPro  5.4<L>  /  Alb  3.2<L>  /  TBili  0.4  /  DBili  x   /  AST  29  /  ALT  10  /  AlkPhos  56  07-26    LIVER FUNCTIONS - ( 26 Jul 2020 06:10 )  Alb: 3.2 g/dL / Pro: 5.4 g/dL / ALK PHOS: 56 U/L / ALT: 10 U/L / AST: 29 U/L / GGT: x           PT/INR - ( 25 Jul 2020 14:09 )   PT: 13.7 sec;   INR: 1.15          PTT - ( 25 Jul 2020 14:09 )  PTT:34.4 sec  Urinalysis Basic - ( 25 Jul 2020 14:52 )    Color: Yellow / Appearance: Clear / SG: >=1.030 / pH: x  Gluc: x / Ketone: NEGATIVE  / Bili: Moderate / Urobili: 1.0 E.U./dL   Blood: x / Protein: >=300 mg/dL / Nitrite: POSITIVE   Leuk Esterase: Moderate / RBC: < 5 /HPF / WBC Many /HPF   Sq Epi: x / Non Sq Epi: 0-5 /HPF / Bacteria: Many /HPF      CARDIAC MARKERS ( 26 Jul 2020 06:10 )  x     / x     / 1070 U/L / x     / x      CARDIAC MARKERS ( 26 Jul 2020 00:32 )  x     / 0.09 ng/mL / 1344 U/L / x     / 12.7 ng/mL  CARDIAC MARKERS ( 25 Jul 2020 14:09 )  x     / 0.12 ng/mL / x     / x     / x            RADIOLOGY & ADDITIONAL TESTS: Reviewed. INTERVAL HPI/OVERNIGHT EVENTS:    SUBJECTIVE: Patient seen and examined at bedside.    OBJECTIVE:    VITAL SIGNS:  ICU Vital Signs Last 24 Hrs  T(C): 37.1 (27 Jul 2020 05:23), Max: 37.1 (27 Jul 2020 05:23)  T(F): 98.7 (27 Jul 2020 05:23), Max: 98.7 (27 Jul 2020 05:23)  HR: 94 (27 Jul 2020 04:00) (85 - 102)  BP: 100/55 (27 Jul 2020 04:00) (84/45 - 112/54)  BP(mean): 74 (27 Jul 2020 04:00) (61 - 78)  ABP: --  ABP(mean): --  RR: 16 (27 Jul 2020 04:00) (16 - 20)  SpO2: 94% (27 Jul 2020 04:00) (93% - 100%)        CAPILLARY BLOOD GLUCOSE      POCT Blood Glucose.: 128 mg/dL (25 Jul 2020 16:38)      PHYSICAL EXAM:    General: WDWN ; NAD  HEENT: NC/AT; PERRL, anicteric sclera  Neck: supple, no JVD  Respiratory: CTA B/L; no W/R/R  Cardiovascular: +S1/S2; RRR; no M/R/G  Gastrointestinal: soft, NT/ND; +BS x4  Extremities: WWP; 2+ peripheral pulses B/L; Bilat Le stasis changes with erythema/warmth over left LE  Skin: normal color and turgor; no rash  Neurological:     MEDICATIONS:  MEDICATIONS  (STANDING):  cefTRIAXone   IVPB 1000 milliGRAM(s) IV Intermittent every 24 hours  heparin   Injectable 5000 Unit(s) SubCutaneous every 8 hours  risperiDONE   Tablet 0.5 milliGRAM(s) Oral two times a day    MEDICATIONS  (PRN):  albumin human 25% IVPB 50 milliLiter(s) IV Intermittent every 1 hour PRN to maintain sbp> 100 mmhg intradialysis      ALLERGIES:  Allergies    IV Contrast (Anaphylaxis)  Levaquin (Angioedema)  penicillin (Angioedema)    Intolerances        LABS:                        9.9    14.22 )-----------( 143      ( 26 Jul 2020 07:21 )             31.2     07-26    138  |  98  |  78<H>  ----------------------------<  87  5.2   |  19<L>  |  6.18<H>    Ca    7.8<L>      26 Jul 2020 06:10  Phos  7.4     07-26  Mg     2.3     07-26    TPro  5.4<L>  /  Alb  3.2<L>  /  TBili  0.4  /  DBili  x   /  AST  29  /  ALT  10  /  AlkPhos  56  07-26    LIVER FUNCTIONS - ( 26 Jul 2020 06:10 )  Alb: 3.2 g/dL / Pro: 5.4 g/dL / ALK PHOS: 56 U/L / ALT: 10 U/L / AST: 29 U/L / GGT: x           PT/INR - ( 25 Jul 2020 14:09 )   PT: 13.7 sec;   INR: 1.15          PTT - ( 25 Jul 2020 14:09 )  PTT:34.4 sec  Urinalysis Basic - ( 25 Jul 2020 14:52 )    Color: Yellow / Appearance: Clear / SG: >=1.030 / pH: x  Gluc: x / Ketone: NEGATIVE  / Bili: Moderate / Urobili: 1.0 E.U./dL   Blood: x / Protein: >=300 mg/dL / Nitrite: POSITIVE   Leuk Esterase: Moderate / RBC: < 5 /HPF / WBC Many /HPF   Sq Epi: x / Non Sq Epi: 0-5 /HPF / Bacteria: Many /HPF      CARDIAC MARKERS ( 26 Jul 2020 06:10 )  x     / x     / 1070 U/L / x     / x      CARDIAC MARKERS ( 26 Jul 2020 00:32 )  x     / 0.09 ng/mL / 1344 U/L / x     / 12.7 ng/mL  CARDIAC MARKERS ( 25 Jul 2020 14:09 )  x     / 0.12 ng/mL / x     / x     / x            RADIOLOGY & ADDITIONAL TESTS: Reviewed. Hospital course:  Transfer from [facility] to [facility]      Ms. 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 She was unable to give a clear history at that time of interview due to Altered mental status.         ED Course:  VS: Tmax: 97.2, HR: 48, BP: 117/81 , RR: 16, O2: 100%. When patient was examined in the ED, she was alert, awake but confused, non cooperative shaky.  Labs was notable for leukocytosis, normal lactate, acute renal failure with anion gap acidosis and hyperkalemia cr 9.03, K 7.8 and Bicarb 17, . ECG with peaked t waves. started insulin/glucose, Lokelma, bicarb, calcium iv, lokelma. K corrected to 6.6  ivf administered, zabala placed.  purulent urine noted.  started ceftriaxone.  1L ns given.  also with troponin elevation, Nephrology consulted for urgent ED and vascular surgery consulted for future fistula placement. She was admitted for urgent HD medicine telemetry.                    INTERVAL HPI/OVERNIGHT EVENTS:    SUBJECTIVE: Patient seen and examined at bedside.    OBJECTIVE:    VITAL SIGNS:  ICU Vital Signs Last 24 Hrs  T(C): 37.1 (27 Jul 2020 05:23), Max: 37.1 (27 Jul 2020 05:23)  T(F): 98.7 (27 Jul 2020 05:23), Max: 98.7 (27 Jul 2020 05:23)  HR: 94 (27 Jul 2020 04:00) (85 - 102)  BP: 100/55 (27 Jul 2020 04:00) (84/45 - 112/54)  BP(mean): 74 (27 Jul 2020 04:00) (61 - 78)  ABP: --  ABP(mean): --  RR: 16 (27 Jul 2020 04:00) (16 - 20)  SpO2: 94% (27 Jul 2020 04:00) (93% - 100%)        CAPILLARY BLOOD GLUCOSE      POCT Blood Glucose.: 128 mg/dL (25 Jul 2020 16:38)      PHYSICAL EXAM:    General: WDWN ; NAD  HEENT: NC/AT; PERRL, anicteric sclera  Neck: supple, no JVD  Respiratory: CTA B/L; no W/R/R  Cardiovascular: +S1/S2; RRR; no M/R/G  Gastrointestinal: soft, NT/ND; +BS x4  Extremities: WWP; 2+ peripheral pulses B/L; Bilat Le stasis changes with erythema/warmth over left LE  Skin: normal color and turgor; no rash  Neurological:     MEDICATIONS:  MEDICATIONS  (STANDING):  cefTRIAXone   IVPB 1000 milliGRAM(s) IV Intermittent every 24 hours  heparin   Injectable 5000 Unit(s) SubCutaneous every 8 hours  risperiDONE   Tablet 0.5 milliGRAM(s) Oral two times a day    MEDICATIONS  (PRN):  albumin human 25% IVPB 50 milliLiter(s) IV Intermittent every 1 hour PRN to maintain sbp> 100 mmhg intradialysis      ALLERGIES:  Allergies    IV Contrast (Anaphylaxis)  Levaquin (Angioedema)  penicillin (Angioedema)    Intolerances        LABS:                        9.9    14.22 )-----------( 143      ( 26 Jul 2020 07:21 )             31.2     07-26    138  |  98  |  78<H>  ----------------------------<  87  5.2   |  19<L>  |  6.18<H>    Ca    7.8<L>      26 Jul 2020 06:10  Phos  7.4     07-26  Mg     2.3     07-26    TPro  5.4<L>  /  Alb  3.2<L>  /  TBili  0.4  /  DBili  x   /  AST  29  /  ALT  10  /  AlkPhos  56  07-26    LIVER FUNCTIONS - ( 26 Jul 2020 06:10 )  Alb: 3.2 g/dL / Pro: 5.4 g/dL / ALK PHOS: 56 U/L / ALT: 10 U/L / AST: 29 U/L / GGT: x           PT/INR - ( 25 Jul 2020 14:09 )   PT: 13.7 sec;   INR: 1.15          PTT - ( 25 Jul 2020 14:09 )  PTT:34.4 sec  Urinalysis Basic - ( 25 Jul 2020 14:52 )    Color: Yellow / Appearance: Clear / SG: >=1.030 / pH: x  Gluc: x / Ketone: NEGATIVE  / Bili: Moderate / Urobili: 1.0 E.U./dL   Blood: x / Protein: >=300 mg/dL / Nitrite: POSITIVE   Leuk Esterase: Moderate / RBC: < 5 /HPF / WBC Many /HPF   Sq Epi: x / Non Sq Epi: 0-5 /HPF / Bacteria: Many /HPF      CARDIAC MARKERS ( 26 Jul 2020 06:10 )  x     / x     / 1070 U/L / x     / x      CARDIAC MARKERS ( 26 Jul 2020 00:32 )  x     / 0.09 ng/mL / 1344 U/L / x     / 12.7 ng/mL  CARDIAC MARKERS ( 25 Jul 2020 14:09 )  x     / 0.12 ng/mL / x     / x     / x            RADIOLOGY & ADDITIONAL TESTS: Reviewed. Hospital course:  Transfer from [facility] to [facility]      Ms. 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 In the ED, She was awake, confused, non cooperative, shaky, labs showed leukocytosis, , K 7.8, Cr 9.03, bicarb 17, normal lactate, ARF with anion gap acidosis. ECG peaked T waves. Elevated troponin, UA +. She was admitted for urgent HD medicine telemetry with nephrology. CT suggestive of chronic atrophic kidneys / CKD .  She was started on vEEG to r/o Seizure. Keppra 500 daily started for epileptiform activity on EEG. Psych recommended meds and No psychiatric contraindications to discharge.  BL LE dopplers US ordered for swelling of LE (Left leg cellulitis to r/u DVT). Vascular surgery consulted and Vanco 1250 started for cellulitis. IR consulted for lymph node biopsy for adenopathy on CT. UCx showed GNR responding to ceftriaxone. TTE to assess cardiac function and HD per renal recs.        INTERVAL HPI/ OVERNIGHT EVENTS: Got 50ml 25% albumin with HD for hypotension.    SUBJECTIVE/ OBJECTIVE: Patient seen and examined at bedside.       VITAL SIGNS:  ICU Vital Signs Last 24 Hrs  T(C): 37.1 (27 Jul 2020 05:23), Max: 37.1 (27 Jul 2020 05:23)  T(F): 98.7 (27 Jul 2020 05:23), Max: 98.7 (27 Jul 2020 05:23)  HR: 94 (27 Jul 2020 04:00) (85 - 102)  BP: 100/55 (27 Jul 2020 04:00) (84/45 - 112/54)  BP(mean): 74 (27 Jul 2020 04:00) (61 - 78)  ABP: --  ABP(mean): --  RR: 16 (27 Jul 2020 04:00) (16 - 20)  SpO2: 94% (27 Jul 2020 04:00) (93% - 100%)        CAPILLARY BLOOD GLUCOSE      POCT Blood Glucose.: 128 mg/dL (25 Jul 2020 16:38)      PHYSICAL EXAM:  Constitutional: Appears anxious, no acute distress  Neck: Supple; large right supraclavicular soft mass present, non tender   Respiratory: CTA B/L; no wheezes/crackles   Cardiovascular: Regular rhythm/rate; S1/S2   Gastrointestinal: Soft; NTND; normoactive BS  : Sorenson in place - not draining urine   Extremities: RUE twitching/tremor; bilateral lymphedema, venous statis dermatitis. R groin HD catheter in-place. Left Leg cellulitis, warm, erythema  Neurologic: AAOx 3, Alert, confused, not cooperative with exam    MEDICATIONS:  MEDICATIONS  (STANDING):  cefTRIAXone   IVPB 1000 milliGRAM(s) IV Intermittent every 24 hours  heparin   Injectable 5000 Unit(s) SubCutaneous every 8 hours  risperiDONE   Tablet 0.5 milliGRAM(s) Oral two times a day    MEDICATIONS  (PRN):  albumin human 25% IVPB 50 milliLiter(s) IV Intermittent every 1 hour PRN to maintain sbp> 100 mmhg intradialysis      ALLERGIES:  Allergies    IV Contrast (Anaphylaxis)  Levaquin (Angioedema)  penicillin (Angioedema)    Intolerances        LABS:                        9.9    14.22 )-----------( 143      ( 26 Jul 2020 07:21 )             31.2     07-26    138  |  98  |  78<H>  ----------------------------<  87  5.2   |  19<L>  |  6.18<H>    Ca    7.8<L>      26 Jul 2020 06:10  Phos  7.4     07-26  Mg     2.3     07-26    TPro  5.4<L>  /  Alb  3.2<L>  /  TBili  0.4  /  DBili  x   /  AST  29  /  ALT  10  /  AlkPhos  56  07-26    LIVER FUNCTIONS - ( 26 Jul 2020 06:10 )  Alb: 3.2 g/dL / Pro: 5.4 g/dL / ALK PHOS: 56 U/L / ALT: 10 U/L / AST: 29 U/L / GGT: x           PT/INR - ( 25 Jul 2020 14:09 )   PT: 13.7 sec;   INR: 1.15          PTT - ( 25 Jul 2020 14:09 )  PTT:34.4 sec  Urinalysis Basic - ( 25 Jul 2020 14:52 )    Color: Yellow / Appearance: Clear / SG: >=1.030 / pH: x  Gluc: x / Ketone: NEGATIVE  / Bili: Moderate / Urobili: 1.0 E.U./dL   Blood: x / Protein: >=300 mg/dL / Nitrite: POSITIVE   Leuk Esterase: Moderate / RBC: < 5 /HPF / WBC Many /HPF   Sq Epi: x / Non Sq Epi: 0-5 /HPF / Bacteria: Many /HPF      CARDIAC MARKERS ( 26 Jul 2020 06:10 )  x     / x     / 1070 U/L / x     / x      CARDIAC MARKERS ( 26 Jul 2020 00:32 )  x     / 0.09 ng/mL / 1344 U/L / x     / 12.7 ng/mL  CARDIAC MARKERS ( 25 Jul 2020 14:09 )  x     / 0.12 ng/mL / x     / x     / x            RADIOLOGY & ADDITIONAL TESTS: Reviewed. Hospital course:  Transfer from [facility] to [facility]      Ms. 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 In the ED, She was awake, confused, non cooperative, shaky, labs showed leukocytosis, , K 7.8, Cr 9.03, bicarb 17, normal lactate, ARF with anion gap acidosis. ECG peaked T waves. Elevated troponin, UA +. She was admitted for urgent HD medicine telemetry with nephrology. CT suggestive of chronic atrophic kidneys / CKD .  She was started on vEEG to r/o Seizure. Keppra 500 daily started for epileptiform activity on EEG. Psych recommended meds and No psychiatric contraindications to discharge.  BL LE dopplers US ordered for swelling of LE (Left leg cellulitis to r/u DVT). Vascular surgery consulted and got one dose Vanco 1250 for cellulitis. IR consulted for lymph node biopsy for adenopathy on CT. UCx showed GNR responding to ceftriaxone. TTE to assess cardiac function and HD per renal recs.        INTERVAL HPI/ OVERNIGHT EVENTS: Got 50ml 25% albumin with HD for hypotension.    SUBJECTIVE/ OBJECTIVE: Patient seen and examined at bedside. She Reports no complaint. denies n/v, SOB, Chest pain, abdominal pain. Right hand shaking. Request more water to drink and denies any PMHx of HD, Renal problem. She does not answer questions appropriate. AAOx 3.       VITAL SIGNS:  ICU Vital Signs Last 24 Hrs  T(C): 37.1 (27 Jul 2020 05:23), Max: 37.1 (27 Jul 2020 05:23)  T(F): 98.7 (27 Jul 2020 05:23), Max: 98.7 (27 Jul 2020 05:23)  HR: 94 (27 Jul 2020 04:00) (85 - 102)  BP: 100/55 (27 Jul 2020 04:00) (84/45 - 112/54)  BP(mean): 74 (27 Jul 2020 04:00) (61 - 78)  ABP: --  ABP(mean): --  RR: 16 (27 Jul 2020 04:00) (16 - 20)  SpO2: 94% (27 Jul 2020 04:00) (93% - 100%)        CAPILLARY BLOOD GLUCOSE      POCT Blood Glucose.: 128 mg/dL (25 Jul 2020 16:38)      PHYSICAL EXAM:  Constitutional: Appears anxious, no acute distress  Neck: Supple; large right supraclavicular soft mass present, non tender   Respiratory: CTA B/L; no wheezes/crackles   Cardiovascular: Regular rhythm/rate; S1/S2   Gastrointestinal: Soft; NTND; normoactive BS  : Sorenson in place - not draining urine   Extremities: RUE twitching/tremor; bilateral lymphedema, venous statis dermatitis. R groin HD catheter in-place. Left Leg cellulitis, warm, erythema  Neurologic: AAOx 3, Alert, confused, not cooperative with exam    MEDICATIONS:  MEDICATIONS  (STANDING):  cefTRIAXone   IVPB 1000 milliGRAM(s) IV Intermittent every 24 hours  heparin   Injectable 5000 Unit(s) SubCutaneous every 8 hours  risperiDONE   Tablet 0.5 milliGRAM(s) Oral two times a day    MEDICATIONS  (PRN):  albumin human 25% IVPB 50 milliLiter(s) IV Intermittent every 1 hour PRN to maintain sbp> 100 mmhg intradialysis      ALLERGIES:  Allergies    IV Contrast (Anaphylaxis)  Levaquin (Angioedema)  penicillin (Angioedema)    Intolerances        LABS:                        9.9    14.22 )-----------( 143      ( 26 Jul 2020 07:21 )             31.2     07-26    138  |  98  |  78<H>  ----------------------------<  87  5.2   |  19<L>  |  6.18<H>    Ca    7.8<L>      26 Jul 2020 06:10  Phos  7.4     07-26  Mg     2.3     07-26    TPro  5.4<L>  /  Alb  3.2<L>  /  TBili  0.4  /  DBili  x   /  AST  29  /  ALT  10  /  AlkPhos  56  07-26    LIVER FUNCTIONS - ( 26 Jul 2020 06:10 )  Alb: 3.2 g/dL / Pro: 5.4 g/dL / ALK PHOS: 56 U/L / ALT: 10 U/L / AST: 29 U/L / GGT: x           PT/INR - ( 25 Jul 2020 14:09 )   PT: 13.7 sec;   INR: 1.15          PTT - ( 25 Jul 2020 14:09 )  PTT:34.4 sec  Urinalysis Basic - ( 25 Jul 2020 14:52 )    Color: Yellow / Appearance: Clear / SG: >=1.030 / pH: x  Gluc: x / Ketone: NEGATIVE  / Bili: Moderate / Urobili: 1.0 E.U./dL   Blood: x / Protein: >=300 mg/dL / Nitrite: POSITIVE   Leuk Esterase: Moderate / RBC: < 5 /HPF / WBC Many /HPF   Sq Epi: x / Non Sq Epi: 0-5 /HPF / Bacteria: Many /HPF      CARDIAC MARKERS ( 26 Jul 2020 06:10 )  x     / x     / 1070 U/L / x     / x      CARDIAC MARKERS ( 26 Jul 2020 00:32 )  x     / 0.09 ng/mL / 1344 U/L / x     / 12.7 ng/mL  CARDIAC MARKERS ( 25 Jul 2020 14:09 )  x     / 0.12 ng/mL / x     / x     / x            RADIOLOGY & ADDITIONAL TESTS: Reviewed.

## 2020-07-27 NOTE — CONSULT NOTE ADULT - ASSESSMENT
74-year-old woman with prior intracranial hemorrhage with residual left-sided weakness, now with altered mental status, sepsis, and acute kidney injury, as well as right arm shaking c/f seizure.  EEG does not show any electrographic seizures but does show abnormal epileptiform activity in the left posterior quadrant, so would recommend initiating an AED in order to prevent progression to clinical or electrographic seizures.    -Start Keppra HD dosing  -Continue vEEG for 24 more hours    Will follow

## 2020-07-27 NOTE — PROGRESS NOTE ADULT - ATTENDING COMMENTS
Appreciate epilepsy follow up. Keppra started. Check LE dopplers. Vascular to see for LE cellulitis/stasis changes and Elle added. Plan for lymph node bx by IR.

## 2020-07-27 NOTE — PROGRESS NOTE ADULT - PROBLEM SELECTOR PLAN 3
Left leg warm, erythematous, non purulent, and swollen.   -f/u LE US doppler   -Vascular seen. f/u with Vascular after US doppler report  -received Vanc once. Consider continuing Left leg warm, erythematous, non purulent, and swollen.   -f/u LE US doppler   -Vascular seen. f/u with Vascular after US doppler report  -c/w ceftriaxone 1g IV q24h, as she is already being treated for UTI  -received Vanc once. Consider continuing

## 2020-07-27 NOTE — BEHAVIORAL HEALTH ASSESSMENT NOTE - SUMMARY
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  No dangerousness evident. Pt. does not currently represent a danger to herself or others and does not meet criteria for Involuntary Psychiatric Hospitalization.  It appears pt was prescribed Risperdal 0.5mg BId while admitted to most recent rehab facility, likely for agitation though in the setting of pt no wishing to remain in said facility; she has now gone several days without this medication and there has been no agitation or development of acute psychiatric symptoms. Recommend to discontinue Risperdal and observe, may use quetiapine 12.mg BID prn agitation if necessary.

## 2020-07-27 NOTE — CHART NOTE - NSCHARTNOTEFT_GEN_A_CORE
IR consulted for biopsy of incidentally noted pelvic lymphadenopathy on CTAP.  Imaging reviewed with attending physician, Dr. Ferrara.    - recommend follow up as outpatient for non emergent lymph node biopsy (IR scheduling office [phone])

## 2020-07-27 NOTE — BEHAVIORAL HEALTH ASSESSMENT NOTE - RISK ASSESSMENT
Low Acute Suicide Risk No hx of attempt and no current SI  Current Risk: UNLIKELY  Current Risk will be mitigated by providing pt with a safe and supportive environment.

## 2020-07-27 NOTE — PROGRESS NOTE ADULT - PROBLEM SELECTOR PLAN 7
F: Replete as needed  E: Per nephrology recs  N: Renal diet  D: Heparin 5000 q8hr Incidental discovery of pelvic lymphadenopathy on CTAP.   -IR consulted for biopsy. Recommend follow up as outpatient for non emergent lymph node biopsy (IR scheduling office [phone]).

## 2020-07-27 NOTE — CONSULT NOTE ADULT - SUBJECTIVE AND OBJECTIVE BOX
History of Present Illness:  74-year-old woman with prior intracranial hemorrhage with residual left-sided weakness, admitted with altered mental status, sepsis, and acute kidney injury.  Last night had an episode of right arm shaking and was connected to video EEG due to concern for seizure.    Review of Systems:  Unable to obtain due to patient's mental status    PAST MEDICAL & SURGICAL HISTORY:  Acute renal failure (ARF)  Venous stasis dermatitis  (HFpEF) heart failure with preserved ejection fraction  Osteoarthritis of both knees, unspecified osteoarthritis type  Hemorrhagic cerebrovascular accident (CVA): 2013  Essential hypertension  H/O: hysterectomy: at age 41    MEDICATIONS  (STANDING):  cefTRIAXone   IVPB 1000 milliGRAM(s) IV Intermittent every 24 hours  heparin   Injectable 5000 Unit(s) SubCutaneous every 8 hours  levETIRAcetam 500 milliGRAM(s) Oral daily    MEDICATIONS  (PRN):  albumin human 25% IVPB 50 milliLiter(s) IV Intermittent every 1 hour PRN to maintain sbp> 100 mmhg intradialysis  QUEtiapine 12.5 milliGRAM(s) Oral every 12 hours PRN Agitation    Allergies:  IV Contrast (Anaphylaxis)  Levaquin (Angioedema)  penicillin (Angioedema)    Family History:  Unable to obtain due to patient's mental status    Social History:  Unable to obtain due to patient's mental status    Physical Exam:    T(C): 37.2 (07-27-20 @ 18:04), Max: 37.2 (07-27-20 @ 18:04)  HR: 90 (07-27-20 @ 18:04) (67 - 102)  BP: 140/83 (07-27-20 @ 18:04) (91/51 - 141/63)  RR: 17 (07-27-20 @ 18:04) (16 - 20)  SpO2: 94% (07-27-20 @ 18:04) (93% - 98%)    Constitutional: no apparent distress  HEENT: moist mucous membranes, oropharynx clear  Musculoskeletal: extremities warm, well-perfused, normal range of motion  Skin: no rashes, bruises, or lesions    Neurologic Exam:  Mental Status: awake and alert, oriented to time, place, and person, follows simple commands  Cranial Nerves: I: deferred; II: pupils equal round and reactiveIII, IV, VI: extraocular movements full with no nystagmus; VII: facial power symmetric; VIII: hearing intact to voice; IX/X: no dysarthria; XI: shoulder shrug symmetric; XII: tongue protrudes midline  Motor: normal bulk and tone, no orbiting or pronator drift, intermittent action tremor of upper extremities bilaterally   Sensation: intact to light touch in distal upper and lower extremities bilaterally  Coordination: finger-nose-finger intact bilaterally      Labs:                        9.4    11.70 )-----------( 122      ( 27 Jul 2020 11:07 )             31.1     07-27    135  |  98  |  59<H>  ----------------------------<  93  4.8   |  19<L>  |  5.11<H>    Ca    8.1<L>      27 Jul 2020 11:07  Phos  6.1     07-27  Mg     2.3     07-27    TPro  5.4<L>  /  Alb  3.2<L>  /  TBili  0.4  /  DBili  x   /  AST  29  /  ALT  10  /  AlkPhos  56  07-26    LIVER FUNCTIONS - ( 26 Jul 2020 06:10 )  Alb: 3.2 g/dL / Pro: 5.4 g/dL / ALK PHOS: 56 U/L / ALT: 10 U/L / AST: 29 U/L / GGT: x             EEG    7/26-7/27  Daily Summary:    1)	Abundant left posterior quadrant spikes and sharp waves, indicating increased epileptic potential in this region.  2)	Moderate generalized background slowing, often sharply contoured, indicating diffuse or multifocal cerebral dysunction.  3)	No seizures or significant clinical events.    Read by:  Apryl Parks MD    CT head 7/25 independently viewed  Diffuse atrophy, maximal in the frontal and temporal regions

## 2020-07-27 NOTE — PROGRESS NOTE ADULT - PROBLEM SELECTOR PLAN 5
-Nitrite +, LEC +, Bacteria +  -Continue 1 gr ceftriaxone   -f/u urine culture -K 7.8 in ED, wnl after HD  -Continue to monitor -K 7.8 in ED, wnl after HD  -Continue to monitor  - f/U Nephro rec

## 2020-07-27 NOTE — PROGRESS NOTE ADULT - ATTENDING COMMENTS
oligoanuric KATHARINA with underlying advanced CKD  as above underwent urgent dialysis  for repeat dialysis today

## 2020-07-27 NOTE — BEHAVIORAL HEALTH ASSESSMENT NOTE - NSBHCHARTREVIEWVS_PSY_A_CORE FT
Vital Signs Last 24 Hrs  T(C): 36.6 (27 Jul 2020 12:58), Max: 37.1 (27 Jul 2020 05:23)  T(F): 97.8 (27 Jul 2020 12:58), Max: 98.7 (27 Jul 2020 05:23)  HR: 100 (27 Jul 2020 13:23) (85 - 102)  BP: 130/72 (27 Jul 2020 13:23) (91/51 - 141/63)  BP(mean): 93 (27 Jul 2020 13:23) (64 - 93)  RR: 16 (27 Jul 2020 13:23) (16 - 20)  SpO2: 95% (27 Jul 2020 13:23) (93% - 100%)

## 2020-07-27 NOTE — PROGRESS NOTE ADULT - ASSESSMENT
74F admitted for altered mental status w/concomitant acute renal injury associated with hyperkalemia and uremic encephalopathy and UTI     #anuric KATHARINA on advanced CKD   limited collateral information re baseline kidney function, patient states she has never been on dialysis previously   CT suggestive of chronic atrophic kidneys / CKD   differential includes ATN ischemic vs toxic, less likely pre-renal or post-renal as history and clinical picture not suggestive and pt remains anuric w/hx of CKD (Cr 2.9 on 07/04/2020) and atrophic kidneys   given limited collateral, uncertain etiology of KATHARINA   recovery possible but given advanced kidney disease, likely dialysis dependent   - will proceed with repeat HD today 7/27 for clearance and further optimization of mental status with concerns for possible renally cleared neurotoxic agents   - c/w strict IOs   - please obtain iron panel, PTH, 25 and 1-25 Vit D level  - continue with phoslo 667 mg TID w/meals 74F admitted for altered mental status w/concomitant acute renal injury associated with hyperkalemia and uremic encephalopathy and UTI     #anuric KATHARINA on advanced CKD   limited collateral information re baseline kidney function, patient states she has never been on dialysis previously   CT suggestive of chronic atrophic kidneys / CKD   differential includes ATN ischemic vs toxic, less likely pre-renal or post-renal as history and clinical picture not suggestive and pt remains anuric w/hx of CKD (Cr 2.9 on 07/04/2020) and atrophic kidneys   given limited collateral, uncertain etiology of KATHARINA   recovery possible but given advanced kidney disease, likely dialysis dependent   - will proceed with repeat HD today 7/27 w/[facility]acle[facility] for clearance and further optimization of mental status with concerns for possible renally cleared neurotoxic agents   - BMP/Mg/Phos pending   - CBC pending, trend platelet count   - c/w strict IOs   - please obtain iron panel, PTH, 25 and 1-25 Vit D level  - continue with phoslo 667 mg TID w/meals 74F admitted for altered mental status w/concomitant acute renal injury associated with hyperkalemia and uremic encephalopathy and UTI     #anuric KATHARINA on advanced CKD   limited collateral information re baseline kidney function, patient states she has never been on dialysis previously   given limited collateral, uncertain etiology of KATHARINA   CT suggestive of chronic atrophic kidneys / CKD   differential includes ATN ischemic vs toxic, less likely pre-renal or post-renal as history and clinical picture not suggestive and pt remains anuric w/hx of CKD (Cr 2.9 on 07/04/2020) and atrophic kidneys   recovery possible but given advanced kidney disease, likely dialysis dependent   - will proceed with repeat HD today 7/27 for clearance and further optimization of mental status with concerns for possible renally cleared neurotoxic agents   - hemodialysis w/Revacle[facility] to reduce variables contributing to thrombocytopenia, trend platelet count   - continue with strict IOs   - pending iron panel, PTH, 25 and 1-25 Vit D level  - continue with phoslo 667 mg TID w/meals

## 2020-07-27 NOTE — PROGRESS NOTE ADULT - SUBJECTIVE AND OBJECTIVE BOX
Patient is a 74y Female seen and evaluated at bedside. No complaints. Remains anuric. BP acceptable.       Meds:    albumin human 25% IVPB 50 every 1 hour PRN  cefTRIAXone   IVPB 1000 every 24 hours  heparin   Injectable 5000 every 8 hours  levETIRAcetam 250 two times a day  risperiDONE   Tablet 0.5 two times a day  vancomycin  IVPB 1000 once      T(C): , Max: 37.1 (07-27-20 @ 05:23)  T(F): , Max: 98.7 (07-27-20 @ 05:23)  HR: 100 (07-27-20 @ 08:23)  BP: 141/63 (07-27-20 @ 08:23)  BP(mean): 91 (07-27-20 @ 08:23)  RR: 16 (07-27-20 @ 08:23)  SpO2: 94% (07-27-20 @ 08:23)  Wt(kg): --        Review of Systems:  RESPIRATORY: No shortness of breath  CARDIOVASCULAR: No chest pain or shortness of breath  GASTROINTESTINAL: No abdominal pain or vomiting   MUSCULOSKELETAL: No leg edema      PHYSICAL EXAM:  GENERAL: no acute distress at present  CHEST/LUNG: Clear to auscultation bilaterally  HEART: normal S1S2, RRR  ABDOMEN: Soft, Nontender   EXTREMITIES: No edema   NEUROLOGY: A&O x2, no focal neurological deficit  ACCESS: R femoral dialysis catheter       LABS:                        9.9    14.22 )-----------( 143      ( 26 Jul 2020 07:21 )             31.2     07-26    138  |  98  |  78<H>  ----------------------------<  87  5.2   |  19<L>  |  6.18<H>    Ca    7.8<L>      26 Jul 2020 06:10  Phos  7.4     07-26  Mg     2.3     07-26    TPro  5.4<L>  /  Alb  3.2<L>  /  TBili  0.4  /  DBili  x   /  AST  29  /  ALT  10  /  AlkPhos  56  07-26      PT/INR - ( 25 Jul 2020 14:09 )   PT: 13.7 sec;   INR: 1.15          PTT - ( 25 Jul 2020 14:09 )  PTT:34.4 sec  Urinalysis Basic - ( 25 Jul 2020 14:52 )    Color: Yellow / Appearance: Clear / SG: >=1.030 / pH: x  Gluc: x / Ketone: NEGATIVE  / Bili: Moderate / Urobili: 1.0 E.U./dL   Blood: x / Protein: >=300 mg/dL / Nitrite: POSITIVE   Leuk Esterase: Moderate / RBC: < 5 /HPF / WBC Many /HPF   Sq Epi: x / Non Sq Epi: 0-5 /HPF / Bacteria: Many /HPF            RADIOLOGY & ADDITIONAL STUDIES:

## 2020-07-27 NOTE — PROGRESS NOTE ADULT - PROBLEM SELECTOR PLAN 1
Nephro following, s/p HD x 2. Minimal urine output since admission, Continue monitoring I/O  -Nephro consulted, appreciate reccs  -f/u Nephro  -Vascular following for potential fistula placement if there are no signs of recovery after HD after cellulitis resolved.

## 2020-07-27 NOTE — PROGRESS NOTE ADULT - SUBJECTIVE AND OBJECTIVE BOX
Hospital Course:  Ms. Salazar is a 75 yo female with a PMHx hemorrhagic CVA 9/2013 with residual left sided weakness and HTN, b/l OA knees, CKD,  BIBEMS for AMS after finding down in her apartment by EMS and her Doorman. In the ED, she was awake, confused, non cooperative, shaky, labs showed leukocytosis, , K 7.8, Cr 9.03, bicarb 17, normal lactate, ARF with anion gap acidosis. ECG peaked T waves. Elevated troponin, UA +. She was admitted for urgent HD medicine telemetry with nephrology. CT suggestive of chronic atrophic kidneys / CKD. She was started on vEEG to r/o Seizure. Keppra 500 daily started for epileptiform activity on EEG. Psych recommended meds and No psychiatric contraindications to discharge. BL LE dopplers US ordered for swelling of LE (Left leg cellulitis to r/u DVT). Vascular surgery consulted and Vanco 1250 started for cellulitis. IR consulted for lymph node biopsy for adenopathy on CT. UCx showed GNR responding to ceftriaxone. TTE to assess cardiac function and HD per renal recs.      SUBJECTIVE:    Patient seen and examined at dialysis.     Denies fever, headache, nausea, vomiting, chest pain, shortness of breath, abdominal pain, changes in bowel movements or urination.      PHYSICAL EXAM:  Vital Signs Last 24 Hrs  T(C): 36.3 (27 Jul 2020 14:30), Max: 37.1 (27 Jul 2020 05:23)  T(F): 97.4 (27 Jul 2020 14:30), Max: 98.7 (27 Jul 2020 05:23)  HR: 67 (27 Jul 2020 14:30) (67 - 102)  BP: 122/64 (27 Jul 2020 14:30) (91/51 - 141/63)  BP(mean): 93 (27 Jul 2020 13:23) (64 - 100)  RR: 18 (27 Jul 2020 14:30) (16 - 20)  SpO2: 97% (27 Jul 2020 14:30) (93% - 100%)    General: NAD, laying in bed, well appearing, alert, interactive  HEENT: PERRLA, no scleral icterus, no JVD, no thyromegaly  Cardio: +S1/S2, RRR, no murmurs  Resp: CTA b/l, no w/r/r  GI: +BSx4, soft, NT/ND  Extremities: no edema, clubbing, cyanosis  Vasc: 3+ peripheral pulses  Skin: No rashes  Neuro: AAOx3, CN II-XII grossly intact, no focal deficits      MEDICATIONS  (STANDING):  cefTRIAXone   IVPB 1000 milliGRAM(s) IV Intermittent every 24 hours  heparin   Injectable 5000 Unit(s) SubCutaneous every 8 hours  levETIRAcetam 500 milliGRAM(s) Oral daily  risperiDONE   Tablet 0.5 milliGRAM(s) Oral two times a day    MEDICATIONS  (PRN):  albumin human 25% IVPB 50 milliLiter(s) IV Intermittent every 1 hour PRN to maintain sbp> 100 mmhg intradialysis        Allergies    IV Contrast (Anaphylaxis)  Levaquin (Angioedema)  penicillin (Angioedema)    Intolerances          LABS:                        9.4    11.70 )-----------( 122      ( 27 Jul 2020 11:07 )             31.1     07-27    135  |  98  |  59<H>  ----------------------------<  93  4.8   |  19<L>  |  5.11<H>    Ca    8.1<L>      27 Jul 2020 11:07  Phos  6.1     07-27  Mg     2.3     07-27    TPro  5.4<L>  /  Alb  3.2<L>  /  TBili  0.4  /  DBili  x   /  AST  29  /  ALT  10  /  AlkPhos  56  07-26      Fingerstick  glucose: POCT Blood Glucose.: 128 mg/dL (25 Jul 2020 16:38)        RADIOLOGY & ADDITIONAL TESTS: Reviewed. Hospital Course:  Ms. 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 In the ED, she was awake, confused, non cooperative, shaky, labs showed leukocytosis, , K 7.8, Cr 9.03, bicarb 17, normal lactate, ARF with anion gap acidosis. ECG peaked T waves. Elevated troponin, UA +. She was admitted for urgent HD medicine telemetry with nephrology. CT suggestive of chronic atrophic kidneys / CKD. She was started on vEEG to r/o Seizure. Keppra 500 daily started for epileptiform activity on EEG. Psych recommended meds and No psychiatric contraindications to discharge. BL LE dopplers US ordered for swelling of LE (Left leg cellulitis to r/u DVT). Vascular surgery consulted and Vanco 1250 started for cellulitis. IR consulted for lymph node biopsy for adenopathy on CT. UCx showed GNR responding to ceftriaxone. TTE to assess cardiac function and HD per renal recs.      SUBJECTIVE:    Patient seen and examined at dialysis.     Denies fever, headache, nausea, vomiting, chest pain, shortness of breath, abdominal pain, changes in bowel movements or urination.      PHYSICAL EXAM:  Vital Signs Last 24 Hrs  T(C): 36.3 (27 Jul 2020 14:30), Max: 37.1 (27 Jul 2020 05:23)  T(F): 97.4 (27 Jul 2020 14:30), Max: 98.7 (27 Jul 2020 05:23)  HR: 67 (27 Jul 2020 14:30) (67 - 102)  BP: 122/64 (27 Jul 2020 14:30) (91/51 - 141/63)  BP(mean): 93 (27 Jul 2020 13:23) (64 - 100)  RR: 18 (27 Jul 2020 14:30) (16 - 20)  SpO2: 97% (27 Jul 2020 14:30) (93% - 100%)    Constitutional: Appears anxious, no acute distress  Neck: Supple; large right supraclavicular soft mass present, non tender   Respiratory: CTA B/L; no wheezes/crackles   Cardiovascular: Regular rhythm/rate; S1/S2   Gastrointestinal: Soft; NTND; normoactive BS  : Sorenson in place - not draining urine   Extremities: RUE twitching/tremor; bilateral lymphedema, venous statis dermatitis. R groin HD catheter in-place. Left Leg cellulitis, warm, erythema  Neurologic: AAOx 3, Alert, confused, not cooperative with exam      MEDICATIONS  (STANDING):  cefTRIAXone   IVPB 1000 milliGRAM(s) IV Intermittent every 24 hours  heparin   Injectable 5000 Unit(s) SubCutaneous every 8 hours  levETIRAcetam 500 milliGRAM(s) Oral daily  risperiDONE   Tablet 0.5 milliGRAM(s) Oral two times a day    MEDICATIONS  (PRN):  albumin human 25% IVPB 50 milliLiter(s) IV Intermittent every 1 hour PRN to maintain sbp> 100 mmhg intradialysis        Allergies    IV Contrast (Anaphylaxis)  Levaquin (Angioedema)  penicillin (Angioedema)    Intolerances          LABS:                        9.4    11.70 )-----------( 122      ( 27 Jul 2020 11:07 )             31.1     07-27    135  |  98  |  59<H>  ----------------------------<  93  4.8   |  19<L>  |  5.11<H>    Ca    8.1<L>      27 Jul 2020 11:07  Phos  6.1     07-27  Mg     2.3     07-27    TPro  5.4<L>  /  Alb  3.2<L>  /  TBili  0.4  /  DBili  x   /  AST  29  /  ALT  10  /  AlkPhos  56  07-26      Fingerstick  glucose: POCT Blood Glucose.: 128 mg/dL (25 Jul 2020 16:38)        RADIOLOGY & ADDITIONAL TESTS: Reviewed. Hospital Course:  Ms. Salazar is a 75 yo female with a PMHx hemorrhagic CVA 9/2013 with residual left sided weakness and HTN, b/l OA knees, CKD,  BIBEMS for AMS after finding down in her apartment by EMS and her Doorman. In the ED, she was awake, confused, non cooperative, shaky, labs showed leukocytosis, , K 7.8, Cr 9.03, bicarb 17, normal lactate, ARF with anion gap acidosis. ECG peaked T waves. Elevated troponin, UA +. She was admitted for urgent HD medicine telemetry with nephrology. CT suggestive of chronic atrophic kidneys / CKD. She was started on vEEG to r/o Seizure. Keppra 500 daily started for epileptiform activity on EEG. Psych recommended meds and No psychiatric contraindications to discharge. BL LE dopplers US ordered for swelling of LE (Left leg cellulitis to r/u DVT). Vascular surgery consulted and Vanco 1250 started for cellulitis. IR consulted for lymph node biopsy for adenopathy on CT. UCx showed GNR responding to ceftriaxone. TTE to assess cardiac function and HD per renal recs.      SUBJECTIVE:    Patient seen and examined at dialysis.     Denies fever, headache, nausea, vomiting, chest pain, shortness of breath, abdominal pain, changes in bowel movements or urination.      PHYSICAL EXAM:  Vital Signs Last 24 Hrs  T(C): 36.3 (27 Jul 2020 14:30), Max: 37.1 (27 Jul 2020 05:23)  T(F): 97.4 (27 Jul 2020 14:30), Max: 98.7 (27 Jul 2020 05:23)  HR: 67 (27 Jul 2020 14:30) (67 - 102)  BP: 122/64 (27 Jul 2020 14:30) (91/51 - 141/63)  BP(mean): 93 (27 Jul 2020 13:23) (64 - 100)  RR: 18 (27 Jul 2020 14:30) (16 - 20)  SpO2: 97% (27 Jul 2020 14:30) (93% - 100%)    Constitutional: Appears anxious, no acute distress  Neck: Supple; large right supraclavicular soft mass present, non tender   Respiratory: CTA B/L; no wheezes/crackles   Cardiovascular: +S1/S2, irregular rhythm   Gastrointestinal: Soft; NTND; normoactive BS  : Sorenson in place - not draining urine   Extremities: RUE twitching/tremor; bilateral lymphedema, venous statis dermatitis. R groin HD catheter in-place. Left Leg cellulitis, warm, erythema  Neurologic: AAOx 3, Alert, confused, not cooperative with exam      MEDICATIONS  (STANDING):  cefTRIAXone   IVPB 1000 milliGRAM(s) IV Intermittent every 24 hours  heparin   Injectable 5000 Unit(s) SubCutaneous every 8 hours  levETIRAcetam 500 milliGRAM(s) Oral daily  risperiDONE   Tablet 0.5 milliGRAM(s) Oral two times a day    MEDICATIONS  (PRN):  albumin human 25% IVPB 50 milliLiter(s) IV Intermittent every 1 hour PRN to maintain sbp> 100 mmhg intradialysis        Allergies    IV Contrast (Anaphylaxis)  Levaquin (Angioedema)  penicillin (Angioedema)    Intolerances          LABS:                        9.4    11.70 )-----------( 122      ( 27 Jul 2020 11:07 )             31.1     07-27    135  |  98  |  59<H>  ----------------------------<  93  4.8   |  19<L>  |  5.11<H>    Ca    8.1<L>      27 Jul 2020 11:07  Phos  6.1     07-27  Mg     2.3     07-27    TPro  5.4<L>  /  Alb  3.2<L>  /  TBili  0.4  /  DBili  x   /  AST  29  /  ALT  10  /  AlkPhos  56  07-26      Fingerstick  glucose: POCT Blood Glucose.: 128 mg/dL (25 Jul 2020 16:38)        RADIOLOGY & ADDITIONAL TESTS: Reviewed. Hospital Course:  Ms. 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 In the ED, she was awake, confused, non cooperative, shaky, labs showed leukocytosis, , K 7.8, Cr 9.03, bicarb 17, normal lactate, ARF with anion gap acidosis. ECG peaked T waves. Elevated troponin, UA +. She was admitted for urgent HD medicine telemetry with nephrology. CT suggestive of chronic atrophic kidneys / CKD. She was started on vEEG to r/o Seizure. Keppra 500 daily started for epileptiform activity on EEG. Psych recommended meds and No psychiatric contraindications to discharge. BL LE dopplers US ordered for swelling of LE (Left leg cellulitis to r/u DVT). Vascular surgery consulted and Vanco 1250 started for cellulitis. IR consulted for lymph node biopsy for adenopathy on CT. UCx showed GNR responding to ceftriaxone. TTE to assess cardiac function and HD per renal recs.      SUBJECTIVE:    Patient seen and examined at dialysis. Unable to obtain ROS due pt not being able to answer questions.      PHYSICAL EXAM:  Vital Signs Last 24 Hrs  T(C): 36.3 (27 Jul 2020 14:30), Max: 37.1 (27 Jul 2020 05:23)  T(F): 97.4 (27 Jul 2020 14:30), Max: 98.7 (27 Jul 2020 05:23)  HR: 67 (27 Jul 2020 14:30) (67 - 102)  BP: 122/64 (27 Jul 2020 14:30) (91/51 - 141/63)  BP(mean): 93 (27 Jul 2020 13:23) (64 - 100)  RR: 18 (27 Jul 2020 14:30) (16 - 20)  SpO2: 97% (27 Jul 2020 14:30) (93% - 100%)    Constitutional: Appears anxious, no acute distress  Neck: Supple; large right supraclavicular soft mass present, non tender   Respiratory: CTA B/L; no wheezes/crackles   Cardiovascular: +S1/S2, irregular rhythm   Gastrointestinal: Soft; NTND; normoactive BS  : Sorenson in place - not draining urine   Extremities: RUE twitching/tremor; bilateral lymphedema, venous statis dermatitis. R groin HD catheter in-place. Left Leg cellulitis, warm, erythema  Neurologic: AAOx 3, Alert, confused, not cooperative with exam      MEDICATIONS  (STANDING):  cefTRIAXone   IVPB 1000 milliGRAM(s) IV Intermittent every 24 hours  heparin   Injectable 5000 Unit(s) SubCutaneous every 8 hours  levETIRAcetam 500 milliGRAM(s) Oral daily  risperiDONE   Tablet 0.5 milliGRAM(s) Oral two times a day    MEDICATIONS  (PRN):  albumin human 25% IVPB 50 milliLiter(s) IV Intermittent every 1 hour PRN to maintain sbp> 100 mmhg intradialysis        Allergies    IV Contrast (Anaphylaxis)  Levaquin (Angioedema)  penicillin (Angioedema)    Intolerances          LABS:                        9.4    11.70 )-----------( 122      ( 27 Jul 2020 11:07 )             31.1     07-27    135  |  98  |  59<H>  ----------------------------<  93  4.8   |  19<L>  |  5.11<H>    Ca    8.1<L>      27 Jul 2020 11:07  Phos  6.1     07-27  Mg     2.3     07-27    TPro  5.4<L>  /  Alb  3.2<L>  /  TBili  0.4  /  DBili  x   /  AST  29  /  ALT  10  /  AlkPhos  56  07-26      Fingerstick  glucose: POCT Blood Glucose.: 128 mg/dL (25 Jul 2020 16:38)        RADIOLOGY & ADDITIONAL TESTS: Reviewed.

## 2020-07-27 NOTE — PROGRESS NOTE ADULT - PROBLEM SELECTOR PLAN 6
F: Replete as needed  E: Per nephrology recs  N: Renal diet  D: Heparin 5000 q8hr -Nitrite +, LEC +, Bacteria +  -Continue 1 gr ceftriaxone   -f/u urine culture

## 2020-07-27 NOTE — PROGRESS NOTE ADULT - PROBLEM SELECTOR PLAN 6
UA Nitrite +, LEC +, Bacteria +  -c/w ceftriaxone 1g IV q24h   -f/u urine culture UA Nitrite +, LEC +, Bacteria +. Urine Cx grew Klebsiella and E. Coli  -c/w ceftriaxone 1g IV q24h

## 2020-07-27 NOTE — SWALLOW BEDSIDE ASSESSMENT ADULT - SLP PERTINENT HISTORY OF CURRENT PROBLEM
PMHx notable for hemorrhagic CVA 9/2013 with residual left sided weakness and HTN, b/l OA knees, CKD, who was BIBEMS for AMS admitted for toxic metabolic encephalopathy 2/2 acute renal failure in the setting of UTI. She was admitted for urgent HD, medicine telemetr

## 2020-07-27 NOTE — PROGRESS NOTE ADULT - ASSESSMENT
Ms. 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 She was admitted for urgent HD, medicine telemetry. Now s/p HD x1. Ms. 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 She was admitted for urgent HD, medicine telemetry. Now s/p HD x1 Ms. 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 She was admitted for urgent HD, medicine telemetry. Now s/p HD x2 Ms. 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 (Sepsis 2/2 UTI with ARF and encephalopathy) . She was admitted for urgent HD, medicine telemetry. Now s/p HD x2.

## 2020-07-27 NOTE — PROGRESS NOTE ADULT - PROBLEM SELECTOR PLAN 2
YOLIE jerking motion witnessed this afternoon - spontaneously resolved. Unclear if it's new or worsening of tremors.   -VEEG in place, follow up report  -Nephro will dialyze again today incase pt's uremia is causing neurotoxicity YOLIE jerking motion witnessed this afternoon - spontaneously resolved. Unclear if it's new or worsening of tremors.   -VEEG in place, follow up report  - Keppra 500 daily started for epileptiform activity on EEG  -Nephro will dialyze again today incase pt's uremia is causing neurotoxicity

## 2020-07-27 NOTE — BEHAVIORAL HEALTH ASSESSMENT NOTE - NSBHCONSULTRECOMMENDOTHER_PSY_A_CORE FT
1) Level of Observation as per Primary Team  2) Discontinue Risperdal 0.5mg BID  3) May use quetiapine 12.5mg BID prn agitation  4) Psychiatry will follow this admission however please call for any acute psychiatric needs  5) No psychiatric contraindications to discharge,

## 2020-07-27 NOTE — PROGRESS NOTE ADULT - PROBLEM SELECTOR PLAN 1
-Nephro following, s/p HD x1. plan for HD again today (7/26). Vascular following for potential fistula placement if there are no signs of recovery after HD.   -Minimal urine output since admission, Continue monitoring I/O  -Refer to Nephro's note for further recs -Nephro following, s/p HD x 2.   -Vascular following for potential fistula placement if there are no signs of recovery after HD after cellulitis resolved.  -Minimal urine output since admission, Continue monitoring I/O  -Refer to Nephro's note for further recs

## 2020-07-27 NOTE — PROGRESS NOTE ADULT - PROBLEM SELECTOR PLAN 4
Pt presents with AMS likely 2/2 acute renal failure vs cellulitis  -see plan for acute renal failure  -see plan for cellulitis Pt presents with AMS likely 2/2 acute renal failure vs cellulitis vs UTI vs underlying psych disorder  -see plan for acute renal failure  -see plan for cellulitis  -see plan for UTI  -call Psych Pt presents with AMS likely 2/2 acute renal failure vs cellulitis vs UTI vs underlying psych disorder  -see plan for acute renal failure  -see plan for cellulitis  -see plan for UTI  -Psych consulted for AMS. Recommends Seroquel 12.5 g PRN for agitation

## 2020-07-27 NOTE — EEG REPORT - NS EEG TEXT BOX
[facility] Department of Neurology  Inpatient Continuous video-Electroencephalography Report    Patient Name:	PRITESH BAL    :	1945  MRN:	[id number]    Study Start Date/Time:  2020, 2:12:01 PM  Study End Date/Time:	in progress    Referred by: Chery Mason MD    Brief Clinical History:  PRITESH BAL is a 74-year-old woman with sepsis, toxic-metabolic encephalopathy, and an episode of right-arm shaking concerning for seizure.    Diagnosis Code:   R56.9 convulsions/seizure  CPT: 36058 EEG with video 12-26h    Pertinent Medications on Initiation  No antiepileptic drugs    Acquisition Details:  Electroencephalography was acquired using a minimum of 21 channels on an [employer] system v 8.5.1 with electrode placement according to the standard International 10-20 system following ACNS (American Clinical Neurophysiology Society) guidelines for Long-Term Video EEG monitoring.  Anterior temporal T1 and T2 electrodes were utilized whenever possible.   The [employer] automated spike & seizure detections were all reviewed in detail, in addition to extensive portions of raw EEG.    Day 1: 2020 @ 2:12:01 PM to next morning @ 07:00 AM    Background:  continuous, with predominantly sharply contoured  theta/delta frequencies.  Symmetry:  No persistent asymmetries of voltage or frequency.  Posterior Dominant Rhythm:  7 Hz symmetric, well-organized, and well-modulated.  Organization: Rudimentary.  Voltage:  Normal (20+ uV)  Variability: Yes. 		Reactivity: Yes.  N2 sleep: State changes were present without definite N2 sleep transients.  Spontaneous Activity:  Abundant (1+/10s) left posterior quadrant (P3, O1, Pz maximal) spikes and sharp waves.  Periodic/rhythmic activity:  None.  Events:  No electrographic seizures or significant clinical events.  Provocations:  Hyperventilation and Photic stimulation: was not performed.    Daily Summary:    1)	Abundant left posterior quadrant spikes and sharp waves, indicating increased epileptic potential in this region.  2)	Moderate generalized background slowing, often sharply contoured, indicating diffuse or multifocal cerebral dysunction.  3)	No seizures or significant clinical events.    Read by:  Apryl Parks MD

## 2020-07-27 NOTE — PROGRESS NOTE ADULT - SUBJECTIVE AND OBJECTIVE BOX
Patient was seen and evaluated on dialysis.   HR: 67 (07-27-20 @ 14:30)  BP: 122/64 (07-27-20 @ 14:30)  Wt(kg): --  07-27    135  |  98  |  59<H>  ----------------------------<  93  4.8   |  19<L>  |  5.11<H>    Ca    8.1<L>      27 Jul 2020 11:07  Phos  6.1     07-27  Mg     2.3     07-27    TPro  5.4<L>  /  Alb  3.2<L>  /  TBili  0.4  /  DBili  x   /  AST  29  /  ALT  10  /  AlkPhos  56  07-26      Dialyzer: Revaclear 400   QB: 400 mL/min         QD: 500 mL/min      K bath: 2  Goal UF: 0L                Duration: 180 min     Patient is tolerating the procedure well.   Continue full hemodialysis treatment as prescribed.

## 2020-07-28 DIAGNOSIS — N17.9 ACUTE KIDNEY FAILURE, UNSPECIFIED: ICD-10-CM

## 2020-07-28 DIAGNOSIS — I82.412 ACUTE EMBOLISM AND THROMBOSIS OF LEFT FEMORAL VEIN: ICD-10-CM

## 2020-07-28 LAB
ANION GAP SERPL CALC-SCNC: 14 MMOL/L — SIGNIFICANT CHANGE UP (ref 5–17)
APTT BLD: 38.2 SEC — HIGH (ref 27.5–35.5)
APTT BLD: 56.9 SEC — HIGH (ref 27.5–35.5)
BASOPHILS # BLD AUTO: 0.02 K/UL — SIGNIFICANT CHANGE UP (ref 0–0.2)
BASOPHILS NFR BLD AUTO: 0.2 % — SIGNIFICANT CHANGE UP (ref 0–2)
BUN SERPL-MCNC: 36 MG/DL — HIGH (ref 7–23)
CALCIUM SERPL-MCNC: 8.1 MG/DL — LOW (ref 8.4–10.5)
CHLORIDE SERPL-SCNC: 102 MMOL/L — SIGNIFICANT CHANGE UP (ref 96–108)
CO2 SERPL-SCNC: 25 MMOL/L — SIGNIFICANT CHANGE UP (ref 22–31)
CREAT SERPL-MCNC: 3.72 MG/DL — HIGH (ref 0.5–1.3)
EOSINOPHIL # BLD AUTO: 0.28 K/UL — SIGNIFICANT CHANGE UP (ref 0–0.5)
EOSINOPHIL NFR BLD AUTO: 2.2 % — SIGNIFICANT CHANGE UP (ref 0–6)
GLUCOSE SERPL-MCNC: 98 MG/DL — SIGNIFICANT CHANGE UP (ref 70–99)
HCT VFR BLD CALC: 29.8 % — LOW (ref 34.5–45)
HGB BLD-MCNC: 9.4 G/DL — LOW (ref 11.5–15.5)
IMM GRANULOCYTES NFR BLD AUTO: 1.6 % — HIGH (ref 0–1.5)
INR BLD: 1.33 — HIGH (ref 0.88–1.16)
LYMPHOCYTES # BLD AUTO: 1.03 K/UL — SIGNIFICANT CHANGE UP (ref 1–3.3)
LYMPHOCYTES # BLD AUTO: 8.1 % — LOW (ref 13–44)
MAGNESIUM SERPL-MCNC: 2.1 MG/DL — SIGNIFICANT CHANGE UP (ref 1.6–2.6)
MCHC RBC-ENTMCNC: 30 PG — SIGNIFICANT CHANGE UP (ref 27–34)
MCHC RBC-ENTMCNC: 31.5 GM/DL — LOW (ref 32–36)
MCV RBC AUTO: 95.2 FL — SIGNIFICANT CHANGE UP (ref 80–100)
MONOCYTES # BLD AUTO: 0.67 K/UL — SIGNIFICANT CHANGE UP (ref 0–0.9)
MONOCYTES NFR BLD AUTO: 5.3 % — SIGNIFICANT CHANGE UP (ref 2–14)
NEUTROPHILS # BLD AUTO: 10.54 K/UL — HIGH (ref 1.8–7.4)
NEUTROPHILS NFR BLD AUTO: 82.6 % — HIGH (ref 43–77)
NRBC # BLD: 0 /100 WBCS — SIGNIFICANT CHANGE UP (ref 0–0)
PHOSPHATE SERPL-MCNC: 3.9 MG/DL — SIGNIFICANT CHANGE UP (ref 2.5–4.5)
PLATELET # BLD AUTO: 144 K/UL — LOW (ref 150–400)
POTASSIUM SERPL-MCNC: 3.9 MMOL/L — SIGNIFICANT CHANGE UP (ref 3.5–5.3)
POTASSIUM SERPL-SCNC: 3.9 MMOL/L — SIGNIFICANT CHANGE UP (ref 3.5–5.3)
PROTHROM AB SERPL-ACNC: 15.7 SEC — HIGH (ref 10.6–13.6)
RBC # BLD: 3.13 M/UL — LOW (ref 3.8–5.2)
RBC # FLD: 12.7 % — SIGNIFICANT CHANGE UP (ref 10.3–14.5)
SODIUM SERPL-SCNC: 141 MMOL/L — SIGNIFICANT CHANGE UP (ref 135–145)
WBC # BLD: 12.75 K/UL — HIGH (ref 3.8–10.5)
WBC # FLD AUTO: 12.75 K/UL — HIGH (ref 3.8–10.5)

## 2020-07-28 PROCEDURE — 99231 SBSQ HOSP IP/OBS SF/LOW 25: CPT

## 2020-07-28 PROCEDURE — 99232 SBSQ HOSP IP/OBS MODERATE 35: CPT

## 2020-07-28 PROCEDURE — 99233 SBSQ HOSP IP/OBS HIGH 50: CPT | Mod: GC

## 2020-07-28 PROCEDURE — 95720 EEG PHY/QHP EA INCR W/VEEG: CPT

## 2020-07-28 RX ORDER — HEPARIN SODIUM 5000 [USP'U]/ML
INJECTION INTRAVENOUS; SUBCUTANEOUS
Qty: 25000 | Refills: 0 | Status: DISCONTINUED | OUTPATIENT
Start: 2020-07-28 | End: 2020-07-28

## 2020-07-28 RX ADMIN — LEVETIRACETAM 500 MILLIGRAM(S): 250 TABLET, FILM COATED ORAL at 11:31

## 2020-07-28 RX ADMIN — HEPARIN SODIUM 1500 UNIT(S)/HR: 5000 INJECTION INTRAVENOUS; SUBCUTANEOUS at 05:22

## 2020-07-28 NOTE — PROGRESS NOTE ADULT - ASSESSMENT
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 She was admitted for urgent HD, medicine telemetry. Now s/p HD x2 75 yo F w/ a PMHx of CKD, hemorrhagic CVA 9/2013 (residual left sided weakness), HTN, and b/l OA knees admitted for urgent HD for toxic metabolic encephalopathy (K 7.8 and ), c/b LLE cellulitis and UTI (Klebsiella and E. Coli)

## 2020-07-28 NOTE — PROGRESS NOTE ADULT - PROBLEM SELECTOR PLAN 9
F: Replete as needed  E: Per nephrology recs  N: Renal diet  D: Heparin 5000 q8hr F: Replete as needed  E: Per nephrology recs  N: Renal diet

## 2020-07-28 NOTE — PROGRESS NOTE ADULT - ATTENDING COMMENTS
ARF, questionable capacity, refusing HD and treatment for DVT. Ethics consulted, will offer treatment but patient is adamantly refusing. She is able to verbalize she will die if she doesn't get HD or doesn't treat the PE but does not have a true understanding/competence.

## 2020-07-28 NOTE — CONSULT NOTE ADULT - SUBJECTIVE AND OBJECTIVE BOX
73 yo F brought in for AMS, PMH notable for CVA with residual left sided weakness and HTN was recently admitted in [place] after fall and discharged to [facility] and subsequently released home few days ago, her doorman called EMS as he had not seen her for few days, nephrology consulted for labs remarkable for severe kidney injury with Cr 10/ , K 7.8 and Bicarb 17, almost anuric, has minimal UOP which was sent for UA which is grossly active for UTI, pt seen and examined in ED, she is alert and awake but confused, non cooperative with exam, shaky, overloaded with severe b/l LE lymphedema along with cellulitic changes, lungs clear, records obtained form nursing home showing Cr 2.95 from July 4th without electrolyte abnormalities. This hospitalization significant for acute renal failure and  uremia treated by emergent dialysis. Patient now awake and alert refusing all medical interventions. Ethics consultation initiated to assist in the dilemma of a patient without benefit of surrogate still requiring medical intervention who desires to leave against  medical advice.      Prognosis Estimate (survival in days, wks, mos, yrs): guarded									    Patient Decision-Making Capacity:     [X ] Lacks capacity because patient without insight deemed legally incompetent. Determined to be under the auspices of Adult protective services during the course of intake for consult				     Patient Aware of:  Diagnosis:  [ X ] Yes   [  ] No  [  ] Unknown    Prognosis:  [  ] Yes   [  ] No  [ X ] Unknown           Name of medical decision-maker should patient lack capacity:                     Relationship:   				       Role:  [  ] Health Care Agent     [  ] Legal Surrogate   Contact #(s):                (c)                            (h)				       Other ‘stakeholders’:  											      Other Decision-Maker (i.e., HCA or Surrogate) Aware of:  Diagnosis: [  ]Yes   [  ]No      Prognosis:  [  ] Yes    [  ] No     Evidence of Patient’s Preference of Life-Sustaining Treatment (Written or Oral):				               	     Resuscitation status:   DNR:  [  ]Yes   [  ]No      DNI:  [  ]Yes   [  ]No        Discussion (DATE and TIMEFRAME OF DISCUSSION AND WITH WHO) with patient (and/or agent and/or other stakeholders)	  BIOETHICS ANALYSIS:												  	      CONCLUSION: 													     OR  														  RECOMMENDATION:    CASE DISCUSSED WITH MEDICAL ETHICIST	  MORE THAN 50% OF THE TIME OF THIS CONSULTATION WAS SPENT IN COORDINATION OF CARE OF PATIENT										  											                                   [  ] Counseling regarding advanced directives - Dx code Z71.89  [  ] Counseling regarding goals of care - Dx code Z71.89  [  ] Counseling regarding end of life decision making - Dx code Z51.5  [  ] Decisional conflict – Dx Code Z51.5: Encounter for palliative care  [  ] Encounter for evaluation of ability to make decisions – Dx code Z13.6  [  ] Patient incapable of making informed decisions – Dx code Z53.20  [  ] Procedure not carried out because of patient’s decision Z53.29  [  ] Z66 Do not resuscitate  [  ] Z55 Problems related to education and literacy  [  ] Z91.1 Patient’s noncompliance with medical treatment regimen 75 yo F brought in for AMS, PMH notable for CVA with residual left sided weakness and HTN was recently admitted in [place] after fall and discharged to [facility] and subsequently released home few days ago, her doorman called EMS as he had not seen her for few days, nephrology consulted for labs remarkable for severe kidney injury with Cr 10/ , K 7.8 and Bicarb 17, almost anuric, has minimal UOP which was sent for UA which is grossly active for UTI, pt seen and examined in ED, she is alert and awake but confused, non cooperative with exam, shaky, overloaded with severe b/l LE lymphedema along with cellulitic changes, lungs clear, records obtained form nursing home showing Cr 2.95 from July 4th without electrolyte abnormalities. This hospitalization significant for acute renal failure and  uremia treated by emergent dialysis. Patient now awake and alert refusing all medical interventions. Patient seen by psychiatry and deemed without contraindications to discharge and medications optimize. Ethics consultation initiated to assist in the dilemma of a patient without benefit of surrogate still requiring medical intervention who desires to leave against  medical advice.       Prognosis Estimate (survival in days, wks, mos, yrs): guarded									    Patient Decision-Making Capacity:     [X ] Lacks capacity because patient without insight deemed legally incompetent. Determined to be under the auspices of Adult protective services during the course of intake for consult				     Patient Aware of:  Diagnosis:  [ X ] Yes   [  ] No  [  ] Unknown    Prognosis:  [  ] Yes   [  ] No  [ X ] Unknown           Name of medical decision-maker should patient lack capacity:   Laura Hawley          Relationship:   Adult protective Services 				    ;  Role:  [  ] Health Care Agent     [  ] Legal Surrogate   Contact #(s):                (c) [phone]                (h)				  Other ‘stakeholders’: none  											      Other Decision-Maker (i.e., HCA or Surrogate) Aware of:  Diagnosis: [  ]Yes   [  ]No      Prognosis:  [  ] Yes    [  ] No     Evidence of Patient’s Preference of Life-Sustaining Treatment (Written or Oral):				               	     Resuscitation status:   DNR:  [  ]Yes   [X  ]No      DNI:  [  ]Yes   [X  ]No        Discussion : Case discussed with Dr. Aragon. Patient now refusing all treatment and without family. Discussed patient is not a safe discharge. Given history of developing rhabdomyolysis, electrolyte abnormality and past history patient requires case management and adult protective services. During course of intake Dr. Aragon able to confirm and speak with APS .  Patient has history of paranoia, delusions, and poor decision making capacity. Pt has no family and has hx of leaving prior medical facilities AMA.  also mentioned patient had instances of prescription med abuse where she would try to take medications from residents of previous rehab/nursing facilities. Has also caused trouble at prior rehab/nursing facilities. Given this complex history and patient's lack of insight, she does not have capacity to leave AMA. Patient may require benefit of surrogate decision making and continuance of APS  efforts for guardianship. At present patient without emergent needs therefore treatment cannot be coerced or compelled.  												  	      CONCLUSION: 													     	  MORE THAN 50% OF THE TIME OF THIS CONSULTATION WAS SPENT IN COORDINATION OF CARE OF PATIENT										  											                                   [  ] Counseling regarding advanced directives - Dx code Z71.89  [  ] Counseling regarding goals of care - Dx code Z71.89  [  ] Counseling regarding end of life decision making - Dx code Z51.5  [  ] Decisional conflict – Dx Code Z51.5: Encounter for palliative care  [  ] Encounter for evaluation of ability to make decisions – Dx code Z13.6  [  ] Patient incapable of making informed decisions – Dx code Z53.20  [  ] Procedure not carried out because of patient’s decision Z53.29  [  ] Z66 Do not resuscitate  [  ] Z55 Problems related to education and literacy  [  ] Z91.1 Patient’s noncompliance with medical treatment regimen 73 yo F brought in for AMS, PMH notable for CVA with residual left sided weakness and HTN was recently admitted in [place] after fall and discharged to [facility] and subsequently released home few days ago, her doorman called EMS as he had not seen her for few days, nephrology consulted for labs remarkable for severe kidney injury with Cr 10/ , K 7.8 and Bicarb 17, almost anuric, has minimal UOP which was sent for UA which is grossly active for UTI, pt seen and examined in ED, she is alert and awake but confused, non cooperative with exam, shaky, overloaded with severe b/l LE lymphedema along with cellulitic changes, lungs clear, records obtained form nursing home showing Cr 2.95 from July 4th without electrolyte abnormalities. This hospitalization significant for acute renal failure and  uremia treated by emergent dialysis. Patient now awake and alert refusing all medical interventions. Patient seen by psychiatry and deemed without contraindications to discharge and medications optimize. Ethics consultation initiated to assist in the dilemma of a patient without benefit of surrogate still requiring medical intervention who desires to leave against  medical advice.       Prognosis Estimate (survival in days, wks, mos, yrs): guarded									    Patient Decision-Making Capacity:     [X ] Lacks capacity because patient without insight deemed legally incompetent. Determined to be under the auspices of Adult protective services during the course of intake for consult				     Patient Aware of:  Diagnosis:  [ X ] Yes   [  ] No  [  ] Unknown    Prognosis:  [  ] Yes   [  ] No  [ X ] Unknown           Name of medical decision-maker should patient lack capacity:   Laura Hawley          Relationship:   Adult protective Services 				    ;  Role:  [  ] Health Care Agent     [  ] Legal Surrogate   Contact #(s):                (c) [phone]                (h)				  Other ‘stakeholders’: none  											      Other Decision-Maker (i.e., HCA or Surrogate) Aware of:  Diagnosis: [  ]Yes   [  ]No      Prognosis:  [  ] Yes    [  ] No     Evidence of Patient’s Preference of Life-Sustaining Treatment (Written or Oral):				               	     Resuscitation status:   DNR:  [  ]Yes   [X  ]No      DNI:  [  ]Yes   [X  ]No        Discussion : Case discussed with Dr. Aragon. Patient now refusing all treatment and without family. Discussed patient is not a safe discharge. Given history of developing rhabdomyolysis, electrolyte abnormality and past history patient requires case management and adult protective services. During course of intake Dr. Aragon able to confirm and speak with APS .  Patient has history of paranoia, delusions, and poor decision making capacity. Pt has no family and has hx of leaving prior medical facilities AMA.  also mentioned patient had instances of prescription med abuse where she would try to take medications from residents of previous rehab/nursing facilities. Has also caused trouble at prior rehab/nursing facilities. Given this complex history and patient's lack of insight, she does not have capacity to leave AMA. Patient may require benefit of surrogate decision making and continuance of APS  efforts for guardianship. At present patient without emergent needs therefore treatment cannot be coerced or compelled.  												  Bioethics analysis:  This case illustrates the ethical conflict between autonomy and social justice balanced by beneficence  A patient’s autonomy is best preserved through informed consent by the autonomous patient with capacity. In the [place], adults with decision-making capacity (i.e., the ability to understand the consequences of their decisions) have the right to refuse medical therapy, even if the therapy is thought to be life-saving.A medical team may challenge a refusal only if they think that the patient does not understand the therapy, alternatives, or consequences of refusal or is otherwise impaired (eg, suicidal or depressed). In this case, Ms. Salazar lacks decision making capacity at this time to refuse the proposed treatments, which has compromised her ability to exercise her autonomy. Capacity can be evaluated utilizing the pneumonic (C-U-A-R). Communication, understanding, appreciation and reasoning.  Ms. Salazar is able to articulate. She has refused certain treatments at times. As this patient lacks capacity, surrogate-decision making is indicated, and the patient lacks a health care proxy as well as a legal guardian to make decisions for her. Ethical analysis concurs with the clinical team regarding the patient’s lack of capacity. She has limited insight into her medical condition, and lacks reasoning and therefore, capacity for these specific medical decisions. Yet, proportionally we must also weigh the acceptable standard of care.    The patient’s current condition warrants attention to the deep dangelo thrombosis, UTI and acute kidney injury in order to determine appropriate course of treatment. The bioethical issue presently addresses a conflict between autonomy and social justice versus beneficence versus non-maleficence. In regards to the patient’s autonomy and lack of capacity, the Family Health Care Decision Act of 2010 establishes the procedure for making healthcare decisions, other than life sustaining treatment decisions, for adult patients who have lost decision making capacity and have no available family member or friend to act as surrogate and is outlined as such:     • Requires hospitals, after a patient is admitted, to determine if the patient has a health care agent, guardian, or a person who can serve as the patient’s surrogate. If the patient has no such person, and lacks capacity, the hospital must identify, to the extent practical, the patient’s wishes and preferences about pending health care decisions.   • Authorizes the attending physician to decide about routine medical treatment for patients without surrogates.     • For decisions about major medical treatment, the attending physician must consult with other health care professionals directly involved with the patient’s care and a second physician selected by the hospital must concur in the decision.1     Moreover, two physicians may consent for treatment of her DVT with heparin. Electing to withhold treatment may not be ethically prudent in the absence of a definitive diagnosis and in this case must be weighed given the vulnerable nature of this unbefriended vulnerable patient. However, absent Ms. Salazar’s assent she may not be compelled or coerced to treatment unless an emergent condition exists.     While the plan of care in this case, like all patients should be based on the ethical pillars of beneficence & non-maleficence, while respecting the autonomy of the patient and her moral self, social justice is also involved as the patient has behavioral health issues which make a difficult discharge and the medical team should be praised for their virtue in recognizing the hardships and logistical barriers that such a plan may cause to the patient. Critically, these issues have the potential to cause disruptions to the care of these patients which can have adverse effects on their overall prognosis      The physician’s duty of non-maleficence, for this patient’s most probable occult malignancy (DVT in the face of pelvic adenopathy), will require a careful consideration of interventions that appear to neither have little long-term benefit nor restore a functional quality of life for a specific patient and may increase the patient’s suffering or prolong the dying process versus interventions that may palliate. Clinicians may variably interpret the principle of beneficence [“do good”] in this kind of situation, depending on their own values about the commitment to sustain life. Medical decision making for patients with neither decision-making capacity (DMC) nor a surrogate decision-maker presents an ethical challenge for healthcare providers because there is no way to obtain informed consent for treatment. This is particularly so when these decisions involve invasive/life-threatening procedures or withholding or withdrawing life-sustaining treatments and providing appropriate palliative care. In this case, the health team is commended for their virtue and invoking justice – by providing fairness and equitable care to while attempting to locate a surrogate. Here, there is no surrogate to assist the medical team using the shared decision making model to determine the level of care going forward.     Our ethical responsibility towards this patient goes beyond her acute inpatient stay, and extends to the formation of appropriate discharge plans. While these issues are often more logistically challenging than access to resources on an inpatient basis, they are ethically no different. We must balance the same principals of beneficence, non-maleficence, and patient autonomy in planning for the continuity of his care in the community. In view of this principal, ethical standards such as the AMA Code of Ethics have described physician obligations to include being “transparent about alternatives [to the standard of care], including disclosing when resource constraints play a role in decision making.” Such discussions may occur when discharge planning occurs, as lack of  guardian  may potentially alter type or quality of facilities the patient can be discharged to, and occasionally even the ability to be discharged in a timely manner. In this case as the patient lacks anyone with moral agency to speak for her, clinicians are commended for their virtue in advocating for the patient.    The 2010 [facility] Health Care Decisions Act (FHDCA) addresses the situation of a sick individual who lacks capacity and has no available surrogate. As aforementioned, WHERE THE PATIENT CONTINUES TO BE INCAPACITATED THE ATTENDING PHYSICIAN MAY AUTHORIZE MAJOR MEDICAL TREATMENT AFTER CONSULTATION AND CONCURRENCE WITH THE PATIENT’S HEALTH CARE TEAM AND AN INDEPENDENT PHYSICIAN NOT INVOLVED WITH THE PATIENT’S CARE CONCURS WITH THE TREATMENT.  Ms. Salazar also has MORAL STATUS – that is, her distinct personhood, personal experience and interpretation of suffering, life-story, etc. – which must be respected. Therefore, the ethical analysis must consider whether it is ethically reasonable to coerce or utilize restraint in a patient empathetically articulating the refusal of medical intervention.    Coercive treatment might sometimes only seemingly conflict with autonomy considerations, namely when the patient has not autonomous capacity. It is reasonable to believe that most people accept and find comfort in the thought that they will be provided medical care if they become too ill to consent to the care needed, and that it is therefore in line with their autonomous will as healthy individuals. Although this patient is not capable of autonomous choice at this particular moment in time, autonomy effects of available choices must still be considered. The concept of authenticity refers to the deeply set values, aims and interests of a patient. Therefore, treatment considerations should be weighed in light of best interests, benefits, burdens and risk to the patient’s authentic self. Coercive treatment is, thus, acceptable only on the condition that:     (1)	The patient cannot make an autonomous decision about treatment and  (2)	Treatment is in the patient’s authentic interest.     Coercive treatment cannot be defended when the patient makes an autonomous decision against treatment or when treatment is not in the patient’s genuine interest. In this case, proportionality suggests that the emergent treatments have occurred and currently the team must weigh treatments for her DVT.       CONCLUSION:  In this case, the patient requires continued medical management. At present she is unsafe for discharge and requires adult protective services upon discharge with possible guardianship.    RECOMMENDATION:  WHERE THE PATIENT CONTINUES TO BE INCAPACITATED AN ATTENDING PHYSICIAN MAY AUTHORIZE MAJOR MEDICAL TREATMENT AFTER CONSULTATION AND CONCURRENCE WITH THE PATIENT’S HEALTH CARE TEAM AND AN INDEPENDENT PHYSICIAN NOT INVOLVED WITH THE PATIENT’S CARE CONCURS WITH THE TREAMENT.     Ethics concurs with the team, that the patient lacks ability to leave against medical advice. Although there are no psychiatric contraindications to discharge the patient lacks insight into her social issues. Ethics will remain available to utilize gentle persuasion for medical management of her DVT. Suggest enlisting social work to assist with placement. The potential occult malignancy and poor housing status suggest patient would benefit from a mental health guardian which most likely should occur outpatient in conjunction with APS.    Follow up Consultation will continue. Thank you for this complex case      More than 50% of the time of this consultation was spent in coordination of Care of Patient  				        										  											                                     [X  ] Decisional conflict – Dx Code Z51.5: Encounter for palliative care  [ X ] Encounter for evaluation of ability to make decisions – Dx code Z13.6  [X  ] Patient incapable of making informed decisions – Dx code Z53.20  [ X ] Procedure not carried out because of patient’s decision Z53.29    [ X ] Z91.1 Patient’s noncompliance with medical treatment regimen

## 2020-07-28 NOTE — PROGRESS NOTE ADULT - ASSESSMENT
74F admitted for altered mental status w/concomitant acute renal injury associated with hyperkalemia and uremic encephalopathy and UTI     #anuric KATHARINA on advanced CKD   limited collateral information re baseline kidney function, patient states she has never been on dialysis previously   given limited collateral, uncertain etiology of KATHARINA   CT suggestive of chronic atrophic kidneys / CKD   differential includes ATN ischemic vs toxic, less likely pre-renal or post-renal as history and clinical picture not suggestive and pt remains anuric w/hx of CKD (Cr 2.9 on 07/04/2020) and atrophic kidneys   recovery possible but given advanced kidney disease, likely dialysis dependent   - next hemodialysis 7/29  - s/p HD 7/27 for clearance and further optimization of mental status with concerns for possible renally cleared neurotoxic agents   - hemodialysis w/Revaclear to reduce variables contributing to thrombocytopenia, trend platelet count   - continue with strict IOs   - EDW 89.8 kg   - pending iron panel, PTH, 25 and 1-25 Vit D level  - continue with phoslo 667 mg TID w/meals 74F admitted for altered mental status w/concomitant acute renal injury associated with hyperkalemia and uremic encephalopathy and UTI     #anuric KATHARINA on advanced CKD   limited collateral information re baseline kidney function, patient states she has never been on dialysis previously   given limited collateral, uncertain etiology of KATHARINA   CT suggestive of chronic atrophic kidneys / CKD   differential includes ATN ischemic vs toxic, less likely pre-renal or post-renal as history and clinical picture not suggestive and pt remains anuric w/hx of CKD (Cr 2.9 on 07/04/2020) and atrophic kidneys   recovery possible but given advanced kidney disease, likely dialysis dependent   - s/p HD 7/27 for clearance and further optimization of mental status with concerns for possible renally cleared neurotoxic agents   - next hemodialysis 7/29 w/Revaclear to reduce variables contributing to thrombocytopenia, trend platelet count   - R femoral catheter placed 7/25/2020   - recommend IR consult for PermCath   - continue with strict IOs   - EDW 89.8 kg   - pending iron panel, PTH, 25 and 1-25 Vit D level  - continue with phoslo 667 mg TID w/meals 74F admitted for altered mental status w/concomitant acute renal injury associated with hyperkalemia and uremic encephalopathy and UTI     #anuric KATHARINA on advanced CKD   limited collateral information re baseline kidney function, patient states she has never been on dialysis previously   given limited collateral, uncertain etiology of KATHARINA   CT suggestive of chronic atrophic kidneys / CKD   differential includes ATN ischemic vs toxic, less likely pre-renal or post-renal as history and clinical picture not suggestive and pt remains anuric w/hx of CKD (Cr 2.9 on 07/04/2020) and atrophic kidneys   recovery possible but given advanced kidney disease, likely dialysis dependent   - s/p HD 7/27 for clearance and further optimization of mental status with concerns for possible renally cleared neurotoxic agents   - next hemodialysis 7/29 w/Revaclear to reduce variables contributing to thrombocytopenia, trend platelet count   - R femoral catheter placed 7/25/2020   - recommend IR consult for PermCath   - consider Psych eval for capacity if patient continues to refused dialysis access   - continue with strict IOs   - EDW 89.8 kg   - pending iron panel, PTH, 25 and 1-25 Vit D level  - continue with phoslo 667 mg TID w/meals

## 2020-07-28 NOTE — PROGRESS NOTE ADULT - PROBLEM SELECTOR PLAN 6
UA Nitrite +, LEC +, Bacteria +. Urine Cx grew Klebsiella and E. Coli  -c/w ceftriaxone 1g IV q24h K 7.8 in ED, wnl after HD. Likely 2/2 acute renal failure  -Continue to monitor Pt presented with AMS likely 2/2 uremic encephalopathy d/t acute renal failure vs sepsis 2/2 UTI or cellulitis vs underlying psych disorder  -see plan for acute renal failure  -see plan for cellulitis  -see plan for UTI  -Psych consulted for AMS. Recommends Seroquel 12.5 g PRN for agitation

## 2020-07-28 NOTE — CHART NOTE - NSCHARTNOTEFT_GEN_A_CORE
Patient currently refusing all medical treatment, despite explanation of risks.     Spoke with pt's emergency contact Laura Hawley ([phone]; adult protective services ). States patient comes across as competent, but has history of paranoia, delusions, and poor decision making capacity. Pt has no family and has hx of leaving prior medical facilities AMA.  also mentioned patient had instances of prescription med abuse where she would try to take medications from residents of previous rehab/nursing facilities. Has also caused trouble at prior rehab/nursing facilities.    Contacted medical ethics who said patient is deemed unsafe to leave AMA and patient does not have decision making capacity. Unable to provide treatment if patient refuses, but able to treat if situation becomes emergent/life-threatening. In process of obtaining legal order to treat.

## 2020-07-28 NOTE — PROGRESS NOTE ADULT - PROBLEM SELECTOR PLAN 1
Nephro following, s/p HD x 2. Minimal urine output since admission, Continue monitoring I/O  -Nephro consulted, appreciate reccs  -f/u Nephro  -Vascular following for potential fistula placement if there are no signs of recovery after HD after cellulitis resolved. US [facility] venous lower extremity 7/27 showed extensive LLE DVT from common femoral vein to posterior tibial vein.   -pt refusing Heparin ggt. Pt deemed to have capacity  -f/u PT/PTT US [facility] venous lower extremity 7/27 showed extensive LLE DVT from common femoral vein to posterior tibial vein.   -offered Heparin ggt but pt refused  -consulting medical ethics  -f/u PT/PTT

## 2020-07-28 NOTE — PROGRESS NOTE ADULT - PROBLEM SELECTOR PLAN 3
Left leg warm, erythematous, non purulent, and swollen.   -f/u LE US doppler   -Vascular seen. f/u with Vascular after US doppler report  -c/w ceftriaxone 1g IV q24h, as she is already being treated for UTI  -received Vanc once. Consider continuing RUE jerking motion witnessed this afternoon. Unclear if it's new or worsening of tremors.   -vEEG in place, follow up report  - Keppra 500 daily started for epileptiform activity on EEG  -Nephro will dialyze again today incase pt's uremia is causing neurotoxicity Urine Cx grew Klebsiella and E. Coli  -started ceftriaxone 1g IV q24 on 7/26, but pt started refusing on 7/28  -consulting medical ethics

## 2020-07-28 NOTE — PROGRESS NOTE ADULT - PROBLEM SELECTOR PLAN 4
Pt presents with AMS likely 2/2 acute renal failure vs cellulitis vs UTI vs underlying psych disorder  -see plan for acute renal failure  -see plan for cellulitis  -see plan for UTI  -Psych consulted for AMS. Recommends Seroquel 12.5 g PRN for agitation Left leg warm, erythematous, non purulent, and swollen.   -f/u LE US doppler   -Vascular seen. f/u with Vascular after US doppler report  -c/w ceftriaxone 1g IV q24h, as she is already being treated for UTI  -received Vanc once. Consider continuing Anuric KATHARINA on advanced CKD. Uncertain etiology of KATHARINA given limited collateral. CT suggests chronic atrophic kidneys likely d/t CKD. S/p HD x3.  -Nephro consulted, appreciate recs  -R. feboral cath placed 7/25/20  -next HD scheduled 7/29  -f/u collateral from recent hospitalization at [place]  -f/u IR consult for possible PermCath   -c/w phoslo 667 mg TID w/meals

## 2020-07-28 NOTE — PROGRESS NOTE ADULT - PROBLEM SELECTOR PLAN 5
K 7.8 in ED, wnl after HD. Likely 2/2 acute renal failure  -Continue to monitor Pt presents with AMS likely 2/2 acute renal failure vs cellulitis vs UTI vs underlying psych disorder  -see plan for acute renal failure  -see plan for cellulitis  -see plan for UTI  -Psych consulted for AMS. Recommends Seroquel 12.5 g PRN for agitation RUE jerking motion witnessed this afternoon. Unclear if it's new or worsening of tremors.   -vEEG showed no seizure activity, but showed increased epileptic potential in the posterior quadrant and diffuse/multifocal cerebral dysfunction.  - Keppra 500 daily started for epileptiform activity on EEG

## 2020-07-28 NOTE — PROGRESS NOTE ADULT - PROBLEM SELECTOR PLAN 8
F: Replete as needed  E: Per nephrology recs  N: Renal diet  D: Heparin 5000 q8hr Incidental discovery of pelvic lymphadenopathy on CTAP.   -IR consulted for biopsy. Recommend follow up as outpatient for non emergent lymph node biopsy (IR scheduling office [phone]).

## 2020-07-28 NOTE — PROGRESS NOTE ADULT - SUBJECTIVE AND OBJECTIVE BOX
Patient is a 74y Female seen and evaluated at bedside. Anuric. Asking for water again but NPO. BP acceptable Tolerated hemodialysis yesterday.       Meds:    albumin human 25% IVPB 50 every 1 hour PRN  cefTRIAXone   IVPB 1000 every 24 hours  heparin  Infusion.  <Continuous>  levETIRAcetam 500 daily  QUEtiapine 12.5 every 12 hours PRN      T(C): , Max: 37.4 (07-28-20 @ 05:59)  T(F): , Max: 99.3 (07-28-20 @ 05:59)  HR: 91 (07-28-20 @ 05:59)  BP: 120/74 (07-28-20 @ 05:59)  BP(mean): 93 (07-27-20 @ 13:23)  RR: 18 (07-28-20 @ 05:59)  SpO2: 96% (07-28-20 @ 05:59)  Wt(kg): --    07-27 @ 07:01  -  07-28 @ 07:00  --------------------------------------------------------  IN: 250 mL / OUT: 300 mL / NET: -50 mL        Review of Systems:  RESPIRATORY: No shortness of breath  CARDIOVASCULAR: No chest pain or shortness of breath  GASTROINTESTINAL: No abdominal pain or vomiting   MUSCULOSKELETAL: No leg edema      PHYSICAL EXAM:  GENERAL: no acute distress at present  CHEST/LUNG: Clear to auscultation bilaterally  HEART: normal S1S2, RRR  ABDOMEN: Soft, Nontender   EXTREMITIES: No edema   NEUROLOGY: A&O x2, no focal neurological deficit  ACCESS: R femoral dialysis catheter       LABS:                        9.4    12.75 )-----------( 144      ( 28 Jul 2020 06:17 )             29.8     07-28    141  |  102  |  36<H>  ----------------------------<  98  3.9   |  25  |  3.72<H>    Ca    8.1<L>      28 Jul 2020 06:17  Phos  3.9     07-28  Mg     2.1     07-28      Uric Acid, Serum: 6.5 mg/dL [2.5 - 7.0] (07-27 @ 11:07)    PTT - ( 28 Jul 2020 02:12 )  PTT:38.2 sec          RADIOLOGY & ADDITIONAL STUDIES:

## 2020-07-28 NOTE — PROGRESS NOTE ADULT - PROBLEM SELECTOR PLAN 2
RUE jerking motion witnessed this afternoon. Unclear if it's new or worsening of tremors.   -vEEG in place, follow up report  - Keppra 500 daily started for epileptiform activity on EEG  -Nephro will dialyze again today incase pt's uremia is causing neurotoxicity Nephro following, s/p HD x 2. Minimal urine output since admission, Continue monitoring I/O  -Nephro consulted, appreciate reccs  -f/u Nephro  -Vascular following for potential fistula placement if there are no signs of recovery after HD after cellulitis resolved. LLE warm, erythematous, non purulent, and swollen.   -started ceftriaxone 1g IV q24 on 7/26, but pt started refusing on 7/28  -consulting medical ethics

## 2020-07-28 NOTE — PROGRESS NOTE ADULT - ATTENDING COMMENTS
tolerated dialysis adequately yesterday  still only oriented to person  EEG in place  suspect need for ongoing dialysis  c/w femoral catheter for HD tomorrow, 7/29- prefer tunneled HD catheter by end of week-   if unable to secure consent, will leave FVC in place through HD this week  will review logistics with med team

## 2020-07-28 NOTE — CONSULT NOTE ADULT - ASSESSMENT
In this case, the patient requires continued medical management. At present she is unsafe for discharge and requires adult protective services upon discharge with possible guardianship.    RECOMMENDATION:  WHERE THE PATIENT CONTINUES TO BE INCAPACITATED AN ATTENDING PHYSICIAN MAY AUTHORIZE MAJOR MEDICAL TREATMENT AFTER CONSULTATION AND CONCURRENCE WITH THE PATIENT’S HEALTH CARE TEAM AND AN INDEPENDENT PHYSICIAN NOT INVOLVED WITH THE PATIENT’S CARE CONCURS WITH THE TREAMENT.     Ethics concurs with the team, that the patient lacks ability to leave against medical advice. Although there are no psychiatric contraindications to discharge the patient lacks insight into her social issues. Ethics will remain available to utilize gentle persuasion for medical management of her DVT. Suggest enlisting social work to assist with placement. The potential occult malignancy and poor housing status suggest patient would benefit from a mental health guardian which most likely should occur outpatient in conjunction with APS.

## 2020-07-28 NOTE — PROGRESS NOTE ADULT - PROBLEM SELECTOR PLAN 7
Incidental discovery of pelvic lymphadenopathy on CTAP.   -IR consulted for biopsy. Recommend follow up as outpatient for non emergent lymph node biopsy (IR scheduling office [phone]). UA Nitrite +, LEC +, Bacteria +. Urine Cx grew Klebsiella and E. Coli  -c/w ceftriaxone 1g IV q24h K 7.8 in ED, wnl after HD. Likely 2/2 acute renal failure  -Continue to monitor

## 2020-07-28 NOTE — CONSULT NOTE ADULT - CONSULT REASON
To assist in the ethical dilemma of a 74 year old female without benefit of surrogate refusing all medical intervention

## 2020-07-28 NOTE — EEG REPORT - NS EEG TEXT BOX
Day 2  7/27/2020 @ 7 AM to 7/28/2020 @ 7 AM.  EEG was disconnected between 2:09 PM and 6:57 PM.    Pertinent medications: Keppra 500 mg daily    Background:  continuous, with predominantly sharply contoured  theta/delta frequencies.  Symmetry:  No persistent asymmetries of voltage or frequency.  Posterior Dominant Rhythm:  7 Hz symmetric, well-organized, and well-modulated.  Organization: Rudimentary.  Voltage:  Normal (20+ uV)  Variability: Yes. 		Reactivity: Yes.  N2 sleep: State changes were present without definite N2 sleep transients.  Spontaneous Activity:  Frequent (1+/min < 1/10s) left posterior quadrant (P3, O1, Pz maximal) spikes and sharp waves.  Periodic/rhythmic activity:  None.  Events:  No electrographic seizures or significant clinical events.  Provocations:  Hyperventilation and Photic stimulation: was not performed.    Daily Summary:    1)	Frequent left posterior quadrant spikes and sharp waves, indicating increased epileptic potential in this region, less frequent than in the prior recording.  2)	Moderate generalized background slowing, often sharply contoured, indicating diffuse or multifocal cerebral dysunction.  3)	No seizures or significant clinical events.    Read by:  Apryl Parks MD

## 2020-07-28 NOTE — PROGRESS NOTE ADULT - PROBLEM SELECTOR PROBLEM 1
Acute renal failure (ARF) Deep vein thrombosis (DVT) of femoral vein of left lower extremity, unspecified chronicity

## 2020-07-28 NOTE — PROGRESS NOTE ADULT - SUBJECTIVE AND OBJECTIVE BOX
OVERNIGHT EVENTS:   Extensive LLE DVT from common femoral V. to posterior tibial V. found. PT/PTT drawn at 2am. Started Heparin drip at 5am. Next PT/PTT scheduled at 11am    SUBJECTIVE:    Patient seen and examined at bedside. Pt states she does not remember what happened prior to coming to the hospital. Unclear if pt is reliable historian.      PHYSICAL EXAM:  Vital Signs Last 24 Hrs  T(C): 37.4 (28 Jul 2020 05:59), Max: 37.4 (28 Jul 2020 05:59)  T(F): 99.3 (28 Jul 2020 05:59), Max: 99.3 (28 Jul 2020 05:59)  HR: 91 (28 Jul 2020 05:59) (67 - 100)  BP: 120/74 (28 Jul 2020 05:59) (120/74 - 140/83)  BP(mean): 93 (27 Jul 2020 13:23) (93 - 93)  RR: 18 (28 Jul 2020 05:59) (16 - 18)  SpO2: 96% (28 Jul 2020 05:59) (94% - 98%)    Constitutional: Elderly woman laying in bed, alert, interactive  Neck: Supple; large right supraclavicular soft mass present, non tender   Respiratory: CTA B/L; no wheezes/crackles   Cardiovascular: +S1/S2, irregular rhythm   Gastrointestinal: Soft; NTND; normoactive BS  Extremities: RUE twitching/tremor; bilateral lymphedema, venous statis dermatitis. Left Leg cellulitis, warm, erythema  Neurologic: AAOx 3, Alert, not cooperative with exam      MEDICATIONS  (STANDING):  cefTRIAXone   IVPB 1000 milliGRAM(s) IV Intermittent every 24 hours  heparin  Infusion.  Unit(s)/Hr (15 mL/Hr) IV Continuous <Continuous>  levETIRAcetam 500 milliGRAM(s) Oral daily    MEDICATIONS  (PRN):  albumin human 25% IVPB 50 milliLiter(s) IV Intermittent every 1 hour PRN to maintain sbp> 100 mmhg intradialysis  QUEtiapine 12.5 milliGRAM(s) Oral every 12 hours PRN Agitation        Allergies    IV Contrast (Anaphylaxis)  Levaquin (Angioedema)  penicillin (Angioedema)    Intolerances          LABS:                        9.4    12.75 )-----------( 144      ( 28 Jul 2020 06:17 )             29.8     07-28    141  |  102  |  36<H>  ----------------------------<  98  3.9   |  25  |  3.72<H>    Ca    8.1<L>      28 Jul 2020 06:17  Phos  3.9     07-28  Mg     2.1     07-28      PTT - ( 28 Jul 2020 02:12 )  PTT:38.2 sec  Fingerstick  glucose:       RADIOLOGY & ADDITIONAL TESTS: Reviewed. OVERNIGHT EVENTS:   Extensive LLE DVT from common femoral V. to posterior tibial V. found. PT/PTT drawn at 2am. Started Heparin drip at 5am. Next PT/PTT scheduled at 11am.    SUBJECTIVE:    Patient seen and examined at bedside. Pt states she does not remember what happened prior to coming to the hospital. Unclear if pt is reliable historian.      PHYSICAL EXAM:  Vital Signs Last 24 Hrs  T(C): 37.4 (28 Jul 2020 05:59), Max: 37.4 (28 Jul 2020 05:59)  T(F): 99.3 (28 Jul 2020 05:59), Max: 99.3 (28 Jul 2020 05:59)  HR: 91 (28 Jul 2020 05:59) (67 - 100)  BP: 120/74 (28 Jul 2020 05:59) (120/74 - 140/83)  BP(mean): 93 (27 Jul 2020 13:23) (93 - 93)  RR: 18 (28 Jul 2020 05:59) (16 - 18)  SpO2: 96% (28 Jul 2020 05:59) (94% - 98%)    Constitutional: Elderly woman laying in bed, alert, interactive  Neck: Supple; large right supraclavicular soft mass present, non tender   Respiratory: CTA B/L; no wheezes/crackles   Cardiovascular: +S1/S2, irregular rhythm   Gastrointestinal: Soft; NTND; normoactive BS  Extremities: RUE twitching/tremor; bilateral lymphedema, venous statis dermatitis. Left Leg cellulitis, warm, erythema  Neurologic: AAOx 3, Alert, not cooperative with exam      MEDICATIONS  (STANDING):  cefTRIAXone   IVPB 1000 milliGRAM(s) IV Intermittent every 24 hours  heparin  Infusion.  Unit(s)/Hr (15 mL/Hr) IV Continuous <Continuous>  levETIRAcetam 500 milliGRAM(s) Oral daily    MEDICATIONS  (PRN):  albumin human 25% IVPB 50 milliLiter(s) IV Intermittent every 1 hour PRN to maintain sbp> 100 mmhg intradialysis  QUEtiapine 12.5 milliGRAM(s) Oral every 12 hours PRN Agitation        Allergies    IV Contrast (Anaphylaxis)  Levaquin (Angioedema)  penicillin (Angioedema)    Intolerances          LABS:                        9.4    12.75 )-----------( 144      ( 28 Jul 2020 06:17 )             29.8     07-28    141  |  102  |  36<H>  ----------------------------<  98  3.9   |  25  |  3.72<H>    Ca    8.1<L>      28 Jul 2020 06:17  Phos  3.9     07-28  Mg     2.1     07-28      PTT - ( 28 Jul 2020 02:12 )  PTT:38.2 sec  Fingerstick  glucose:       RADIOLOGY & ADDITIONAL TESTS: Reviewed.

## 2020-07-29 DIAGNOSIS — Z02.79 ENCOUNTER FOR ISSUE OF OTHER MEDICAL CERTIFICATE: ICD-10-CM

## 2020-07-29 LAB
-  CEFAZOLIN: SIGNIFICANT CHANGE UP
-  CEFAZOLIN: SIGNIFICANT CHANGE UP
-  CLINDAMYCIN: SIGNIFICANT CHANGE UP
-  CLINDAMYCIN: SIGNIFICANT CHANGE UP
-  ERYTHROMYCIN: SIGNIFICANT CHANGE UP
-  ERYTHROMYCIN: SIGNIFICANT CHANGE UP
-  LINEZOLID: SIGNIFICANT CHANGE UP
-  LINEZOLID: SIGNIFICANT CHANGE UP
-  OXACILLIN: SIGNIFICANT CHANGE UP
-  OXACILLIN: SIGNIFICANT CHANGE UP
-  RIFAMPIN: SIGNIFICANT CHANGE UP
-  RIFAMPIN: SIGNIFICANT CHANGE UP
-  TRIMETHOPRIM/SULFAMETHOXAZOLE: SIGNIFICANT CHANGE UP
-  TRIMETHOPRIM/SULFAMETHOXAZOLE: SIGNIFICANT CHANGE UP
-  VANCOMYCIN: SIGNIFICANT CHANGE UP
-  VANCOMYCIN: SIGNIFICANT CHANGE UP
ANION GAP SERPL CALC-SCNC: 15 MMOL/L — SIGNIFICANT CHANGE UP (ref 5–17)
APTT BLD: 23.4 SEC — LOW (ref 27.5–35.5)
BASOPHILS # BLD AUTO: 0.02 K/UL — SIGNIFICANT CHANGE UP (ref 0–0.2)
BASOPHILS NFR BLD AUTO: 0.1 % — SIGNIFICANT CHANGE UP (ref 0–2)
BUN SERPL-MCNC: 51 MG/DL — HIGH (ref 7–23)
CALCIUM SERPL-MCNC: 8.3 MG/DL — LOW (ref 8.4–10.5)
CHLORIDE SERPL-SCNC: 93 MMOL/L — LOW (ref 96–108)
CO2 SERPL-SCNC: 24 MMOL/L — SIGNIFICANT CHANGE UP (ref 22–31)
CREAT SERPL-MCNC: 4.7 MG/DL — HIGH (ref 0.5–1.3)
CRP SERPL-MCNC: 13.45 MG/DL — HIGH (ref 0–0.4)
EOSINOPHIL # BLD AUTO: 0.57 K/UL — HIGH (ref 0–0.5)
EOSINOPHIL NFR BLD AUTO: 4.3 % — SIGNIFICANT CHANGE UP (ref 0–6)
GLUCOSE SERPL-MCNC: 132 MG/DL — HIGH (ref 70–99)
HCT VFR BLD CALC: 30.8 % — LOW (ref 34.5–45)
HGB BLD-MCNC: 9.6 G/DL — LOW (ref 11.5–15.5)
IMM GRANULOCYTES NFR BLD AUTO: 1.7 % — HIGH (ref 0–1.5)
INR BLD: 1.2 — HIGH (ref 0.88–1.16)
LYMPHOCYTES # BLD AUTO: 0.91 K/UL — LOW (ref 1–3.3)
LYMPHOCYTES # BLD AUTO: 6.8 % — LOW (ref 13–44)
MAGNESIUM SERPL-MCNC: 2 MG/DL — SIGNIFICANT CHANGE UP (ref 1.6–2.6)
MCHC RBC-ENTMCNC: 30 PG — SIGNIFICANT CHANGE UP (ref 27–34)
MCHC RBC-ENTMCNC: 31.2 GM/DL — LOW (ref 32–36)
MCV RBC AUTO: 96.3 FL — SIGNIFICANT CHANGE UP (ref 80–100)
METHOD TYPE: SIGNIFICANT CHANGE UP
METHOD TYPE: SIGNIFICANT CHANGE UP
MONOCYTES # BLD AUTO: 0.47 K/UL — SIGNIFICANT CHANGE UP (ref 0–0.9)
MONOCYTES NFR BLD AUTO: 3.5 % — SIGNIFICANT CHANGE UP (ref 2–14)
NEUTROPHILS # BLD AUTO: 11.16 K/UL — HIGH (ref 1.8–7.4)
NEUTROPHILS NFR BLD AUTO: 83.6 % — HIGH (ref 43–77)
NRBC # BLD: 0 /100 WBCS — SIGNIFICANT CHANGE UP (ref 0–0)
PHOSPHATE SERPL-MCNC: 4.7 MG/DL — HIGH (ref 2.5–4.5)
PLATELET # BLD AUTO: 152 K/UL — SIGNIFICANT CHANGE UP (ref 150–400)
POTASSIUM SERPL-MCNC: 4.6 MMOL/L — SIGNIFICANT CHANGE UP (ref 3.5–5.3)
POTASSIUM SERPL-SCNC: 4.6 MMOL/L — SIGNIFICANT CHANGE UP (ref 3.5–5.3)
PROCALCITONIN SERPL-MCNC: 2.61 NG/ML — HIGH (ref 0.02–0.1)
PROTHROM AB SERPL-ACNC: 14.3 SEC — HIGH (ref 10.6–13.6)
RBC # BLD: 3.2 M/UL — LOW (ref 3.8–5.2)
RBC # FLD: 12.5 % — SIGNIFICANT CHANGE UP (ref 10.3–14.5)
SODIUM SERPL-SCNC: 132 MMOL/L — LOW (ref 135–145)
WBC # BLD: 13.36 K/UL — HIGH (ref 3.8–10.5)
WBC # FLD AUTO: 13.36 K/UL — HIGH (ref 3.8–10.5)

## 2020-07-29 PROCEDURE — 99233 SBSQ HOSP IP/OBS HIGH 50: CPT | Mod: GC

## 2020-07-29 PROCEDURE — 99232 SBSQ HOSP IP/OBS MODERATE 35: CPT

## 2020-07-29 RX ORDER — HEPARIN SODIUM 5000 [USP'U]/ML
6500 INJECTION INTRAVENOUS; SUBCUTANEOUS EVERY 6 HOURS
Refills: 0 | Status: DISCONTINUED | OUTPATIENT
Start: 2020-07-29 | End: 2020-07-29

## 2020-07-29 RX ORDER — HEPARIN SODIUM 5000 [USP'U]/ML
INJECTION INTRAVENOUS; SUBCUTANEOUS
Qty: 25000 | Refills: 0 | Status: DISCONTINUED | OUTPATIENT
Start: 2020-07-29 | End: 2020-07-29

## 2020-07-29 RX ORDER — HEPARIN SODIUM 5000 [USP'U]/ML
3000 INJECTION INTRAVENOUS; SUBCUTANEOUS EVERY 6 HOURS
Refills: 0 | Status: DISCONTINUED | OUTPATIENT
Start: 2020-07-29 | End: 2020-07-29

## 2020-07-29 RX ORDER — HEPARIN SODIUM 5000 [USP'U]/ML
1500 INJECTION INTRAVENOUS; SUBCUTANEOUS
Qty: 25000 | Refills: 0 | Status: DISCONTINUED | OUTPATIENT
Start: 2020-07-29 | End: 2020-08-03

## 2020-07-29 RX ORDER — HEPARIN SODIUM 5000 [USP'U]/ML
6500 INJECTION INTRAVENOUS; SUBCUTANEOUS ONCE
Refills: 0 | Status: DISCONTINUED | OUTPATIENT
Start: 2020-07-29 | End: 2020-07-29

## 2020-07-29 NOTE — PROGRESS NOTE ADULT - PROBLEM SELECTOR PLAN 5
RUE jerking motion witnessed this afternoon. Unclear if it's new or worsening of tremors.   -vEEG showed no seizure activity, but showed increased epileptic potential in the posterior quadrant and diffuse/multifocal cerebral dysfunction.  - Keppra 500 daily started for epileptiform activity on EEG RUE jerking motion witnessed this afternoon. Unclear if it's new or worsening of tremors.   -vEEG showed no seizure activity, but showed increased epileptic potential in the posterior quadrant and diffuse/multifocal cerebral dysfunction.  -jahaira/c Keppra

## 2020-07-29 NOTE — PROGRESS NOTE ADULT - SUBJECTIVE AND OBJECTIVE BOX
OVERNIGHT EVENTS:       SUBJECTIVE:        PHYSICAL EXAM:  Vital Signs Last 24 Hrs  T(C): 37 (29 Jul 2020 05:45), Max: 37 (28 Jul 2020 12:44)  T(F): 98.6 (29 Jul 2020 05:45), Max: 98.6 (28 Jul 2020 12:44)  HR: 80 (29 Jul 2020 05:45) (80 - 92)  BP: 123/84 (29 Jul 2020 05:45) (114/65 - 131/75)  BP(mean): --  RR: 18 (29 Jul 2020 05:45) (18 - 20)  SpO2: 96% (29 Jul 2020 05:45) (95% - 96%)        MEDICATIONS  (STANDING):  cefTRIAXone   IVPB 1000 milliGRAM(s) IV Intermittent every 24 hours  levETIRAcetam 500 milliGRAM(s) Oral daily    MEDICATIONS  (PRN):  albumin human 25% IVPB 50 milliLiter(s) IV Intermittent every 1 hour PRN to maintain sbp> 100 mmhg intradialysis  QUEtiapine 12.5 milliGRAM(s) Oral every 12 hours PRN Agitation        Allergies    IV Contrast (Anaphylaxis)  Levaquin (Angioedema)  penicillin (Angioedema)    Intolerances          LABS:                        9.4    12.75 )-----------( 144      ( 28 Jul 2020 06:17 )             29.8     07-28    141  |  102  |  36<H>  ----------------------------<  98  3.9   |  25  |  3.72<H>    Ca    8.1<L>      28 Jul 2020 06:17  Phos  3.9     07-28  Mg     2.1     07-28      PT/INR - ( 28 Jul 2020 12:21 )   PT: 15.7 sec;   INR: 1.33          PTT - ( 28 Jul 2020 12:21 )  PTT:56.9 sec  Fingerstick  glucose:       RADIOLOGY & ADDITIONAL TESTS: Reviewed. OVERNIGHT EVENTS:   No acute events overnight    SUBJECTIVE:    Pt seen and examined at bedside. States she is willing to resume medical treatment for her DVT, UTI, and cellulitis.     PHYSICAL EXAM:  Vital Signs Last 24 Hrs  T(C): 37 (29 Jul 2020 05:45), Max: 37 (28 Jul 2020 12:44)  T(F): 98.6 (29 Jul 2020 05:45), Max: 98.6 (28 Jul 2020 12:44)  HR: 80 (29 Jul 2020 05:45) (80 - 92)  BP: 123/84 (29 Jul 2020 05:45) (114/65 - 131/75)  BP(mean): --  RR: 18 (29 Jul 2020 05:45) (18 - 20)  SpO2: 96% (29 Jul 2020 05:45) (95% - 96%)    Constitutional: Elderly woman laying in bed, alert, interactive  Neck: large right supraclavicular soft mass present, non tender   Respiratory: CTA B/L; no wheezes/crackles   Cardiovascular: +S1/S2, irregular rhythm   Gastrointestinal: Soft; NTND; normoactive BS  Extremities: RUE twitching/tremor; bilateral lymphedema, venous statis dermatitis. Left Leg cellulitis, warm, erythema  Neurologic: AAOx 3      MEDICATIONS  (STANDING):  cefTRIAXone   IVPB 1000 milliGRAM(s) IV Intermittent every 24 hours  levETIRAcetam 500 milliGRAM(s) Oral daily    MEDICATIONS  (PRN):  albumin human 25% IVPB 50 milliLiter(s) IV Intermittent every 1 hour PRN to maintain sbp> 100 mmhg intradialysis  QUEtiapine 12.5 milliGRAM(s) Oral every 12 hours PRN Agitation        Allergies    IV Contrast (Anaphylaxis)  Levaquin (Angioedema)  penicillin (Angioedema)    Intolerances          LABS:                        9.4    12.75 )-----------( 144      ( 28 Jul 2020 06:17 )             29.8     07-28    141  |  102  |  36<H>  ----------------------------<  98  3.9   |  25  |  3.72<H>    Ca    8.1<L>      28 Jul 2020 06:17  Phos  3.9     07-28  Mg     2.1     07-28      PT/INR - ( 28 Jul 2020 12:21 )   PT: 15.7 sec;   INR: 1.33          PTT - ( 28 Jul 2020 12:21 )  PTT:56.9 sec  Fingerstick  glucose:       RADIOLOGY & ADDITIONAL TESTS: Reviewed.

## 2020-07-29 NOTE — PROGRESS NOTE ADULT - ASSESSMENT
74F admitted for altered mental status w/concomitant acute renal injury associated with hyperkalemia and uremic encephalopathy and UTI     #anuric KATHARINA on advanced CKD   limited collateral information re baseline kidney function, patient states she has never been on dialysis previously   given limited collateral, uncertain etiology of KATHARINA   CT suggestive of chronic atrophic kidneys / CKD   differential includes ATN ischemic vs toxic, less likely pre-renal or post-renal as history and clinical picture not suggestive and pt remains anuric w/hx of CKD (Cr 2.9 on 07/04/2020) and atrophic kidneys   recovery possible but given advanced kidney disease, likely dialysis dependent   - s/p HD 7/27 for clearance   - appears mental status is @baseline A&Ox2, low concern for renally cleared neurotoxic agents as she has received hemodialysis and remains A&Ox2   - dialysis w/Revaclear to reduce variables contributing to thrombocytopenia (Plt stable for now)   - next hemodialysis was scheduled for 7/29    - R femoral catheter placed 7/25/2020   - consider Palliative Care eval as patient is refusing dialysis   - continue with strict IOs   - EDW 89.8 kg   - pending iron panel, PTH, 25 and 1-25 Vit D level  - continue with phoslo 667 mg TID w/meals 74F admitted for altered mental status w/concomitant acute renal injury associated with hyperkalemia and uremic encephalopathy and UTI     #anuric KATHARINA on advanced CKD   limited collateral information re baseline kidney function, patient states she has never been on dialysis previously   given limited collateral, uncertain etiology of KATHARINA   CT suggestive of chronic atrophic kidneys / CKD   differential includes ATN ischemic vs toxic, less likely pre-renal or post-renal as history and clinical picture not suggestive and pt remains anuric w/hx of CKD (Cr 2.9 on 07/04/2020) and atrophic kidneys   recovery possible but given advanced kidney disease, likely dialysis dependent   - s/p HD 7/27 for clearance   - appears mental status is @baseline A&Ox2, low concern for renally cleared neurotoxic agents as she has received hemodialysis and remains A&Ox2   - dialysis w/Revaclear to reduce variables contributing to thrombocytopenia (Plt stable for now)   - next hemodialysis was scheduled for 7/29    - R femoral catheter placed 7/25/2020   - consider Palliative Care and Psychiatry evaluations as patient is refusing dialysis   - continue with strict IOs   - EDW 89.8 kg   - pending iron panel, PTH, 25 and 1-25 Vit D level  - continue with phoslo 667 mg TID w/meals

## 2020-07-29 NOTE — PROGRESS NOTE ADULT - ATTENDING COMMENTS
refusing dialysis today  no urgent need to push for dialysis today as volume status and electrolytes are in acceptable ranges  will need to clarify goals of care- pt may just be refusing today or she has decided to forego renal replacement therapy  will need palliative care evaluation to help guide-  Would also like to d/w HCP if she has one

## 2020-07-29 NOTE — PROGRESS NOTE ADULT - PROBLEM SELECTOR PLAN 8
Incidental discovery of pelvic lymphadenopathy on CTAP.   -IR consulted for biopsy. Recommend follow up as outpatient for non emergent lymph node biopsy (IR scheduling office [phone]).

## 2020-07-29 NOTE — PROGRESS NOTE ADULT - PROBLEM SELECTOR PLAN 2
LLE warm, erythematous, non purulent, and swollen.   -started ceftriaxone 1g IV q24 on 7/26, but pt refusing on 7/28  -consulting medical ethics LLE warm, erythematous, non purulent, and swollen  -Pt previously refused medical intervention, but is now agreeing to treatment   -resume CTX 1g IV q24 on 7/26  -f/u with medical ethicist LLE warm, erythematous, non purulent, and swollen  -Pt previously refused medical intervention, but is now agreeing to treatment   -resume CTX 1g IV q24 on 7/26. Transition to Cefpodoxime 400mg 3x/wk following HD once HD schedule set up  -f/u with medical ethicist

## 2020-07-29 NOTE — PROGRESS NOTE ADULT - PROBLEM SELECTOR PLAN 1
US [facility] venous lower extremity 7/27 showed extensive LLE DVT from common femoral vein to posterior tibial vein.   -offered Heparin ggt but pt refused  -consulting medical ethics  -f/u [facility]TT [facility]27 showed extensive LLE DVT from common femoral vein to posterior tibial vein.   -Pt previously refused medical intervention, but is now agreeing to treatment  -start lovenox   -f/u with medical ethicist US duplex venous lower extremity 7/27 showed extensive LLE DVT from common femoral vein to posterior tibial vein.   -Pt previously refused medical intervention, but is now agreeing to treatment  -start eliquis  -f/u with medical ethicist US duplex venous lower extremity 7/27 showed extensive LLE DVT from common femoral vein to posterior tibial vein.   -Pt previously refused medical intervention, but is now agreeing to treatment  -start eliquis after permacath placed, heparin gtt for the time being  -f/u with medical ethicist  -may benefit from IVC filter as she is likely not to be adherent meds after discharge

## 2020-07-29 NOTE — PROGRESS NOTE ADULT - PROBLEM SELECTOR PLAN 3
Urine Cx grew Klebsiella and E. Coli  -started ceftriaxone 1g IV q24 on 7/26, but pt refusing on 7/28  -consulting medical ethics Urine Cx grew Klebsiella and E. Coli  -Pt previously refused medical intervention, but is now agreeing to treatment   -resume CTX 1g IV q24 on 7/26  -f/u with medical ethicist Urine Cx grew Klebsiella and E. Coli  -Pt previously refused medical intervention, but is now agreeing to treatment   -resume CTX 1g IV q24 on 7/26. Transition to Cefpodoxime 400mg 3x/wk following HD once HD schedule set up  -f/u with medical ethicist

## 2020-07-29 NOTE — PROGRESS NOTE ADULT - PROBLEM SELECTOR PLAN 6
Pt presented with AMS likely 2/2 uremic encephalopathy d/t acute renal failure vs sepsis 2/2 UTI or cellulitis vs underlying psych disorder  -see plan for acute renal failure  -see plan for cellulitis  -see plan for UTI  -Psych consulted for AMS. Recommends Seroquel 12.5 g PRN for agitation Pt presented with AMS likely 2/2 uremic encephalopathy d/t acute renal failure vs sepsis 2/2 UTI or cellulitis vs underlying psych disorder  -see plan for acute renal failure  -see plan for cellulitis  -see plan for UTI  -Seroquel 12.5 g PRN for agitation

## 2020-07-29 NOTE — PROGRESS NOTE ADULT - PROBLEM SELECTOR PLAN 7
Patient alert forgetful follows commands, iv fluids iv antibx. No c/o pain or nausea. Right lower leg wrapped in cast looking material and ace wrap. Purewick in place, changed this shift, pericare provided. Family at bedside. Bp low in beginning of shift, bp improved as the shift progressed. No acute distress noted will continue to monitor   (RESOLVED)  K 7.8 in ED, wnl after HD. Likely 2/2 acute renal failure  -Continue to monitor

## 2020-07-29 NOTE — PROGRESS NOTE ADULT - ASSESSMENT
74-year-old woman with prior intracranial hemorrhage with residual left-sided weakness, admitted with altered mental status, sepsis, and acute kidney injury.  Mental status has improved over the course of her hospitalization.  EEG showed epileptiform activity in the left posterior quadrant for which she was started on Keppra.  However, considering that she is no longer at high risk for seizures due to improvement in her medical conditions (sepsis and KATHARINA), it is reasonable to stop Keppra and continue to monitor clinically.    -Stop Keppra    Epilepsy will sign off.  Please call with any additional questions.

## 2020-07-29 NOTE — PROGRESS NOTE ADULT - SUBJECTIVE AND OBJECTIVE BOX
Interval History:  Intermittent right arm tremors.  No other seizure-like activity noted.  Mental status continues to improve.    Review of Systems:  Neuro no seizures, headaches  Resp no cough, SOB    MEDICATIONS  (STANDING):  cefTRIAXone   IVPB 1000 milliGRAM(s) IV Intermittent every 24 hours  heparin  Infusion 1500 Unit(s)/Hr (15 mL/Hr) IV Continuous <Continuous>    MEDICATIONS  (PRN):  albumin human 25% IVPB 50 milliLiter(s) IV Intermittent every 1 hour PRN to maintain sbp> 100 mmhg intradialysis  QUEtiapine 12.5 milliGRAM(s) Oral every 12 hours PRN Agitation    Physical Exam:    Vital Signs Last 24 Hrs  T(C): 36.8 (29 Jul 2020 13:10), Max: 37 (29 Jul 2020 05:45)  T(F): 98.3 (29 Jul 2020 13:10), Max: 98.6 (29 Jul 2020 05:45)  HR: 94 (29 Jul 2020 13:10) (80 - 94)  BP: 115/79 (29 Jul 2020 13:10) (115/79 - 131/75)  BP(mean): --  RR: 17 (29 Jul 2020 13:10) (17 - 19)  SpO2: 96% (29 Jul 2020 13:10) (95% - 96%)    Constitutional: no apparent distress  HEENT: moist mucous membranes, oropharynx clear  Musculoskeletal: extremities warm, well-perfused, normal range of motion  Skin: no rashes, bruises, or lesions    Neurologic Exam:  Mental Status: awake and alert, oriented to time, place, and person, follows simple commands  Cranial Nerves: I: deferred; II: pupils equal round and reactiveIII, IV, VI: extraocular movements full with no nystagmus; VII: facial power symmetric; VIII: hearing intact to voice; IX/X: no dysarthria; XI: shoulder shrug symmetric; XII: tongue protrudes midline  Motor: normal bulk and tone, no orbiting or pronator drift, intermittent action tremor of upper extremities bilaterally   Sensation: intact to light touch in distal upper and lower extremities bilaterally  Coordination: finger-nose-finger intact bilaterally      Labs:                                   9.6    13.36 )-----------( 152      ( 29 Jul 2020 10:40 )             30.8   07-29    132<L>  |  93<L>  |  51<H>  ----------------------------<  132<H>  4.6   |  24  |  4.70<H>    Ca    8.3<L>      29 Jul 2020 10:40  Phos  4.7     07-29  Mg     2.0     07-29      EEG    FINAL Summary:  Abnormal continuous av-EEG study.  1)	Left posterior quadrant epileptiform discharges, improved over the course of the recording.  2)	Mild to moderate generalized background slowing, improved over the course of the recording.    FINAL Clinical Correlation:  These findings are suggestive of increased epileptic potential in the left posterior quadrant, as well as a mild to moderate degree of diffuse or multifocal cerebral dysfunction

## 2020-07-29 NOTE — EEG REPORT - NS EEG TEXT BOX
[facility] Department of Neurology  Inpatient Continuous video-Electroencephalography Report    Patient Name:	PRITESH BAL    :	1945  MRN:	[id number]    Study Start Date/Time:  2020, 2:12:01 PM  Study End Date/Time:	2020, 11:07 AM    Referred by: Chery Mason MD    Brief Clinical History:  PRITESH BAL is a 74-year-old woman with sepsis, toxic-metabolic encephalopathy, and an episode of right-arm shaking concerning for seizure.    Diagnosis Code:   R56.9 convulsions/seizure  CPT: 83345 EEG with video 12-26h    Pertinent Medications on Initiation  No antiepileptic drugs    Acquisition Details:  Electroencephalography was acquired using a minimum of 21 channels on an [employer] system v 8.5.1 with electrode placement according to the standard International 10-20 system following ACNS (American Clinical Neurophysiology Society) guidelines for Long-Term Video EEG monitoring.  Anterior temporal T1 and T2 electrodes were utilized whenever possible.   The [employer] automated spike & seizure detections were all reviewed in detail, in addition to extensive portions of raw EEG.    Day 1: 2020 @ 2:12:01 PM to next morning @ 07:00 AM    Background:  continuous, with predominantly sharply contoured  theta/delta frequencies.  Symmetry:  No persistent asymmetries of voltage or frequency.  Posterior Dominant Rhythm:  7 Hz symmetric, well-organized, and well-modulated.  Organization: Rudimentary.  Voltage:  Normal (20+ uV)  Variability: Yes. 		Reactivity: Yes.  N2 sleep: State changes were present without definite N2 sleep transients.  Spontaneous Activity:  Abundant (1+/10s) left posterior quadrant (P3, O1, Pz maximal) spikes and sharp waves.  Periodic/rhythmic activity:  None.  Events:  No electrographic seizures or significant clinical events.  Provocations:  Hyperventilation and Photic stimulation: was not performed.       Daily Summary:    1)	Abundant left posterior quadrant spikes and sharp waves, indicating increased epileptic potential in this region.  2)	Moderate generalized background slowing, often sharply contoured, indicating diffuse or multifocal cerebral dysfunction.  3)	No seizures or significant clinical events.    Read by:  Apryl Parks MD      Daily Updates (from 07:00 am until 07:00 am):      Day 2  2020 @ 7 AM to 2020 @ 7 AM.  EEG was disconnected between 2:09 PM and 6:57 PM.    Pertinent medications: Keppra 500 mg daily    Background:  continuous, with predominantly sharply contoured  theta/delta frequencies.  Symmetry:  No persistent asymmetries of voltage or frequency.  Posterior Dominant Rhythm:  7 Hz symmetric, well-organized, and well-modulated.  Organization: Rudimentary.  Voltage:  Normal (20+ uV)  Variability: Yes. 		Reactivity: Yes.  N2 sleep: State changes were present without definite N2 sleep transients.  Spontaneous Activity:  Frequent (1+/min < 1/10s) left posterior quadrant (P3, O1, Pz maximal) spikes and sharp waves.  Periodic/rhythmic activity:  None.  Events:  No electrographic seizures or significant clinical events.  Provocations:  Hyperventilation and Photic stimulation: was not performed.  Daily Summary:    1)	Frequent left posterior quadrant spikes and sharp waves, indicating increased epileptic potential in this region, less frequent than in the prior recording.  2)	Moderate generalized background slowing, often sharply contoured, indicating diffuse or multifocal cerebral dysfunction.  3)	No seizures or significant clinical events.    Read by:  Apryl Parks MD        Day 3  2020 @ 7 AM to 2020 @ 11:07 AM    Pertinent medications: Keppra 500 mg daily    Background:  continuous, with predominantly sharply contoured  theta/delta frequencies.  Symmetry:  No persistent asymmetries of voltage or frequency.  Posterior Dominant Rhythm:  8 Hz symmetric, well-organized, and well-modulated.  Organization: Rudimentary.  Voltage:  Normal (20+ uV)  Variability: Yes. 		Reactivity: Yes.  N2 sleep: Absent.  Spontaneous Activity:  Frequent (1+/min < 1/10s) left posterior quadrant (P3, O1, Pz maximal) sharp waves.  Periodic/rhythmic activity:  None.  Events:  No electrographic seizures or significant clinical events.  Provocations:  Hyperventilation and Photic stimulation: was not performed.  Daily Summary:    1)	Frequent left posterior quadrant sharp waves, indicating increased epileptic potential in this region.  2)	Mild generalized background slowing, often sharply contoured, indicating diffuse or multifocal cerebral dysunction.  3)	No seizures or significant clinical events.    Read by:  Apryl Parks MD      FINAL Summary:  Abnormal continuous av-EEG study.  1)	Left posterior quadrant epileptiform discharges, improved over the course of the recording.  2)	Mild to moderate generalized background slowing, improved over the course of the recording.    FINAL Clinical Correlation:  These findings are suggestive of increased epileptic potential in the left posterior quadrant, as well as a mild to moderate degree of diffuse or multifocal cerebral dysfunction.            Apryl Pakrs MD  Attending Neurologist, Division of Epilepsy

## 2020-07-29 NOTE — PROGRESS NOTE ADULT - PROBLEM SELECTOR PLAN 4
Anuric KATHARINA on advanced CKD. Uncertain etiology of KATHARINA given limited collateral. CT suggests chronic atrophic kidneys likely d/t CKD. S/p HD x3.  -Nephro consulted, appreciate recs  -R. feboral cath placed 7/25/20  -next HD scheduled 7/29  -f/u collateral from recent hospitalization at [place]  -f/u IR consult for possible PermCath   -c/w phoslo 667 mg TID w/meals Anuric KATHARINA on advanced CKD. Uncertain etiology of KATHARINA given limited collateral. CT suggests chronic atrophic kidneys likely d/t CKD. S/p HD x3.  -Nephro consulted, appreciate recs  -R. feboral cath placed 7/25/20  -next HD scheduled 7/29  -f/u collateral from recent hospitalization at [place]  -f/u IR consult for possible PermCath placement  -c/w phoslo 667 mg TID w/meals Anuric KATHARINA on advanced CKD. Uncertain etiology of KATHARINA given limited collateral. CT suggests chronic atrophic kidneys likely d/t CKD. S/p HD x3.  -Nephro consulted, appreciate recs  -R. feboral cath placed 7/25/20  -next HD scheduled 7/29  -c/w phoslo 667 mg TID w/meals  -f/u collateral from recent hospitalization at [place]  -contact IR for PermCath placement and IVC filter

## 2020-07-29 NOTE — PROGRESS NOTE ADULT - ASSESSMENT
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 Coli)

## 2020-07-29 NOTE — PROGRESS NOTE ADULT - SUBJECTIVE AND OBJECTIVE BOX
Patient is a 74y Female seen and evaluated at bedside. No complaints. Refusing dialysis. BP acceptable       Meds:    albumin human 25% IVPB 50 every 1 hour PRN  cefTRIAXone   IVPB 1000 every 24 hours  heparin  Infusion 1500 <Continuous>  QUEtiapine 12.5 every 12 hours PRN      T(C): , Max: 37 (07-29-20 @ 05:45)  T(F): , Max: 98.6 (07-29-20 @ 05:45)  HR: 80 (07-29-20 @ 05:45)  BP: 123/84 (07-29-20 @ 05:45)  BP(mean): --  RR: 18 (07-29-20 @ 05:45)  SpO2: 96% (07-29-20 @ 05:45)  Wt(kg): --    07-28 @ 07:01  -  07-29 @ 07:00  --------------------------------------------------------  IN: 30 mL / OUT: 0 mL / NET: 30 mL          Review of Systems:  RESPIRATORY: No shortness of breath  CARDIOVASCULAR: No chest pain or shortness of breath  GASTROINTESTINAL: No abdominal pain or vomiting   MUSCULOSKELETAL: No leg edema      PHYSICAL EXAM:  GENERAL: no acute distress at present  CHEST/LUNG: Clear to auscultation bilaterally  HEART: normal S1S2, RRR  ABDOMEN: Soft, Nontender   EXTREMITIES: No edema   NEUROLOGY: A&O x2, no focal neurological deficit  ACCESS: R femoral dialysis catheter       LABS:                        9.6    13.36 )-----------( 152      ( 29 Jul 2020 10:40 )             30.8     07-29    132<L>  |  93<L>  |  51<H>  ----------------------------<  132<H>  4.6   |  24  |  4.70<H>    Ca    8.3<L>      29 Jul 2020 10:40  Phos  4.7     07-29  Mg     2.0     07-29        PT/INR - ( 29 Jul 2020 10:40 )   PT: 14.3 sec;   INR: 1.20          PTT - ( 29 Jul 2020 10:40 )  PTT:23.4 sec          RADIOLOGY & ADDITIONAL STUDIES:

## 2020-07-30 DIAGNOSIS — R59.1 GENERALIZED ENLARGED LYMPH NODES: ICD-10-CM

## 2020-07-30 DIAGNOSIS — G93.40 ENCEPHALOPATHY, UNSPECIFIED: ICD-10-CM

## 2020-07-30 DIAGNOSIS — Z51.5 ENCOUNTER FOR PALLIATIVE CARE: ICD-10-CM

## 2020-07-30 LAB
ANION GAP SERPL CALC-SCNC: 15 MMOL/L — SIGNIFICANT CHANGE UP (ref 5–17)
APTT BLD: 33.6 SEC — SIGNIFICANT CHANGE UP (ref 27.5–35.5)
BASOPHILS # BLD AUTO: 0 K/UL — SIGNIFICANT CHANGE UP (ref 0–0.2)
BASOPHILS NFR BLD AUTO: 0 % — SIGNIFICANT CHANGE UP (ref 0–2)
BUN SERPL-MCNC: 59 MG/DL — HIGH (ref 7–23)
CALCIUM SERPL-MCNC: 8.4 MG/DL — SIGNIFICANT CHANGE UP (ref 8.4–10.5)
CHLORIDE SERPL-SCNC: 94 MMOL/L — LOW (ref 96–108)
CO2 SERPL-SCNC: 24 MMOL/L — SIGNIFICANT CHANGE UP (ref 22–31)
CREAT SERPL-MCNC: 5.13 MG/DL — HIGH (ref 0.5–1.3)
CULTURE RESULTS: SIGNIFICANT CHANGE UP
EOSINOPHIL # BLD AUTO: 0.95 K/UL — HIGH (ref 0–0.5)
EOSINOPHIL NFR BLD AUTO: 7.9 % — HIGH (ref 0–6)
GLUCOSE SERPL-MCNC: 105 MG/DL — HIGH (ref 70–99)
HCT VFR BLD CALC: 32.8 % — LOW (ref 34.5–45)
HGB BLD-MCNC: 10.4 G/DL — LOW (ref 11.5–15.5)
INR BLD: 1.18 — HIGH (ref 0.88–1.16)
LYMPHOCYTES # BLD AUTO: 0.42 K/UL — LOW (ref 1–3.3)
LYMPHOCYTES # BLD AUTO: 3.5 % — LOW (ref 13–44)
MAGNESIUM SERPL-MCNC: 2.2 MG/DL — SIGNIFICANT CHANGE UP (ref 1.6–2.6)
MCHC RBC-ENTMCNC: 30.8 PG — SIGNIFICANT CHANGE UP (ref 27–34)
MCHC RBC-ENTMCNC: 31.7 GM/DL — LOW (ref 32–36)
MCV RBC AUTO: 97 FL — SIGNIFICANT CHANGE UP (ref 80–100)
MONOCYTES # BLD AUTO: 0.42 K/UL — SIGNIFICANT CHANGE UP (ref 0–0.9)
MONOCYTES NFR BLD AUTO: 3.5 % — SIGNIFICANT CHANGE UP (ref 2–14)
NEUTROPHILS # BLD AUTO: 10.22 K/UL — HIGH (ref 1.8–7.4)
NEUTROPHILS NFR BLD AUTO: 84.2 % — HIGH (ref 43–77)
PHOSPHATE SERPL-MCNC: 4.6 MG/DL — HIGH (ref 2.5–4.5)
PLATELET # BLD AUTO: 146 K/UL — LOW (ref 150–400)
POTASSIUM SERPL-MCNC: 5.3 MMOL/L — SIGNIFICANT CHANGE UP (ref 3.5–5.3)
POTASSIUM SERPL-SCNC: 5.3 MMOL/L — SIGNIFICANT CHANGE UP (ref 3.5–5.3)
PROTHROM AB SERPL-ACNC: 14.1 SEC — HIGH (ref 10.6–13.6)
RBC # BLD: 3.38 M/UL — LOW (ref 3.8–5.2)
RBC # FLD: 12.6 % — SIGNIFICANT CHANGE UP (ref 10.3–14.5)
SODIUM SERPL-SCNC: 133 MMOL/L — LOW (ref 135–145)
SPECIMEN SOURCE: SIGNIFICANT CHANGE UP
WBC # BLD: 12.01 K/UL — HIGH (ref 3.8–10.5)
WBC # FLD AUTO: 12.01 K/UL — HIGH (ref 3.8–10.5)

## 2020-07-30 PROCEDURE — 99497 ADVNCD CARE PLAN 30 MIN: CPT | Mod: GC

## 2020-07-30 PROCEDURE — 99498 ADVNCD CARE PLAN ADDL 30 MIN: CPT | Mod: 25

## 2020-07-30 PROCEDURE — 99231 SBSQ HOSP IP/OBS SF/LOW 25: CPT

## 2020-07-30 PROCEDURE — 99497 ADVNCD CARE PLAN 30 MIN: CPT | Mod: 25

## 2020-07-30 PROCEDURE — 99223 1ST HOSP IP/OBS HIGH 75: CPT

## 2020-07-30 PROCEDURE — 99233 SBSQ HOSP IP/OBS HIGH 50: CPT | Mod: GC

## 2020-07-30 RX ORDER — RISPERIDONE 4 MG/1
0.5 TABLET ORAL
Refills: 0 | Status: DISCONTINUED | OUTPATIENT
Start: 2020-07-30 | End: 2020-08-04

## 2020-07-30 RX ADMIN — RISPERIDONE 0.5 MILLIGRAM(S): 4 TABLET ORAL at 19:06

## 2020-07-30 NOTE — PROGRESS NOTE ADULT - ATTENDING COMMENTS
advanced CKD with KATHARINA- probable ESRD  refusing dialysis  as volume status and electrolytes do not require urgent dialysis, will defer dialysis treatment today.  Need more clarification in regard to patient's capacity in regard to foregoing dialysis- her answers to me relate an understanding of the process/treament/condition      will review again with med team

## 2020-07-30 NOTE — DIETITIAN INITIAL EVALUATION ADULT. - OTHER INFO
73y/o female with history of CVA 9/2013,with left sided weakness, Heart failure. HTN,B/L OA of knees,CKD admitted with change in mental status and need for HD 2/2 uremic and noted to be encephalopathic. .Patient continues to refuse HD.Refusing most foods.Seen by SLP with recommendation for ongoing renal diet. No N/V/D/C or pain reported.146% of IBW.BMI:30.4.Unable to quantify diet history of weight history.Nursing aide reported patient has been declining all food offerings. Skin with No PU but noted DTI's of B/L heels.

## 2020-07-30 NOTE — PROGRESS NOTE ADULT - SUBJECTIVE AND OBJECTIVE BOX
Patient was seen and examined at bedside. No acute event overnight. No complaints. Denies nv, chest pain, LE pain. Mental status stable but not orientated.     Vital Signs Last 24 Hrs  T(C): 37.1 (30 Jul 2020 05:33), Max: 37.1 (30 Jul 2020 05:33)  T(F): 98.7 (30 Jul 2020 05:33), Max: 98.7 (30 Jul 2020 05:33)  HR: 93 (30 Jul 2020 05:33) (93 - 97)  BP: 118/81 (30 Jul 2020 05:33) (115/79 - 128/86)  BP(mean): --  RR: 17 (30 Jul 2020 05:33) (17 - 18)  SpO2: 96% (30 Jul 2020 05:33) (96% - 96%)    Physical Exam:  General: NAD  Pulmonary: Nonlabored breathing, no respiratory distress  Cardiovascular: NSR  Abdominal: soft, NT/ND  Extremities: WWP, extensive edema on LLE, dry skin with crackles b/l, erythema frankly reduced.   Neuro: no focal deficits  Pulses: palpable distal pulses    Lines/drains/tubes:    I&O's Summary      LABS:                        10.4   12.01 )-----------( 146      ( 30 Jul 2020 07:56 )             32.8     07-30    133<L>  |  94<L>  |  59<H>  ----------------------------<  105<H>  5.3   |  24  |  5.13<H>    Ca    8.4      30 Jul 2020 07:54  Phos  4.6     07-30  Mg     2.2     07-30      PT/INR - ( 30 Jul 2020 07:55 )   PT: 14.1 sec;   INR: 1.18          PTT - ( 30 Jul 2020 07:55 )  PTT:33.6 sec    CAPILLARY BLOOD GLUCOSE            RADIOLOGY & ADDITIONAL STUDIES:

## 2020-07-30 NOTE — CONSULT NOTE ADULT - PROBLEM SELECTOR RECOMMENDATION 2
Etiology multifactorial including metabolic encephalopathy from uremia/UTI. underlying psychiatric conditions possible contributing.

## 2020-07-30 NOTE — PROGRESS NOTE ADULT - PROBLEM SELECTOR PLAN 4
Anuric KATHARINA on advanced CKD. Uncertain etiology of KATHARINA given limited collateral. CT suggests chronic atrophic kidneys likely d/t CKD. S/p HD x3.  -Nephro consulted, appreciate recs  -R. feboral cath placed 7/25/20  -next HD scheduled 7/30  -c/w phoslo 667 mg TID w/meals  -f/u collateral from recent hospitalization at [place]  -plan for PermCath placement and IVC filter Anuric KATHARINA on advanced CKD. KATHARINA most likely attributed to otc NSAID abuse in the setting of OA (according to records obtained from previous medical stay at [place]). CT suggests chronic atrophic kidneys likely d/t CKD. S/p HD x3.  -Nephro consulted, appreciate recs  -R. femoral cath placed 7/25/20  -next HD scheduled 7/30  -c/w phoslo 667 mg TID w/meals  -f/u collateral from recent hospitalization at [place]  -plan for PermCath placement and IVC filter

## 2020-07-30 NOTE — CONSULT NOTE ADULT - PROBLEM SELECTOR RECOMMENDATION 4
Met with patient to discuss goals of care. Pt does not have global decision making capacity but does endorse that she would not want to undergo any surgery or dialysis. She stated "if it's my time to go it's my time".  -Appreciate Ethics consult  -No surrogate decision makers identified aside from Adult Protective Services. -Discussed with palliative SW who will search for any family/friends of patient.    -Patient will benefit from multidisciplinary input regarding best plan of care given complex medical situation.

## 2020-07-30 NOTE — CONSULT NOTE ADULT - ATTENDING COMMENTS
ARF, hyperkalemia, emergently dialyzed, tolerated well. Suspect urosepsis, unclear renal fx at baseline
Patient seen and examined.  Meds, labs and vitals all reviewed.  Patient with KATHARINA, requiring HD, encephalopathy, with likely component of psychiatric disorder.   [facility] minutes spent with patient at bedside.  Discussed her advanced renal failure, need for HD and consequences if HD was not offered.  Although at this point, patient appears to lack global decision making, she is adamantly refusing HD. or placement of any further ports or catheters. Overall complex situation, where patient clearly stated several times that if its her time to go, she wants to go comfortably and does not want aggressive interventions. No surrogates have been identified. Given patients insistence that she doesn't want aggressive interventions, it would not be reasonable to force her to receive these interventions against her will, even if it meant prolonging her life, without a quality component. Situation is also complicated by significant lymphadenopathy present on imaging and on exam, raising suspicion for malignancy. Multidisciplenary team discussion between primary team, palliative care, ethics, psychiatry and nephrology tomorrow at 11 am to discuss next steps. Given complex situation, an approach of non aggressive care, comfort based approach and hospice would be reasonable.

## 2020-07-30 NOTE — PROGRESS NOTE ADULT - PROBLEM SELECTOR PLAN 6
Pt presented with AMS likely 2/2 uremic encephalopathy d/t acute renal failure vs sepsis 2/2 UTI or cellulitis vs underlying psych disorder  -see plan for acute renal failure  -see plan for cellulitis  -see plan for UTI  -Seroquel 12.5 g PRN for agitation

## 2020-07-30 NOTE — DIETITIAN INITIAL EVALUATION ADULT. - PROBLEM SELECTOR PLAN 3
K 7.8 in ED  Decreased to 6.6 after treatment  HD done by nephrology 7/25 night  F/u BMP   f/u nephrology recs

## 2020-07-30 NOTE — DIETITIAN INITIAL EVALUATION ADULT. - DIET TYPE
oral very poor renal replacement pts:no protein restr,no conc K & phos, low sodium/d/c CCHO add 1 Nepro

## 2020-07-30 NOTE — CONSULT NOTE ADULT - ASSESSMENT
Ms. 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 She was recently admitted in [place] after fall and discharged to [facility] after renal failure treatment and subsequently released home few days ago. Patient admitted for acute renal failure requiring urgent dialysis. Pt now refusing all treatment. Patient lacks capacity to make complex medical decisions.     Palliative care consulted for goals of care.

## 2020-07-30 NOTE — PROGRESS NOTE ADULT - ASSESSMENT
· Assessment	  Assessment: Ms. Salazar is a 74yF who is presenting with altered mental status and acute renal failure admitted to SICU for urgent dialysis.    Recommendations:   - Continue to offer AC options - if not tolerating hep drip she can be on Eliquis 10 BID for 7 days and then 5mg BID for at least 3 months  -Vascular will continue to follow, call if pt needs permanent access int he future  -Appreciate renal recs   - Will have discussion with attending for possible IVC filter placment · Assessment	  Assessment: Ms. Salazar is a 74yF who is presenting with altered mental status and acute renal failure admitted to SICU for urgent dialysis.    Recommendations:   - Continue to offer AC options - if not tolerating hep drip she can be on Eliquis 10 BID for 7 days and then 5mg BID for at least 3 months  -Vascular will sign off for now  - no indication for IVC filter placement now  - Follow up with dr. Mccarthy at the office for permanent dialysis access  -Appreciate renal recs   - Discussed with chief on call

## 2020-07-30 NOTE — PROGRESS NOTE ADULT - PROBLEM SELECTOR PLAN 8
Incidental discovery of pelvic lymphadenopathy on CTAP.   -IR consulted for biopsy. Recommend follow up as outpatient for non emergent lymph node biopsy (IR scheduling office [phone]).

## 2020-07-30 NOTE — PROGRESS NOTE ADULT - SUBJECTIVE AND OBJECTIVE BOX
OVERNIGHT EVENTS:   No acute events overnight.     SUBJECTIVE:    Patient seen and examined at bedside. Unable to assess ROS as pt refusing cooperate with exam.      PHYSICAL EXAM:  Vital Signs Last 24 Hrs  T(C): 37.1 (30 Jul 2020 05:33), Max: 37.1 (30 Jul 2020 05:33)  T(F): 98.7 (30 Jul 2020 05:33), Max: 98.7 (30 Jul 2020 05:33)  HR: 93 (30 Jul 2020 05:33) (93 - 97)  BP: 118/81 (30 Jul 2020 05:33) (115/79 - 128/86)  BP(mean): --  RR: 17 (30 Jul 2020 05:33) (17 - 18)  SpO2: 96% (30 Jul 2020 05:33) (96% - 96%)    General: NAD, laying in bed, alert, interactive  *Pt refused to allow physician to perform remainder of physical exam      MEDICATIONS  (STANDING):  cefTRIAXone   IVPB 1000 milliGRAM(s) IV Intermittent every 24 hours  heparin  Infusion 1500 Unit(s)/Hr (15 mL/Hr) IV Continuous <Continuous>  risperiDONE   Tablet 0.5 milliGRAM(s) Oral two times a day    MEDICATIONS  (PRN):  albumin human 25% IVPB 50 milliLiter(s) IV Intermittent every 1 hour PRN to maintain sbp> 100 mmhg intradialysis  QUEtiapine 12.5 milliGRAM(s) Oral every 12 hours PRN Agitation        Allergies    IV Contrast (Anaphylaxis)  Levaquin (Angioedema)  penicillin (Angioedema)    Intolerances          LABS:                        10.4   12.01 )-----------( 146      ( 30 Jul 2020 07:56 )             32.8     07-30    133<L>  |  94<L>  |  59<H>  ----------------------------<  105<H>  5.3   |  24  |  5.13<H>    Ca    8.4      30 Jul 2020 07:54  Phos  4.6     07-30  Mg     2.2     07-30      PT/INR - ( 30 Jul 2020 07:55 )   PT: 14.1 sec;   INR: 1.18          PTT - ( 30 Jul 2020 07:55 )  PTT:33.6 sec  Fingerstick  glucose:       RADIOLOGY & ADDITIONAL TESTS: Reviewed.

## 2020-07-30 NOTE — CONSULT NOTE ADULT - PROBLEM SELECTOR RECOMMENDATION 9
Anuric KATHARINA( unclear baseline)  Cr from early July ~ 3  would proceed with emergent HD for hyperkalemia and uremic encephalopathic  - HD for clearance 3 hours, no UF, K bath 2  - c/w ceftriaxone and follow uClx, poorly dialyzable, no need to dose post HD  no HCP or guardian available at present will proceed with 2 physician consent given acuity   Will follow
Pt required urgent dialysis on admission  Nephrology following closely, recommending further treatment with dialysis although pt refusing

## 2020-07-30 NOTE — PROGRESS NOTE ADULT - PROBLEM SELECTOR PLAN 2
LLE warm, erythematous, non purulent, and swollen   -offered CTX 1g IV q24, but pt refusing treatment. Transition to Cefpodoxime 400mg 3x/wk following HD once HD schedule set up  -medical ethicist consulted, appreciate recs

## 2020-07-30 NOTE — DIETITIAN INITIAL EVALUATION ADULT. - PROBLEM SELECTOR PLAN 2
History of prior hyperkalemia episode treated medically at [facility], discharged to [facility] for LEYDA  - Nephrology consulted for urgent HD and femoral HD catheter placed by pulm fellow  - Vascular consulted for potential fistula placement this admission  - Minimal UOP since admission; however, patient severely dehydrated  - Monitor I's / O's  #Uremia    - No reports of pruritis or poor PO intake; however, patient significantly altered  - BUN in  (as above)  - Monitor BMP  - Monitor for sign of bleeding

## 2020-07-30 NOTE — CONSULT NOTE ADULT - SUBJECTIVE AND OBJECTIVE BOX
PRITESH BAL          MRN-[id number]            (mm/dd/yyyy)    HPI:  Ms. Bal is a 75 yo female with a PMHx notable for hemorrhagic CVA 2013 with residual left sided weakness and HTN, b/l OA knees, CKD, who was BIBEMS for AMS. She was recently admitted in [place] after fall and discharged to [facility] after renal failure treatment and subsequently released home few days ago. Her doorman called EMS as he had not seen her for past 2 days and she wasn't picking up food left at her door. She was found down in her apartment and was unable to give any information to EMS. Of note, she is unable to give a clear history at time of interview. ROS could not be obtained due to clinical and mental status.     ED Course:  VS: Tmax: 97.2, HR: 48, BP: 117/81 , RR: 16, O2: 100%. When patient was examined in the ED, she was alert, awake but confused, non cooperative shaky.  Labs was notable for leukocytosis, normal lactate, acute renal failure with anion gap acidosis and hyperkalemia cr 9.03, K 7.8 and Bicarb 17, . ECG with peaked t waves. started insulin/glucose, Lokelma, bicarb, calcium iv, lokelma. K corrected to 6.6  ivf administered, zabala placed.  purulent urine noted.  started ceftriaxone.  1L ns given.  also with troponin elevation, Nephrology consulted for urgent ED and vascular surgery consulted for future fistula placement. She was admitted for urgent HD medicine telemetry. (2020 17:15)      PAST MEDICAL & SURGICAL HISTORY:  Acute renal failure (ARF)  Venous stasis dermatitis  (HFpEF) heart failure with preserved ejection fraction  Osteoarthritis of both knees, unspecified osteoarthritis type  Hemorrhagic cerebrovascular accident (CVA):   Essential hypertension  H/O: hysterectomy: at age 41      FAMILY HISTORY:  No pertinent family history in first degree relatives   Reviewed and found non contributory in mother or father    SOCIAL HISTORY:     ROS:      Dyspnea (Carmencita 0-10): 0               N/V (Y/N): N                             Secretions (Y/N) : N               Agitation(Y/N): N  Pain (Y/N):     http://geriatrictoolkit.[facility]/cog/painad.pdf  https://www.[facility].gov.au/__data/assets/pdf_file/0018/143436/[place]_Pain_Scale_Final.pdf  -Provocation/Palliation:  -Quality/Quantity:  -Radiating:  -Severity:  -Timing/Frequency:  -Impact on ADLs:    General:  Denied  HEENT:    Denied  Neck:  Denied  CVS:  Denied  Resp:  Denied  GI:  Denied Last BM:     :  Denied  Musc:  Denied  Neuro:  Denied  Psych:  Denied  Skin:  Denied  Lymph:  Denied    Allergies    IV Contrast (Anaphylaxis)  Levaquin (Angioedema)  penicillin (Angioedema)    Intolerances      Opiate Naive (Y/N):   -iStop reviewed (Y/N): Yes. No Rx found on iStop review (Ref#:           )    Medications:      MEDICATIONS  (STANDING):  cefTRIAXone   IVPB 1000 milliGRAM(s) IV Intermittent every 24 hours  heparin  Infusion 1500 Unit(s)/Hr (15 mL/Hr) IV Continuous <Continuous>  risperiDONE   Tablet 0.5 milliGRAM(s) Oral two times a day    MEDICATIONS  (PRN):  albumin human 25% IVPB 50 milliLiter(s) IV Intermittent every 1 hour PRN to maintain sbp> 100 mmhg intradialysis  QUEtiapine 12.5 milliGRAM(s) Oral every 12 hours PRN Agitation      Labs:    CBC:                        10.4   12.01 )-----------( 146      ( 2020 07:56 )             32.8     CMP:        133<L>  |  94<L>  |  59<H>  ----------------------------<  105<H>  5.3   |  24  |  5.13<H>    Ca    8.4      2020 07:54  Phos  4.6       Mg     2.2          Albumin, Serum: 3.2 g/dL (20 @ 06:10)    PT/INR - ( 2020 07:55 )   PT: 14.1 sec;   INR: 1.18          PTT - ( 2020 07:55 )  PTT:33.6 sec       Imaging:  Reviewed    PEx:  T(C): 37.1 (20 @ 05:33), Max: 37.1 (20 @ 05:33)  HR: 93 (20 @ 05:33) (93 - 97)  BP: 118/81 (20 @ 05:33) (118/81 - 128/86)  RR: 17 (20 @ 05:33) (17 - 18)  SpO2: 96% (20 @ 05:33) (96% - 96%)  Wt(kg): --  Daily     Daily     General:   HEENT:    Neck:   CVS:   Resp:   GI:    :    Musc:     Neuro:   Psych:     Skin:  Lymph:  Preadmit Karnofsky:  %           Current Karnofsky:     %  http://www.npcrc.org/files/news/karnofsky_performance_scale.pdf   http://www.npcrc.org/files/news/palliative_performance_scale_PPSv2.pdf  Cachexia (Y/N):   BMI:    Advanced Directives:     Full Code     DNR/DNI     MOLST     HCP     DPOA     Living Will     Decision maker:  Legal surrogate:    GOALS OF CARE DISCUSSION       Palliative care info/counseling provided	           Family meeting       Advanced Directives addressed please see Advance Care Planning Note	           See previous Palliative Medicine Note       Documentation of GOC: 	    REFERRALS	        Palliative Med        Unit SW/Case Mgmt              Speech/Swallow       Patient/Family Support       Massage Therapy       Music Therapy       Pet Therapy       Hospice       Nutrition       Dietician       PT/OT PRITESH BAL          MRN-[id number]            [birth year]    HPI:  Ms. Bal is a 73 yo female with a PMHx notable for hemorrhagic CVA 2013 with residual left sided weakness and HTN, b/l OA knees, CKD, who was BIBEMS for AMS. She was recently admitted in [place] after fall and discharged to [facility] after renal failure treatment and subsequently released home few days ago. Her doorman called EMS as he had not seen her for past 2 days and she wasn't picking up food left at her door. She was found down in her apartment and was unable to give any information to EMS. Of note, she is unable to give a clear history at time of interview. ROS could not be obtained due to clinical and mental status.     ED Course:  VS: Tmax: 97.2, HR: 48, BP: 117/81 , RR: 16, O2: 100%. When patient was examined in the ED, she was alert, awake but confused, non cooperative shaky.  Labs was notable for leukocytosis, normal lactate, acute renal failure with anion gap acidosis and hyperkalemia cr 9.03, K 7.8 and Bicarb 17, . ECG with peaked t waves. started insulin/glucose, Lokelma, bicarb, calcium iv, lokelma. K corrected to 6.6  ivf administered, zabala placed.  purulent urine noted.  started ceftriaxone.  1L ns given.  also with troponin elevation, Nephrology consulted for urgent ED and vascular surgery consulted for future fistula placement. She was admitted for urgent HD medicine telemetry. (2020 17:15)      PAST MEDICAL & SURGICAL HISTORY:  Acute renal failure (ARF)  Venous stasis dermatitis  (HFpEF) heart failure with preserved ejection fraction  Osteoarthritis of both knees, unspecified osteoarthritis type  Hemorrhagic cerebrovascular accident (CVA):   Essential hypertension  H/O: hysterectomy: at age 41      FAMILY HISTORY:  No pertinent family history in first degree relatives   Reviewed and found non contributory in mother or father    SOCIAL HISTORY:     ROS:      Dyspnea (Carmencita 0-10): 0               N/V (Y/N): N                             Secretions (Y/N) : N               Agitation(Y/N): N  Pain (Y/N): N  http://geriatrictoolkit.[place].[facility]/cog/painad.pdf  https://www.aci.health.[place].gov.au/__data/assets/pdf_file/0018/402908/[place]_Pain_Scale_Final.pdf  -Provocation/Palliation:  -Quality/Quantity:  -Radiating:  -Severity:  -Timing/Frequency:  -Impact on ADLs:    General:  Denied  HEENT:    Denied  Neck:  Denied  CVS:  Denied  Resp:  Denied  GI:  Denied Last BM: unknown    :  Denied  Musc:  Denied  Neuro:  Denied  Psych:  Denied  Skin:  Denied  Lymph:  Denied    Allergies    IV Contrast (Anaphylaxis)  Levaquin (Angioedema)  penicillin (Angioedema)    Intolerances      Opiate Naive (Y/N): N  -iStop reviewed (Y/N): Yes. Most recent rx in July for oxycodone 20mg 220 tabs for 30 days (Ref#: [id number])    Medications:      MEDICATIONS  (STANDING):  cefTRIAXone   IVPB 1000 milliGRAM(s) IV Intermittent every 24 hours  heparin  Infusion 1500 Unit(s)/Hr (15 mL/Hr) IV Continuous <Continuous>  risperiDONE   Tablet 0.5 milliGRAM(s) Oral two times a day    MEDICATIONS  (PRN):  albumin human 25% IVPB 50 milliLiter(s) IV Intermittent every 1 hour PRN to maintain sbp> 100 mmhg intradialysis  QUEtiapine 12.5 milliGRAM(s) Oral every 12 hours PRN Agitation      Labs:    CBC:                        10.4   12.01 )-----------( 146      ( 2020 07:56 )             32.8     CMP:        133<L>  |  94<L>  |  59<H>  ----------------------------<  105<H>  5.3   |  24  |  5.13<H>    Ca    8.4      2020 07:54  Phos  4.6       Mg     2.2          Albumin, Serum: 3.2 g/dL (20 @ 06:10)    PT/INR - ( 2020 07:55 )   PT: 14.1 sec;   INR: 1.18          PTT - ( 2020 07:55 )  PTT:33.6 sec       Imaging:  Reviewed    PEx:  T(C): 37.1 (20 @ 05:33), Max: 37.1 (20 @ 05:33)  HR: 93 (20 @ 05:33) (93 - 97)  BP: 118/81 (20 @ 05:33) (118/81 - 128/86)  RR: 17 (20 @ 05:33) (17 - 18)  SpO2: 96% (20 @ 05:33) (96% - 96%)  Wt(kg): --  Daily         General: lying comfortably in bed, no acute distress  HEENT: temporal wasting  Neck:   CVS:   Resp: nonlabored respirations  GI:    :    Musc:     Neuro:   Psych:  alert, oriented to person and place   Skin:   Lymph:  PPSV: 40-50%  http://www.npcrc.org/files/news/karnofsky_performance_scale.pdf   http://www.npcrc.org/files/news/palliative_performance_scale_PPSv2.pdf  Cachexia (Y/N):   BMI:    Advanced Directives:     Full Code    Decision maker: Pt lacks capacity to make complex decisions  Legal surrogate: Nita Hawley (Adult protective services)    GOALS OF CARE DISCUSSION       Palliative care info/counseling provided	    	           See previous Palliative Medicine Note       Documentation of GOC: Patient stated "if I'm going to die I'm going to die.    Everyone dies from something'. She stated she did not want to any type of surgery or hemodialysis. Patient did not demonstrate understanding of her current medical conditions.    REFERRALS	        Palliative Med        Patient/Family Support PRITESH BAL          MRN-[id number]            [birth year]    HPI:  Ms. Bal is a 75 yo female with a PMHx notable for hemorrhagic CVA 2013 with residual left sided weakness and HTN, b/l OA knees, CKD, who was BIBEMS for AMS. She was recently admitted in [place] after fall and discharged to [facility] after renal failure treatment and subsequently released home few days ago. Her doorman called EMS as he had not seen her for past 2 days and she wasn't picking up food left at her door. She was found down in her apartment and was unable to give any information to EMS. Of note, she is unable to give a clear history at time of interview. ROS could not be obtained due to clinical and mental status.     ED Course:  VS: Tmax: 97.2, HR: 48, BP: 117/81 , RR: 16, O2: 100%. When patient was examined in the ED, she was alert, awake but confused, non cooperative shaky.  Labs was notable for leukocytosis, normal lactate, acute renal failure with anion gap acidosis and hyperkalemia cr 9.03, K 7.8 and Bicarb 17, . ECG with peaked t waves. started insulin/glucose, Lokelma, bicarb, calcium iv, lokelma. K corrected to 6.6  ivf administered, zabala placed.  purulent urine noted.  started ceftriaxone.  1L ns given.  also with troponin elevation, Nephrology consulted for urgent ED and vascular surgery consulted for future fistula placement. She was admitted for urgent HD medicine telemetry. (2020 17:15)      PAST MEDICAL & SURGICAL HISTORY:  Acute renal failure (ARF)  Venous stasis dermatitis  (HFpEF) heart failure with preserved ejection fraction  Osteoarthritis of both knees, unspecified osteoarthritis type  Hemorrhagic cerebrovascular accident (CVA):   Essential hypertension  H/O: hysterectomy: at age 41      FAMILY HISTORY:  No pertinent family history in first degree relatives   Reviewed and found non contributory in mother or father    SOCIAL HISTORY:     ROS:      Dyspnea (Carmencita 0-10): 0               N/V (Y/N): N                             Secretions (Y/N) : N               Agitation(Y/N): N  Pain (Y/N): N  http://geriatrictoolkit.[place].[facility]/cog/painad.pdf  https://www.aci.health.[place].gov.au/__data/assets/pdf_file/0018/479659/[place]_Pain_Scale_Final.pdf  -Provocation/Palliation:  -Quality/Quantity:  -Radiating:  -Severity:  -Timing/Frequency:  -Impact on ADLs:    General:  Denied  HEENT:    Denied  Neck:  Denied  CVS:  Denied  Resp:  Denied  GI:  Denied Last BM: unknown    :  Denied  Musc:  Denied  Neuro:  Denied  Psych:  Denied  Skin:  Denied  Lymph:  Denied    Allergies    IV Contrast (Anaphylaxis)  Levaquin (Angioedema)  penicillin (Angioedema)    Intolerances      Opiate Naive (Y/N): N  -iStop reviewed (Y/N): Yes. Most recent rx in July for oxycodone 20mg 220 tabs for 30 days (Ref#: [id number])    Medications:      MEDICATIONS  (STANDING):  cefTRIAXone   IVPB 1000 milliGRAM(s) IV Intermittent every 24 hours  heparin  Infusion 1500 Unit(s)/Hr (15 mL/Hr) IV Continuous <Continuous>  risperiDONE   Tablet 0.5 milliGRAM(s) Oral two times a day    MEDICATIONS  (PRN):  albumin human 25% IVPB 50 milliLiter(s) IV Intermittent every 1 hour PRN to maintain sbp> 100 mmhg intradialysis  QUEtiapine 12.5 milliGRAM(s) Oral every 12 hours PRN Agitation      Labs:    CBC:                        10.4   12.01 )-----------( 146      ( 2020 07:56 )             32.8     CMP:        133<L>  |  94<L>  |  59<H>  ----------------------------<  105<H>  5.3   |  24  |  5.13<H>    Ca    8.4      2020 07:54  Phos  4.6       Mg     2.2          Albumin, Serum: 3.2 g/dL (20 @ 06:10)    PT/INR - ( 2020 07:55 )   PT: 14.1 sec;   INR: 1.18          PTT - ( 2020 07:55 )  PTT:33.6 sec       Imaging:  Reviewed        PEx:  T(C): 37.1 (20 @ 05:33), Max: 37.1 (20 @ 05:33)  HR: 93 (20 @ 05:33) (93 - 97)  BP: 118/81 (20 @ 05:33) (118/81 - 128/86)  RR: 17 (20 @ 05:33) (17 - 18)  SpO2: 96% (20 @ 05:33) (96% - 96%)  Wt(kg): --  Daily         General: lying comfortably in bed, no acute distress  HEENT: temporal wasting  Resp: nonlabored respirations  Musc:  moves all extremeties   Neuro:  oriented to person and place   Skin: Large, right supraclavicular mass    PPSV: 40-50%  http://www.npcrc.org/files/news/karnofsky_performance_scale.pdf   http://www.npcrc.org/files/news/palliative_performance_scale_PPSv2.pdf  Cachexia (Y/N):   BMI: 30.4    Advanced Directives:     Full Code    Decision maker: Pt lacks capacity to make complex decisions  Legal surrogate: Nita Hawley (Adult protective services)    GOALS OF CARE DISCUSSION       Palliative care info/counseling provided	    	           Documentation of GOC:  Patient did not demonstrate understanding of her current medical conditions. She did state "if I'm going to die I'm going to die. Everyone dies from something.  She stated she did not want to any type of surgery or hemodialysis.     REFERRALS	        Palliative Med        Patient/Family Support

## 2020-07-30 NOTE — CONSULT NOTE ADULT - PROBLEM SELECTOR PROBLEM 1
KATHARINA (acute kidney injury)
Encounter for evaluation of ability to make decisions regarding care
Acute renal failure superimposed on chronic kidney disease

## 2020-07-30 NOTE — DIETITIAN INITIAL EVALUATION ADULT. - PROBLEM SELECTOR PLAN 1
AAO x 1 in the ED  Metabolic encephalopathy due to ARF/ uremia  Lactate 1.8  AG 28    HD done by nephrology 7/25 evening  f/u nephrology recs

## 2020-07-30 NOTE — PROGRESS NOTE ADULT - PROBLEM SELECTOR PLAN 3
Urine Cx grew Klebsiella and E. Coli  -offered CTX 1g IV q24, but pt refusing treatment. Transition to Cefpodoxime 400mg 3x/wk following HD once HD schedule set up  -medical ethicist consulted, appreciate recs

## 2020-07-30 NOTE — PROGRESS NOTE ADULT - ASSESSMENT
74F admitted for altered mental status w/concomitant acute renal injury associated with hyperkalemia and uremic encephalopathy and UTI     #anuric KATHARINA on advanced CKD   limited collateral information re baseline kidney function, patient states she has never been on dialysis previously   given limited collateral, uncertain etiology of KATHARINA   CT suggestive of chronic atrophic kidneys / CKD   differential includes ATN ischemic vs toxic, less likely pre-renal or post-renal as history and clinical picture not suggestive and pt remains anuric w/hx of CKD (Cr 2.9 on 07/04/2020) and atrophic kidneys   recovery possible but given advanced kidney disease, likely dialysis dependent   - s/p HD 7/27 for clearance   - appears mental status is @baseline A&Ox2, low concern for renally cleared neurotoxic agents as she has received hemodialysis and remains A&Ox2   - dialysis w/Revaclear to reduce variables contributing to thrombocytopenia (Plt stable for now)   - repeat hemodialysis session was scheduled for 7/29, however patient w/capacity is still refusing dialysis   - R femoral catheter placed 7/25/2020, IR consult for THC on hold    - consider Palliative Care and Psychiatry evaluations as patient is refusing dialysis and has psychiatric history   - continue with strict IOs   - EDW 89.8 kg   - pending iron panel, PTH, 25 and 1-25 Vit D level  - continue with phoslo 667 mg TID w/meals

## 2020-07-30 NOTE — PROGRESS NOTE ADULT - PROBLEM SELECTOR PLAN 5
RUE jerking motion witnessed this afternoon. Unclear if it's new or worsening of tremors.   -vEEG showed no seizure activity, but showed increased epileptic potential in the posterior quadrant and diffuse/multifocal cerebral dysfunction.  -jahaira/c Keppra

## 2020-07-30 NOTE — PROGRESS NOTE ADULT - PROBLEM SELECTOR PLAN 1
US duplex venous lower extremity 7/27 showed extensive LLE DVT from common femoral vein to posterior tibial vein.   -offered heparin gtt, but pt refusing treatment  -medical ethicist consulted, appreciate recs  -start eliquis after permacath placed, continue to offer heparin gtt for the time being  -may benefit from IVC filter as she is likely not to be adherent meds after discharge

## 2020-07-30 NOTE — PROGRESS NOTE ADULT - SUBJECTIVE AND OBJECTIVE BOX
Patient is a 74y Female seen and evaluated at bedside. No complaints; asking for water/juice. Feels okay. BP acceptable. Still refusing hemodialysis       Meds:    albumin human 25% IVPB 50 every 1 hour PRN  cefTRIAXone   IVPB 1000 every 24 hours  heparin  Infusion 1500 <Continuous>  QUEtiapine 12.5 every 12 hours PRN  risperiDONE   Tablet 0.5 two times a day      T(C): , Max: 37.1 (07-30-20 @ 05:33)  T(F): , Max: 98.7 (07-30-20 @ 05:33)  HR: 93 (07-30-20 @ 05:33)  BP: 118/81 (07-30-20 @ 05:33)  BP(mean): --  RR: 17 (07-30-20 @ 05:33)  SpO2: 96% (07-30-20 @ 05:33)  Wt(kg): --      Review of Systems:  RESPIRATORY: No shortness of breath  CARDIOVASCULAR: No chest pain or shortness of breath  GASTROINTESTINAL: No abdominal pain or vomiting   MUSCULOSKELETAL: No leg edema      PHYSICAL EXAM:  GENERAL: no acute distress at present  CHEST/LUNG: Clear to auscultation bilaterally  HEART: normal S1S2, RRR  ABDOMEN: Soft, Nontender   EXTREMITIES: trace edema   NEUROLOGY: A&O x2, no focal neurological deficit  ACCESS: R femoral dialysis catheter       LABS:                        10.4   12.01 )-----------( 146      ( 30 Jul 2020 07:56 )             32.8     07-30    133<L>  |  94<L>  |  59<H>  ----------------------------<  105<H>  5.3   |  24  |  5.13<H>    Ca    8.4      30 Jul 2020 07:54  Phos  4.6     07-30  Mg     2.2     07-30        PT/INR - ( 30 Jul 2020 07:55 )   PT: 14.1 sec;   INR: 1.18          PTT - ( 30 Jul 2020 07:55 )  PTT:33.6 sec          RADIOLOGY & ADDITIONAL STUDIES:

## 2020-07-30 NOTE — CONSULT NOTE ADULT - PROBLEM SELECTOR RECOMMENDATION 3
CT A/P significant for pelvic lymphadenopathy concerning for malignancy. Patient not willing to undergo further workup to determine cause of LAD.

## 2020-07-31 PROCEDURE — 99233 SBSQ HOSP IP/OBS HIGH 50: CPT

## 2020-07-31 PROCEDURE — 99498 ADVNCD CARE PLAN ADDL 30 MIN: CPT | Mod: 25

## 2020-07-31 PROCEDURE — 99497 ADVNCD CARE PLAN 30 MIN: CPT | Mod: GC

## 2020-07-31 PROCEDURE — 99497 ADVNCD CARE PLAN 30 MIN: CPT | Mod: 25

## 2020-07-31 PROCEDURE — 99232 SBSQ HOSP IP/OBS MODERATE 35: CPT

## 2020-07-31 PROCEDURE — 99233 SBSQ HOSP IP/OBS HIGH 50: CPT | Mod: GC

## 2020-07-31 RX ORDER — ACETAMINOPHEN 500 MG
650 TABLET ORAL EVERY 6 HOURS
Refills: 0 | Status: DISCONTINUED | OUTPATIENT
Start: 2020-07-31 | End: 2020-08-04

## 2020-07-31 RX ADMIN — Medication 650 MILLIGRAM(S): at 12:35

## 2020-07-31 RX ADMIN — Medication 650 MILLIGRAM(S): at 13:35

## 2020-07-31 RX ADMIN — RISPERIDONE 0.5 MILLIGRAM(S): 4 TABLET ORAL at 05:19

## 2020-07-31 NOTE — PROGRESS NOTE ADULT - PROBLEM SELECTOR PLAN 9
F: Replete as needed  E: Per nephrology recs  N: Renal diet    F: tolerating PO  E: replete K<4, Mg<2  N: Dash/TLC    VTE Prophylaxis: Pt refusing AC  GI: not needed  C: DNR/DNI  D: RMF

## 2020-07-31 NOTE — PROGRESS NOTE ADULT - SUBJECTIVE AND OBJECTIVE BOX
Patient is a 74y Female seen and evaluated at bedside. No complaints. Feels okay. BP acceptable. Still refusing hemodialysis       Meds:    acetaminophen   Tablet .. 650 every 6 hours PRN  albumin human 25% IVPB 50 every 1 hour PRN  cefTRIAXone   IVPB 1000 every 24 hours  heparin  Infusion 1500 <Continuous>  QUEtiapine 12.5 every 12 hours PRN  risperiDONE   Tablet 0.5 two times a day      T(C): , Max: 37.3 (07-30-20 @ 20:34)  T(F): , Max: 99.2 (07-30-20 @ 20:34)  HR: 90 (07-31-20 @ 09:15)  BP: 102/73 (07-31-20 @ 06:01)  BP(mean): --  RR: 17 (07-31-20 @ 06:01)  SpO2: 97% (07-31-20 @ 06:01)  Wt(kg): --        Review of Systems:  RESPIRATORY: No shortness of breath  CARDIOVASCULAR: No chest pain or shortness of breath  GASTROINTESTINAL: No abdominal pain         PHYSICAL EXAM:  GENERAL: no acute distress   CHEST/LUNG: Clear to auscultation bilaterally  HEART: normal S1S2, RRR  ABDOMEN: Soft, Nontender   EXTREMITIES: trace edema   NEUROLOGY: A&O x2  ACCESS: R femoral dialysis catheter       LABS:                        10.4   12.01 )-----------( 146      ( 30 Jul 2020 07:56 )             32.8     07-30    133<L>  |  94<L>  |  59<H>  ----------------------------<  105<H>  5.3   |  24  |  5.13<H>    Ca    8.4      30 Jul 2020 07:54  Phos  4.6     07-30  Mg     2.2     07-30        PT/INR - ( 30 Jul 2020 07:55 )   PT: 14.1 sec;   INR: 1.18          PTT - ( 30 Jul 2020 07:55 )  PTT:33.6 sec          RADIOLOGY & ADDITIONAL STUDIES:

## 2020-07-31 NOTE — PROGRESS NOTE ADULT - PROBLEM SELECTOR PLAN 3
Urine Cx grew Klebsiella and E. Coli  -offered CTX 1g IV q24, but pt refusing treatment  -medical ethicist consulted, appreciate recs

## 2020-07-31 NOTE — PROVIDER CONTACT NOTE (OTHER) - SITUATION
Pt refusing heplock placement, rectal temperature and medications. Pt refusing IR procedure. Pt uncooperative regarding NPO status.

## 2020-07-31 NOTE — PROGRESS NOTE ADULT - PROBLEM SELECTOR PLAN 1
Pt required urgent dialysis on admission  Nephrology following closely, recommending further treatment with dialysis although patient refusing.

## 2020-07-31 NOTE — PROGRESS NOTE ADULT - ASSESSMENT
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 Coli)    Multi-disciplinary meeting held today with primary medical team, palliative care, psychiatry, and medical ethics to discuss goals of care with patient. Patient continues to refuse HD, DVT treatment, and antibiotics.

## 2020-07-31 NOTE — PROGRESS NOTE ADULT - SUBJECTIVE AND OBJECTIVE BOX
PRITESH BAL             MRN-[id number]    CC: Altered mental status    HPI:  Ms. Bal is a 73 yo female with a PMHx notable for hemorrhagic CVA 9/2013 with residual left sided weakness and HTN, b/l OA knees, CKD, who was BIBEMS for AMS. She was recently admitted in [place] after fall and discharged to [facility] after renal failure treatment and subsequently released home few days ago. Her doorman called EMS as he had not seen her for past 2 days and she wasn't picking up food left at her door. She was found down in her apartment and was unable to give any information to EMS. Of note, she is unable to give a clear history at time of interview. ROS could not be obtained due to clinical and mental status.     ED Course:  VS: Tmax: 97.2, HR: 48, BP: 117/81 , RR: 16, O2: 100%. When patient was examined in the ED, she was alert, awake but confused, non cooperative shaky.  Labs was notable for leukocytosis, normal lactate, acute renal failure with anion gap acidosis and hyperkalemia cr 9.03, K 7.8 and Bicarb 17, . ECG with peaked t waves. started insulin/glucose, Lokelma, bicarb, calcium iv, lokelma. K corrected to 6.6  ivf administered, zabala placed.  purulent urine noted.  started ceftriaxone.  1L ns given.  also with troponin elevation, Nephrology consulted for urgent ED and vascular surgery consulted for future fistula placement. She was admitted for urgent HD medicine telemetry. (25 Jul 2020 17:15)      SUBJECTIVE: Patient seen with interdisciplinary team including primary team, psych and ethics.   Dyspnea (Carmencita 0-10): 0               N/V (Y/N): N                             Secretions (Y/N) : N               Agitation(Y/N): N  Pain (Y/N): N  http://geriatrictoolkit.[employer].edu/cog/painad.pdf  https://www.aci.health.[place].gov.au/__data/assets/pdf_file/0018/992670/Claudetteey_Pain_Scale_Final.pdf  -Provocation/Palliation:  -Quality/Quantity:  -Radiating:  -Severity:  -Timing/Frequency:  -Impact on ADLs:    General:  Denied  HEENT:    Denied  Neck:  Denied  CVS:  Denied  Resp:  Denied  GI:  Denied Last BM: unknown    :  Denied  Musc:  Denied  Neuro:  Denied  Psych:  Denied  Skin:  Denied  Lymph:  Denied      OTHER REVIEW OF SYSTEMS:  UNABLE TO OBTAIN  due to:    PEx:  T(C): 36.8 (07-31-20 @ 06:01), Max: 37.3 (07-30-20 @ 20:34)  HR: 90 (07-31-20 @ 09:15) (90 - 126)  BP: 102/73 (07-31-20 @ 06:01) (102/73 - 113/75)  RR: 17 (07-31-20 @ 06:01) (17 - 18)  SpO2: 97% (07-31-20 @ 06:01) (95% - 97%)  Wt(kg): --    GENERAL:    HEENT:      	  NECK:           CVS:           	  RESP:        	  GI:             	  Last BM:     :            	  MUSC:       	  NEURO:     	  PSYCH:          SKIN:         	   LYMPH:      	     ALLERGIES: IV Contrast (Anaphylaxis)  Levaquin (Angioedema)  penicillin (Angioedema)      OPIATE NAÏVE (Y/N):    MEDICATIONS: REVIEWED  MEDICATIONS  (STANDING):  cefTRIAXone   IVPB 1000 milliGRAM(s) IV Intermittent every 24 hours  heparin  Infusion 1500 Unit(s)/Hr (15 mL/Hr) IV Continuous <Continuous>  risperiDONE   Tablet 0.5 milliGRAM(s) Oral two times a day    MEDICATIONS  (PRN):  acetaminophen   Tablet .. 650 milliGRAM(s) Oral every 6 hours PRN Temp greater or equal to 38C (100.4F), Mild Pain (1 - 3)  albumin human 25% IVPB 50 milliLiter(s) IV Intermittent every 1 hour PRN to maintain sbp> 100 mmhg intradialysis  QUEtiapine 12.5 milliGRAM(s) Oral every 12 hours PRN Agitation      LABS: REVIEWED  CBC:                        10.4   12.01 )-----------( 146      ( 30 Jul 2020 07:56 )             32.8     CMP:    07-30    133<L>  |  94<L>  |  59<H>  ----------------------------<  105<H>  5.3   |  24  |  5.13<H>    Ca    8.4      30 Jul 2020 07:54  Phos  4.6     07-30  Mg     2.2     07-30    Albumin, Serum: 3.2 g/dL (07-26-20 @ 06:10)      IMAGING: REVIEWED    ADVANCED DIRECTIVES:            FULL CODE            DNR DNI            LIVING WILL            DPOA       HCP       MOLST    DECISION MAKER:   LEGAL SURROGATE:    PSYCHOSOCIAL-SPIRITUAL ASSESSMENT:       Reviewed       Care plan unchanged       Care plan adjusted as above	    GOALS OF CARE DISCUSSION       Palliative care info/counseling provided	           Family meeting       Advanced Directives addressed please see Advance Care Planning Note	           See previous Palliative Medicine Note       Documentation of GOC:    AGENCY CHOICE DISCUSSED:           Homecare        Hospice        [facility]        [facility]        Other:    REFERRALS	        Palliative Med        Unit SW/Case Mgmt              Speech/Swallow       Patient/Family Support       Massage Therapy       Music Therapy       Pet Therapy       Hospice       Nutrition       Dietician       PT/OT PRITESH BAL             MRN-[id number]    CC: Altered mental status    HPI:  Ms. Bal is a 75 yo female with a PMHx notable for hemorrhagic CVA 9/2013 with residual left sided weakness and HTN, b/l OA knees, CKD, who was BIBEMS for AMS. She was recently admitted in [place] after fall and discharged to [facility] after renal failure treatment and subsequently released home few days ago. Her doorman called EMS as he had not seen her for past 2 days and she wasn't picking up food left at her door. She was found down in her apartment and was unable to give any information to EMS. Of note, she is unable to give a clear history at time of interview. ROS could not be obtained due to clinical and mental status.     ED Course:  VS: Tmax: 97.2, HR: 48, BP: 117/81 , RR: 16, O2: 100%. When patient was examined in the ED, she was alert, awake but confused, non cooperative shaky.  Labs was notable for leukocytosis, normal lactate, acute renal failure with anion gap acidosis and hyperkalemia cr 9.03, K 7.8 and Bicarb 17, . ECG with peaked t waves. started insulin/glucose, Lokelma, bicarb, calcium iv, lokelma. K corrected to 6.6  ivf administered, zabala placed.  purulent urine noted.  started ceftriaxone.  1L ns given.  also with troponin elevation, Nephrology consulted for urgent ED and vascular surgery consulted for future fistula placement. She was admitted for urgent HD medicine telemetry. (25 Jul 2020 17:15)      SUBJECTIVE: Pt answers questions minimally. Continuing to refuse medications and treatment.     Dyspnea (Carmencita 0-10): 0               N/V (Y/N): N                             Secretions (Y/N) : N               Agitation(Y/N): N  Pain (Y/N): N  http://geriatrictoolkit.[employer].edu/cog/painad.pdf  https://www.aci.health.nsw.gov.au/__data/assets/pdf_file/0018/585710/Claudetteey_Pain_Scale_Final.pdf  -Provocation/Palliation:  -Quality/Quantity:  -Radiating:  -Severity:  -Timing/Frequency:  -Impact on ADLs:    General:  Denied  HEENT:    Denied  Neck:  Denied  CVS:  Denied  Resp:  Denied  GI:  Denied Last BM: unknown    :  Denied  Musc:  Denied  Neuro:  Denied  Psych:  Denied  Skin:  Denied  Lymph:  Denied      OTHER REVIEW OF SYSTEMS:     PEx:  T(C): 36.8 (07-31-20 @ 06:01), Max: 37.3 (07-30-20 @ 20:34)  HR: 90 (07-31-20 @ 09:15) (90 - 126)  BP: 102/73 (07-31-20 @ 06:01) (102/73 - 113/75)  RR: 17 (07-31-20 @ 06:01) (17 - 18)  SpO2: 97% (07-31-20 @ 06:01) (95% - 97%)  Wt(kg): --    General: lying comfortably in bed, no acute distress  HEENT: temporal wasting  Resp: nonlabored respirations  Musc:  moves all extremeties   Neuro:  oriented to person and place   Skin: Large, right supraclavicular mass    PPSV: 40-50%  http://www.npcrc.org/files/news/karnofsky_performance_scale.pdf   http://www.npcrc.org/files/news/palliative_performance_scale_PPSv2.pdf  Cachexia (Y/N):   BMI: 30.4    ALLERGIES: IV Contrast (Anaphylaxis)  Levaquin (Angioedema)  penicillin (Angioedema)      OPIATE NAÏVE (Y/N): N    MEDICATIONS: REVIEWED  MEDICATIONS  (STANDING):  cefTRIAXone   IVPB 1000 milliGRAM(s) IV Intermittent every 24 hours  heparin  Infusion 1500 Unit(s)/Hr (15 mL/Hr) IV Continuous <Continuous>  risperiDONE   Tablet 0.5 milliGRAM(s) Oral two times a day    MEDICATIONS  (PRN):  acetaminophen   Tablet .. 650 milliGRAM(s) Oral every 6 hours PRN Temp greater or equal to 38C (100.4F), Mild Pain (1 - 3)  albumin human 25% IVPB 50 milliLiter(s) IV Intermittent every 1 hour PRN to maintain sbp> 100 mmhg intradialysis  QUEtiapine 12.5 milliGRAM(s) Oral every 12 hours PRN Agitation      LABS: REVIEWED  CBC:                        10.4   12.01 )-----------( 146      ( 30 Jul 2020 07:56 )             32.8     CMP:    07-30    133<L>  |  94<L>  |  59<H>  ----------------------------<  105<H>  5.3   |  24  |  5.13<H>    Ca    8.4      30 Jul 2020 07:54  Phos  4.6     07-30  Mg     2.2     07-30    Albumin, Serum: 3.2 g/dL (07-26-20 @ 06:10)      IMAGING: REVIEWED    Advanced Directives:     Full Code    Decision maker: Pt demonstrates  Legal surrogate: Nita Hawley (Adult protective services)   	     PSYCHOSOCIAL-SPIRITUAL ASSESSMENT:       Care plan unchanged           GOALS OF CARE DISCUSSION    Multidisciplinary team meeting with primary medicine team, psychiatry and medical ethics. Per our discussion we agree that patient has capacity to refuse hemodialysis and major interventions such as surgery. Attempted to discuss disposition planning with patient. Pt not receptive at this time. Will attempt to have further discussion at later time.          Advance Care Planning Note	           See previous Palliative Medicine Note       Documentation of GOC:    AGENCY CHOICE DISCUSSED:             REFERRALS	        Unit SW/Case Mgmt

## 2020-07-31 NOTE — PROGRESS NOTE ADULT - ASSESSMENT
74F admitted for altered mental status w/concomitant acute renal injury associated with hyperkalemia and uremic encephalopathy and UTI     #anuric KATHARINA on advanced CKD   limited collateral information re baseline kidney function, patient states she has never been on dialysis previously   given limited collateral, uncertain etiology of KATHARINA   CT suggestive of chronic atrophic kidneys / CKD   differential includes ATN ischemic vs toxic, less likely pre-renal or post-renal as history and clinical picture not suggestive and pt remains anuric w/hx of CKD (Cr 2.9 on 07/04/2020) and atrophic kidneys   recovery possible but given advanced kidney disease, likely dialysis dependent   - s/p HD 7/27 for clearance   - appears mental status is @baseline A&Ox2, low concern for renally cleared neurotoxic agents as she has received hemodialysis and remains A&Ox2   - dialysis w/Revaclear to reduce variables contributing to thrombocytopenia (Plt stable for now)   - repeat hemodialysis session was scheduled for 7/29, however patient is still refusing dialysis   - R femoral catheter placed 7/25/2020, IR consult for THC on hold    - unclear if has capacity to refuse dialysis   - continue with strict IOs   - EDW 90 kg   - pending iron panel, PTH, 25 and 1-25 Vit D level  - continue with phoslo 667 mg TID w/meals 74F admitted for altered mental status w/concomitant acute renal injury associated with hyperkalemia and uremic encephalopathy and UTI     #anuric KATHARINA on advanced CKD   limited collateral information re baseline kidney function, patient states she has never been on dialysis previously   given limited collateral, uncertain etiology of KATHARINA   CT suggestive of chronic atrophic kidneys / CKD   differential includes ATN ischemic vs toxic, less likely pre-renal or post-renal as history and clinical picture not suggestive and pt remains anuric w/hx of CKD (Cr 2.9 on 07/04/2020) and atrophic kidneys   recovery possible but given advanced kidney disease, likely dialysis dependent   - s/p HD 7/27 for clearance   - appears mental status is @baseline A&Ox2, low concern for renally cleared neurotoxic agents as she has received hemodialysis and remains A&Ox2   - dialysis w/Revaclear to reduce variables contributing to thrombocytopenia (Plt stable for now)   - repeat hemodialysis session was scheduled for 7/29, however patient is still refusing dialysis   - R femoral catheter placed 7/25/2020, IR consult for THC on hold    - patient w/capacity to refuse dialysis   - recommend repeat labs for K+   - continue with strict IOs   - EDW 90 kg   - pending iron panel, PTH, 25 and 1-25 Vit D level  - continue with phoslo 667 mg TID w/meals

## 2020-07-31 NOTE — PROGRESS NOTE ADULT - PROBLEM SELECTOR PLAN 4
Anuric KATHARINA on advanced CKD. KATHARINA most likely attributed to otc NSAID abuse in the setting of OA (according to records obtained from previous medical stay at [place]). CT suggests chronic atrophic kidneys likely d/t CKD. S/p HD x3.  -Nephro consulted, appreciate recs  -R. femoral cath placed 7/25/20  -offered HD, but PT refusing treatment  -offered placement of Permacath, but pt refusing procedure

## 2020-07-31 NOTE — PROGRESS NOTE ADULT - ATTENDING COMMENTS
Patient seen and examined.  Meds, labs and vitals all reviewed.  Agree with fellow note.  S/p Multidisciplinary team discussion, at bedside between Primary team, Palliative Care, Ethics, and Psychiatry.   After extensive discussion, it is believed that patient has capacity to refuse dialysis, understanding ramification that come along with it, including death. On several occasions, patients mentions that if her kidney stop working, then that is the end and she would be fine with that, mentioning that she does not want dialysis. Also stating, if I die, then I die. During other parts of the conversation, patient did not want to participate in discussion, including discharge planning.  At this point, two physician DNR is appropriate, given that she does not want dialysis and resuscitation at the end of life would only prolong the dying process, and not correct underlying medical issues, which include KATHARINA/ESRD and high suspicious of underlying malignancy. Hospice care at this point would be appropriate. Patient seen and examined.  Meds, labs and vitals all reviewed.  Agree with fellow note.  S/p Multidisciplinary team discussion, at bedside between Primary team, Palliative Care, Ethics, and Psychiatry.   After extensive discussion, it is believed that patient has capacity to refuse dialysis, understanding ramification that come along with it, including death. On several occasions, patients mentions that if her kidney stop working, then that is the end and she would be fine with that, mentioning that she does not want dialysis. Also stating, if I die, then I die. During other parts of the conversation, patient did not want to participate in discussion, including discharge planning.  At this point, two physician DNR is appropriate, given that she does not want dialysis and resuscitation at the end of life would only prolong the dying process, and not correct underlying medical issues, which include KATHARINA/ESRD and high suspicious of underlying malignancy. Hospice care at this point would be appropriate.  Advanced Care Plannin minutes at bedside with patient.

## 2020-07-31 NOTE — PROGRESS NOTE ADULT - SUBJECTIVE AND OBJECTIVE BOX
OVERNIGHT EVENTS:   Pt was tachycardic overnight. Afebrile but refusing rectal temp, antibiotics, and dvt treatment.    SUBJECTIVE:    Patient seen and examined at bedside. Unable to obtain ROS as pt would not cooperate       PHYSICAL EXAM:  Vital Signs Last 24 Hrs  T(C): 36.8 (31 Jul 2020 06:01), Max: 37.3 (30 Jul 2020 20:34)  T(F): 98.2 (31 Jul 2020 06:01), Max: 99.2 (30 Jul 2020 20:34)  HR: 90 (31 Jul 2020 09:15) (90 - 126)  BP: 102/73 (31 Jul 2020 06:01) (102/73 - 112/72)  BP(mean): --  RR: 17 (31 Jul 2020 06:01) (17 - 17)  SpO2: 97% (31 Jul 2020 06:01) (95% - 97%)    General: NAD, laying in bed, alert, interactive  *Pt refused to allow physician to perform remainder of physical exam      MEDICATIONS  (STANDING):  cefTRIAXone   IVPB 1000 milliGRAM(s) IV Intermittent every 24 hours  heparin  Infusion 1500 Unit(s)/Hr (15 mL/Hr) IV Continuous <Continuous>  risperiDONE   Tablet 0.5 milliGRAM(s) Oral two times a day    MEDICATIONS  (PRN):  acetaminophen   Tablet .. 650 milliGRAM(s) Oral every 6 hours PRN Temp greater or equal to 38C (100.4F), Mild Pain (1 - 3)  albumin human 25% IVPB 50 milliLiter(s) IV Intermittent every 1 hour PRN to maintain sbp> 100 mmhg intradialysis  QUEtiapine 12.5 milliGRAM(s) Oral every 12 hours PRN Agitation        Allergies    IV Contrast (Anaphylaxis)  Levaquin (Angioedema)  penicillin (Angioedema)    Intolerances          LABS:                        10.4   12.01 )-----------( 146      ( 30 Jul 2020 07:56 )             32.8     07-30    133<L>  |  94<L>  |  59<H>  ----------------------------<  105<H>  5.3   |  24  |  5.13<H>    Ca    8.4      30 Jul 2020 07:54  Phos  4.6     07-30  Mg     2.2     07-30      PT/INR - ( 30 Jul 2020 07:55 )   PT: 14.1 sec;   INR: 1.18          PTT - ( 30 Jul 2020 07:55 )  PTT:33.6 sec  Fingerstick  glucose:       RADIOLOGY & ADDITIONAL TESTS: Reviewed.

## 2020-07-31 NOTE — PROGRESS NOTE ADULT - PROBLEM SELECTOR PLAN 2
Etiology multifactorial including metabolic encephalopathy from uremia/UTI. underlying psychiatric conditions possible contributing.   Continue management per primary team.

## 2020-07-31 NOTE — PROGRESS NOTE ADULT - ATTENDING COMMENTS
Patient seen and examined at bedside. Agree with exam, a/p as above with the following addendum/edits:     Multidisciplinary discussion today w/ palliative care, ethics, and psychiatry with input from nephrology yesterday. Collectively, we have agreed that the patient has limited capacity to make medical decisions. She is steadfast in her beliefs of not continuing dialysis and also made apparent her views on dying naturally. In accordance with patient's wishes we will perform 2 physician consent for DNR/DNI status (she was unable to continue the conversation). We will also pursue a hospice referral. Patient seen and examined at bedside. Agree with exam, a/p as above with the following addendum/edits:     Multidisciplinary discussion today w/ palliative care, ethics, and psychiatry with input from nephrology yesterday. Collectively, we have agreed that the patient has limited capacity to make medical decisions. She is steadfast in her beliefs of not continuing dialysis and also made apparent her views on dying naturally. We will therefore forego dialysis (at this point she does not need emergent HD anyway) and focus on her comfort (drinking OJ). In accordance with patient's wishes we will perform 2 physician consent for DNR/DNI status (she was unable to continue the conversation). We will also pursue a hospice referral.

## 2020-07-31 NOTE — PROGRESS NOTE ADULT - PROBLEM SELECTOR PLAN 8
Incidental discovery of pelvic lymphadenopathy on CTAP.   -IR consulted for biopsy. Recommend follow up as outpatient for non emergent lymph node biopsy (IR scheduling office [phone]).

## 2020-07-31 NOTE — PROGRESS NOTE ADULT - ASSESSMENT
Ms. 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 She was recently admitted in [place] after fall and discharged to [facility] after renal failure treatment and subsequently released home few days ago. Patient admitted for acute renal failure requiring urgent dialysis. Pt now refusing all treatment.     Palliative care consulted for goals of care.

## 2020-07-31 NOTE — PROGRESS NOTE ADULT - PROBLEM SELECTOR PLAN 2
LLE warm, erythematous, non purulent, and swollen   -offered CTX 1g IV q24, but pt refusing treatment  -medical ethicist consulted, appreciate recs

## 2020-07-31 NOTE — PROGRESS NOTE ADULT - PROBLEM SELECTOR PLAN 1
US duplex venous lower extremity 7/27 showed extensive LLE DVT from common femoral vein to posterior tibial vein.   -offered heparin gtt, but pt refusing treatment  -offered IVC filter, but pt refusing surgery  -medical ethicist consulted, appreciate erasto

## 2020-07-31 NOTE — PROVIDER CONTACT NOTE (OTHER) - ASSESSMENT
Pt disoriented to time. Oriented to self, situation and place. Pt feels warm to touch and diaphoretic. Pulse is 84 by palpation.

## 2020-07-31 NOTE — PROGRESS NOTE ADULT - PROBLEM SELECTOR PLAN 4
Multidisciplinary team meeting today to discuss goals of care as mentioned above. Patient has capacity to refuse hemodialysis although she does not demonstrate full capacity for complex medical decision. As patient is refusing HD and all other medical treatments, pt would benefit from transition to hospice care. Will attempt to discuss disposition plan with patient at a later time.

## 2020-07-31 NOTE — PROVIDER CONTACT NOTE (OTHER) - RECOMMENDATIONS
Continue to reeducate patient on consequences of not taking medications and having rectal temperature checked. Pt states she understands, but is still refusing care.

## 2020-08-01 LAB
CULTURE RESULTS: SIGNIFICANT CHANGE UP
SPECIMEN SOURCE: SIGNIFICANT CHANGE UP

## 2020-08-01 PROCEDURE — 99232 SBSQ HOSP IP/OBS MODERATE 35: CPT | Mod: GC

## 2020-08-01 NOTE — PROGRESS NOTE ADULT - ASSESSMENT
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 Coli)

## 2020-08-01 NOTE — PROGRESS NOTE ADULT - SUBJECTIVE AND OBJECTIVE BOX
INTERVAL HPI/OVERNIGHT EVENTS:  Patient was seen and examined at bedside. Pt answering minimally and did not participate for ROS    VITAL SIGNS:  T(F): 98.5 (08-01-20 @ 02:08)  HR: 89 (08-01-20 @ 05:44)  BP: 102/67 (08-01-20 @ 02:08)  RR: 17 (08-01-20 @ 05:44)  SpO2: 96% (08-01-20 @ 05:44)  Wt(kg): --    PHYSICAL EXAM:    Constitutional: WDWN, NAD  Refused exam, wanted to be left alone    MEDICATIONS  (STANDING):  heparin  Infusion 1500 Unit(s)/Hr (15 mL/Hr) IV Continuous <Continuous>  risperiDONE   Tablet 0.5 milliGRAM(s) Oral two times a day    MEDICATIONS  (PRN):  acetaminophen   Tablet .. 650 milliGRAM(s) Oral every 6 hours PRN Temp greater or equal to 38C (100.4F), Mild Pain (1 - 3)  albumin human 25% IVPB 50 milliLiter(s) IV Intermittent every 1 hour PRN to maintain sbp> 100 mmhg intradialysis  QUEtiapine 12.5 milliGRAM(s) Oral every 12 hours PRN Agitation      Allergies    IV Contrast (Anaphylaxis)  Levaquin (Angioedema)  penicillin (Angioedema)    Intolerances        LABS:                RADIOLOGY & ADDITIONAL TESTS:

## 2020-08-01 NOTE — PROGRESS NOTE ADULT - ATTENDING COMMENTS
I have personally seen, examined, and participated in the care of this patient. I have reviewed all pertinent clinical information including history, physical exam, plan, and the resident's note and agree except as noted    Multidisciplinary discussion yesterday w/ palliative care, ethics, and psychiatry with input from nephrology yesterday. Decision made with team for patient to be DNR/DNI.  Awaiting home hospice

## 2020-08-01 NOTE — PROGRESS NOTE ADULT - PROBLEM SELECTOR PLAN 8
Incidental discovery of pelvic lymphadenopathy on CTAP.   -IR consulted for biopsy. Recommend follow up as outpatient for non emergent lymph node biopsy (IR scheduling office [phone]).

## 2020-08-01 NOTE — PROGRESS NOTE ADULT - PROBLEM SELECTOR PLAN 9
F: tolerating PO  E: replete K<4, Mg<2  N: Dash/TLC    VTE Prophylaxis: Pt refusing AC  GI: not needed  C: DNR/DNI  D: RMF

## 2020-08-01 NOTE — PROGRESS NOTE ADULT - PROBLEM SELECTOR PLAN 4
Anuric KATHARINA on advanced CKD. KATHARINA most likely attributed to otc NSAID abuse in the setting of OA (according to records obtained from previous medical stay at [facility]). CT suggests chronic atrophic kidneys likely d/t CKD. S/p HD x3.  -Nephro consulted, appreciate recs  -R. femoral cath placed 7/25/20  -offered HD, but PT refusing treatment  -offered placement of Permacath, but pt refusing procedure

## 2020-08-02 PROCEDURE — 99232 SBSQ HOSP IP/OBS MODERATE 35: CPT | Mod: GC

## 2020-08-02 NOTE — PROGRESS NOTE ADULT - PROBLEM SELECTOR PLAN 8
Incidental discovery of pelvic lymphadenopathy on CTAP.   -IR consulted for biopsy. Recommend follow up as outpatient for non emergent lymph node biopsy (IR scheduling office [phone]).

## 2020-08-02 NOTE — PROGRESS NOTE ADULT - PROBLEM SELECTOR PLAN 4
Anuric KATHARINA on advanced CKD. KATHARINA most likely attributed to otc NSAID abuse in the setting of OA (according to records obtained from previous medical stay at [facility]). CT suggests chronic atrophic kidneys likely d/t CKD. S/p HD x3.  -Nephro consulted, appreciate recs  -R. femoral cath placed 7/25/20  -offered HD, but PT refusing treatment  -offered placement of Permacath, but pt refusing procedure

## 2020-08-02 NOTE — PROGRESS NOTE ADULT - ATTENDING COMMENTS
I have personally seen, examined, and participated in the care of this patient. 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 Coli). Now refusing any intervention. For home hospice. Agree with plan as above

## 2020-08-02 NOTE — PROGRESS NOTE ADULT - SUBJECTIVE AND OBJECTIVE BOX
INTERVAL HPI/OVERNIGHT EVENTS:      On further ROS, patient did not have complaint of: Headache, Blurred Vision, Cough, Dyspnea, Palpitations, Abdominal Pain, N/V, Weakness, Change in Sensation.     VITAL SIGNS:  T(F): --  HR: --  BP: --  RR: --  SpO2: --  Wt(kg): --    Input & Output:      PHYSICAL EXAM:  Constitutional: Patient seated comfortably in bed, of appropriate color, nutrition, and hydration.   HEENT: PERRLA, Normal Range of eye movement with no complaint of diplopia, Normal Hearing  Neck: No LAD, No JVD  Back: Normal spine flexure, No CVA tenderness  Respiratory: Normal air entry, Lungs clear to auscultation b/l w/o wheeze or crepitations.   Cardiovascular: S1 and S2 present - no additional abnormal sounds or murmurs. Normal rhythm and rate of pulse.   Gastrointestinal: BS+, soft, NT/ND  Extremities: No peripheral edema  Vascular: 2+ peripheral pulses  Neurological: A/O x 3, CN V-XII grossly intact.  Psychiatric: Normal mood, normal affect  Musculoskeletal: 5/5 strength b/l upper and lower extremities  Skin: No rashes    MEDICATIONS  (STANDING):  heparin  Infusion 1500 Unit(s)/Hr (15 mL/Hr) IV Continuous <Continuous>  risperiDONE   Tablet 0.5 milliGRAM(s) Oral two times a day    MEDICATIONS  (PRN):  acetaminophen   Tablet .. 650 milliGRAM(s) Oral every 6 hours PRN Temp greater or equal to 38C (100.4F), Mild Pain (1 - 3)  albumin human 25% IVPB 50 milliLiter(s) IV Intermittent every 1 hour PRN to maintain sbp> 100 mmhg intradialysis  QUEtiapine 12.5 milliGRAM(s) Oral every 12 hours PRN Agitation      Allergies    IV Contrast (Anaphylaxis)  Levaquin (Angioedema)  penicillin (Angioedema)    Intolerances        LABS:                RADIOLOGY & ADDITIONAL TESTS: INTERVAL HPI/OVERNIGHT EVENTS:  No overnight events. Patient declined vitals this AM. No complaints this AM.    On further ROS, patient did not have complaint of: Headache, Blurred Vision, Cough, Dyspnea, Palpitations, Abdominal Pain, N/V, Weakness, Change in Sensation.     Patient declined Vital signs.    Input & Output:      PHYSICAL EXAM:  Constitutional: Patient laying in bed, reluctantly participated in exam this AM   HEENT: PERRLA, Normal Range of eye movement.  Respiratory: Minimal effort for exam. No wheezes or crackles appreciable.   Cardiovascular: RRR  Gastrointestinal: BS+, soft, NT/ND  Extremities: b/l LE swelling, could not perform pitting assessment  Vascular: d/p pulses not appreciable, patient declined remainder LE exam, 2+ radial b/l pulses  Neurological: A/O x 3, CN V-XII grossly intact.  Musculoskeletal: 5/5 strength b/l upper, LE not assessed due to patient not participating  Skin: Circumferential scaling and discoloration b/l LE L>R    MEDICATIONS  (STANDING):  heparin  Infusion 1500 Unit(s)/Hr (15 mL/Hr) IV Continuous <Continuous>  risperiDONE   Tablet 0.5 milliGRAM(s) Oral two times a day    MEDICATIONS  (PRN):  acetaminophen   Tablet .. 650 milliGRAM(s) Oral every 6 hours PRN Temp greater or equal to 38C (100.4F), Mild Pain (1 - 3)  albumin human 25% IVPB 50 milliLiter(s) IV Intermittent every 1 hour PRN to maintain sbp> 100 mmhg intradialysis  QUEtiapine 12.5 milliGRAM(s) Oral every 12 hours PRN Agitation      Allergies    IV Contrast (Anaphylaxis)  Levaquin (Angioedema)  penicillin (Angioedema)    Intolerances        LABS:                RADIOLOGY & ADDITIONAL TESTS:

## 2020-08-02 NOTE — PROGRESS NOTE ADULT - ASSESSMENT
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 Coli) Multidisciplinary discussion 7/31 w/ palliative care, ethics, and psychiatry with input from nephrology. Decision made with team for patient to be DNR/DNI. Awaiting home hospice.

## 2020-08-03 VITALS
SYSTOLIC BLOOD PRESSURE: 107 MMHG | RESPIRATION RATE: 18 BRPM | DIASTOLIC BLOOD PRESSURE: 67 MMHG | OXYGEN SATURATION: 94 % | HEART RATE: 85 BPM | TEMPERATURE: 99 F

## 2020-08-03 PROCEDURE — 99232 SBSQ HOSP IP/OBS MODERATE 35: CPT | Mod: GC

## 2020-08-03 PROCEDURE — 99231 SBSQ HOSP IP/OBS SF/LOW 25: CPT

## 2020-08-03 PROCEDURE — 99233 SBSQ HOSP IP/OBS HIGH 50: CPT

## 2020-08-03 RX ADMIN — Medication 650 MILLIGRAM(S): at 08:46

## 2020-08-03 RX ADMIN — Medication 650 MILLIGRAM(S): at 07:51

## 2020-08-03 NOTE — PROGRESS NOTE ADULT - SUBJECTIVE AND OBJECTIVE BOX
OVERNIGHT EVENTS:   No acute events overnight.     SUBJECTIVE:    Patient seen and examined at bedside. Pt state she is "feeling lousy" but would not elaborate upon further questioning. Unable to perform full ROS as patient uncooperative with exam. Pt kept requesting orange juice and would not answer questions.      PHYSICAL EXAM:  Vital Signs Last 24 Hrs  T(C): 37.3 (03 Aug 2020 06:00), Max: 37.8 (02 Aug 2020 21:13)  T(F): 99.2 (03 Aug 2020 06:00), Max: 100 (02 Aug 2020 21:13)  HR: 92 (03 Aug 2020 06:00) (78 - 92)  BP: 111/73 (03 Aug 2020 06:00) (111/73 - 133/80)  BP(mean): --  RR: 16 (03 Aug 2020 06:00) (16 - 20)  SpO2: 95% (03 Aug 2020 06:00) (95% - 96%)    General: NAD, laying in bed, alert,  HEENT: large right supraclavicular soft mass present, non tender, no scleral icterus, no JVD, no thyromegaly  Resp: CTA b/l, no w/r/r  Skin: Circumferential scaling and discoloration b/l LE L>R  *Pt refused to allow physician to perform remainder of physical exam        MEDICATIONS  (STANDING):  heparin  Infusion 1500 Unit(s)/Hr (15 mL/Hr) IV Continuous <Continuous>  risperiDONE   Tablet 0.5 milliGRAM(s) Oral two times a day    MEDICATIONS  (PRN):  acetaminophen   Tablet .. 650 milliGRAM(s) Oral every 6 hours PRN Temp greater or equal to 38C (100.4F), Mild Pain (1 - 3)  albumin human 25% IVPB 50 milliLiter(s) IV Intermittent every 1 hour PRN to maintain sbp> 100 mmhg intradialysis  QUEtiapine 12.5 milliGRAM(s) Oral every 12 hours PRN Agitation        Allergies    IV Contrast (Anaphylaxis)  Levaquin (Angioedema)  penicillin (Angioedema)    Intolerances          LABS:            Fingerstick  glucose:       RADIOLOGY & ADDITIONAL TESTS: Reviewed.

## 2020-08-03 NOTE — PROGRESS NOTE ADULT - SUBJECTIVE AND OBJECTIVE BOX
PRITESH BAL             MRN-[id number]    CC: Altered mental status    HPI:  Ms. Bal is a 73 yo female with a PMHx notable for hemorrhagic CVA 9/2013 with residual left sided weakness and HTN, b/l OA knees, CKD, who was BIBEMS for AMS. She was recently admitted in [place] after fall and discharged to [facility] after renal failure treatment and subsequently released home few days ago. Her doorman called EMS as he had not seen her for past 2 days and she wasn't picking up food left at her door. She was found down in her apartment and was unable to give any information to EMS. Of note, she is unable to give a clear history at time of interview. ROS could not be obtained due to clinical and mental status.     ED Course:  VS: Tmax: 97.2, HR: 48, BP: 117/81 , RR: 16, O2: 100%. When patient was examined in the ED, she was alert, awake but confused, non cooperative shaky.  Labs was notable for leukocytosis, normal lactate, acute renal failure with anion gap acidosis and hyperkalemia cr 9.03, K 7.8 and Bicarb 17, . ECG with peaked t waves. started insulin/glucose, Lokelma, bicarb, calcium iv, lokelma. K corrected to 6.6  ivf administered, zabala placed.  purulent urine noted.  started ceftriaxone.  1L ns given.  also with troponin elevation, Nephrology consulted for urgent ED and vascular surgery consulted for future fistula placement. She was admitted for urgent HD medicine telemetry. (25 Jul 2020 17:15)      SUBJECTIVE: Pt appears more lethargic. Arousable but not engaging in conversation.     Dyspnea (Carmencita 0-10): 0               N/V (Y/N): N                             Secretions (Y/N) : N               Agitation(Y/N): N  Pain (Y/N): N  http://geriatrictoolkit.[place].[facility]/cog/painad.pdf  https://www.aci.health.[place].gov.au/__data/assets/pdf_file/0018/075386/[place]_Pain_Scale_Final.pdf  -Provocation/Palliation:  -Quality/Quantity:  -Radiating:  -Severity:  -Timing/Frequency:  -Impact on ADLs:    ROS: Unable to obtain due to cognitive impairment, lethargic    PEx:  T(C): 36.8 (07-31-20 @ 06:01), Max: 37.3 (07-30-20 @ 20:34)  HR: 90 (07-31-20 @ 09:15) (90 - 126)  BP: 102/73 (07-31-20 @ 06:01) (102/73 - 113/75)  RR: 17 (07-31-20 @ 06:01) (17 - 18)  SpO2: 97% (07-31-20 @ 06:01) (95% - 97%)    General: lethargic, no acute distress  HEENT: temporal wasting  CVS: RRR, +S1/S2  Resp: nonlabored respirations  Skin: Large, right supraclavicular mass    PPSV: 40-50%  http://www.[facility].org/files/news/karnofsky_performance_scale.pdf   http://www.[facility].org/files/news/palliative_performance_scale_PPSv2.pdf  Cachexia (Y/N): N  BMI: 30.4    ALLERGIES: IV Contrast (Anaphylaxis)  Levaquin (Angioedema)  penicillin (Angioedema)      OPIATE NAÏVE (Y/N): N    MEDICATIONS: REVIEWED  MEDICATIONS  (STANDING):  cefTRIAXone   IVPB 1000 milliGRAM(s) IV Intermittent every 24 hours  heparin  Infusion 1500 Unit(s)/Hr (15 mL/Hr) IV Continuous <Continuous>  risperiDONE   Tablet 0.5 milliGRAM(s) Oral two times a day    MEDICATIONS  (PRN):  acetaminophen   Tablet .. 650 milliGRAM(s) Oral every 6 hours PRN Temp greater or equal to 38C (100.4F), Mild Pain (1 - 3)  albumin human 25% IVPB 50 milliLiter(s) IV Intermittent every 1 hour PRN to maintain sbp> 100 mmhg intradialysis  QUEtiapine 12.5 milliGRAM(s) Oral every 12 hours PRN Agitation      LABS: REVIEWED  CBC:                        10.4   12.01 )-----------( 146      ( 30 Jul 2020 07:56 )             32.8     CMP:    07-30    133<L>  |  94<L>  |  59<H>  ----------------------------<  105<H>  5.3   |  24  |  5.13<H>    Ca    8.4      30 Jul 2020 07:54  Phos  4.6     07-30  Mg     2.2     07-30    Albumin, Serum: 3.2 g/dL (07-26-20 @ 06:10)      IMAGING: REVIEWED    Advanced Directives:     Full Code    Decision maker: Pt demonstrates  Legal surrogate: Nita Hawley (Adult protective services)   	     PSYCHOSOCIAL-SPIRITUAL ASSESSMENT:       Care plan unchanged           GOALS OF CARE DISCUSSION    7/31: Multidisciplinary team meeting with primary medicine team, psychiatry and medical ethics. Per our discussion we agree that patient has capacity to refuse hemodialysis and major interventions such as surgery. Attempted to discuss disposition planning with patient. Pt not receptive at this time. Will attempt to have further discussion at later time.          Advance Care Planning Note	           See previous Palliative Medicine Note       Documentation of GOC:    AGENCY CHOICE DISCUSSED:             REFERRALS	        Unit SW/Case Mgmt       Palliative SW

## 2020-08-03 NOTE — PROGRESS NOTE BEHAVIORAL HEALTH - NSBHFUPINTERVALCCFT_PSY_A_CORE
"Can I have some ice?"
"I don't remember."
"It's a horrible situation."
"Why are you doing this to me?"

## 2020-08-03 NOTE — PROGRESS NOTE ADULT - PROBLEM SELECTOR PLAN 2
Etiology multifactorial including metabolic encephalopathy from uremia/UTI. underlying psychiatric conditions possible contributing.   Psych following  Patient refusing all treatments/medications

## 2020-08-03 NOTE — PROGRESS NOTE BEHAVIORAL HEALTH - RISK ASSESSMENT
No hx of attempt and no current SI  Current Risk: UNLIKELY  Current Risk will be mitigated by providing pt with a safe and supportive environment.

## 2020-08-03 NOTE — PROGRESS NOTE ADULT - PROBLEM SELECTOR PLAN 4
Anuric KATHARINA on advanced CKD. KATHARINA most likely attributed to otc NSAID abuse in the setting of OA (according to records obtained from previous medical stay at [place]). CT suggests chronic atrophic kidneys likely d/t CKD. S/p HD x3.  -Nephro consulted, appreciate recs  -R. femoral cath placed 7/25/20  -offered HD, but PT refusing treatment  -offered placement of Permacath, but pt refusing procedure

## 2020-08-03 NOTE — PROGRESS NOTE ADULT - ASSESSMENT
Ms. 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 She was recently admitted in [place] after fall and discharged to [facility] after renal failure treatment and subsequently released home few days ago. Patient admitted for acute renal failure requiring urgent dialysis. Pt now refusing all treatment.     Palliative care consulted for goals of care.

## 2020-08-03 NOTE — PROGRESS NOTE ADULT - PROBLEM SELECTOR PLAN 1
Pt required urgent dialysis on admission  Patient refusing dialysis  No signs of fluid overload, although pt appears more lethargic today

## 2020-08-03 NOTE — PROGRESS NOTE ADULT - ASSESSMENT
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 Coli) Multidisciplinary discussion 7/31 w/ palliative care, ethics, and psychiatry with input from nephrology. Decision made with team for patient to be DNR/DNI. Awaiting home hospice.

## 2020-08-03 NOTE — PROGRESS NOTE ADULT - PROBLEM SELECTOR PLAN 4
Multidisciplinary team meeting on 7/31. Team in agreement that patient has capacity to refuse dialysis.  MOLST completed, 2 physician DNR/DNI.  Hospice care appropriate. Referral made to [facility], will f/u with LUIS.

## 2020-08-03 NOTE — PROGRESS NOTE ADULT - PROBLEM SELECTOR PLAN 8
Incidental discovery of pelvic lymphadenopathy on CTAP.   -IR consulted for biopsy. Recommend follow up as outpatient for non emergent lymph node biopsy (IR scheduling office [phone]).

## 2020-08-03 NOTE — PROGRESS NOTE BEHAVIORAL HEALTH - NSBHCHARTREVIEWVS_PSY_A_CORE FT
Vital Signs Last 24 Hrs  T(C): 37.1 (30 Jul 2020 05:33), Max: 37.1 (30 Jul 2020 05:33)  T(F): 98.7 (30 Jul 2020 05:33), Max: 98.7 (30 Jul 2020 05:33)  HR: 93 (30 Jul 2020 05:33) (93 - 97)  BP: 118/81 (30 Jul 2020 05:33) (115/79 - 128/86)  BP(mean): --  RR: 17 (30 Jul 2020 05:33) (17 - 18)  SpO2: 96% (30 Jul 2020 05:33) (96% - 96%)
Vital Signs Last 24 Hrs  T(C): 36.8 (31 Jul 2020 06:01), Max: 37.3 (30 Jul 2020 20:34)  T(F): 98.2 (31 Jul 2020 06:01), Max: 99.2 (30 Jul 2020 20:34)  HR: 90 (31 Jul 2020 09:15) (90 - 126)  BP: 102/73 (31 Jul 2020 06:01) (102/73 - 113/75)  BP(mean): --  RR: 17 (31 Jul 2020 06:01) (17 - 18)  SpO2: 97% (31 Jul 2020 06:01) (95% - 97%)
Vital Signs Last 24 Hrs  T(C): 36.8 (31 Jul 2020 06:01), Max: 37.3 (30 Jul 2020 20:34)  T(F): 98.2 (31 Jul 2020 06:01), Max: 99.2 (30 Jul 2020 20:34)  HR: 90 (31 Jul 2020 09:15) (90 - 126)  BP: 102/73 (31 Jul 2020 06:01) (102/73 - 113/75)  BP(mean): --  RR: 17 (31 Jul 2020 06:01) (17 - 18)  SpO2: 97% (31 Jul 2020 06:01) (95% - 97%)
Vital Signs Last 24 Hrs  T(C): 37 (28 Jul 2020 12:44), Max: 37.4 (28 Jul 2020 05:59)  T(F): 98.6 (28 Jul 2020 12:44), Max: 99.3 (28 Jul 2020 05:59)  HR: 92 (28 Jul 2020 12:44) (67 - 100)  BP: 114/65 (28 Jul 2020 12:44) (114/65 - 140/83)  BP(mean): 93 (27 Jul 2020 13:23) (93 - 93)  RR: 20 (28 Jul 2020 12:44) (16 - 20)  SpO2: 95% (28 Jul 2020 12:44) (94% - 98%)

## 2020-08-03 NOTE — PROGRESS NOTE BEHAVIORAL HEALTH - SUMMARY
74CF, no known past psychiatric hx though recently prescribed Risperdal 0.5mg BID, PMHx notable for hemorrhagic CVA 9/2013 with residual left sided weakness and HTN, b/l OA knees, CKD, who was BIBEMS for AMS; Psychiatry consulted for medication management.  No dangerousness evident. Pt. does not currently represent a danger to herself or others and does not meet criteria for Involuntary Psychiatric Hospitalization.  It appears pt was prescribed Risperdal 0.5mg BID while admitted to most recent rehab facility, likely for agitation though in the setting of pt not wishing to remain in said facility; she has now gone several days without this medication and there has been no agitation or development of acute psychiatric symptoms. Recommend to continue with quetiapine 12.5mg BID prn agitation if necessary.
74CF, pph paranoia and delusional thinking - restarted on Risperdal 0.5mg BID 7/30/20, PMHx notable for hemorrhagic CVA 9/2013 with residual left sided weakness and HTN, b/l OA knees, CKD, who was BIBEMS for AMS; Psychiatry consulted for medication management.  No imminent dangerousness evident. Pt. does not currently represent a danger to herself or others and does not meet criteria for Involuntary Psychiatric Hospitalization.  No new psychiatric interventions recommended at this time, c/w Risperdal 0.5mg BID.
74CF, pph paranoia and delusional thinking, recently prescribed Risperdal 0.5mg BID, PMHx notable for hemorrhagic CVA 9/2013 with residual left sided weakness and HTN, b/l OA knees, CKD, who was BIBEMS for AMS; Psychiatry consulted for medication management.  No imminent dangerousness evident. Pt. does not currently represent a danger to herself or others and does not meet criteria for Involuntary Psychiatric Hospitalization.  Team in agreement that pt does not have Capacity to leave AMA, as she has not communicated her options or an appreciation of the consequences. Team does however agree that pt has the Capacity to refuse dialysis, as she understands the relevant information, communicates her choice in the matter, and appreciates the situation and its consequences.
74CF, pph paranoia and delusional thinking, recently prescribed Risperdal 0.5mg BID, PMHx notable for hemorrhagic CVA 9/2013 with residual left sided weakness and HTN, b/l OA knees, CKD, who was BIBEMS for AMS; Psychiatry consulted for medication management.  No dangerousness evident. Pt. does not currently represent a danger to herself or others and does not meet criteria for Involuntary Psychiatric Hospitalization.  Pt. with previous good tolerance to Risperdal, and evidencing a worsening mental state since its discontinuation several days ago, recommend to restart Risperdal 0.5mg BID.

## 2020-08-03 NOTE — PROGRESS NOTE BEHAVIORAL HEALTH - NSBHFUPINTERVALHXFT_PSY_A_CORE
Pt. seen, chart reviewed, as per Nursing Report pt has not required prn medication for any episodes of agitation today and refused risperidone this morning. Pt. seen individually, she is lying in bed, dressed in hospital gown, resting comfortably. Pt. reports "I don't remember" when asked how she slept lat night, and when asked why she refused risperidone pt seemed confused as if she did not remember refusing the medication.  Pt. states her mood has been "lousy," and her energy has been "not good." Pt. denies SI/HI/AH/VH.
Pt. seen, chart reviewed, case discussed with the Interdisciplinary Treatment Team. As per Nursing Report pt has been refusing some medications, accepting risperidone, without episodes of acute agitation requiring prn medication, and continues to refuse dialysis. Pt. seen individually and again with Treatment Team consisting of Primary, Palliative, and Ethics. When was seen individually, she was lying in bed, dressed in hospital gown, resting comfortably. Pt. again minimally cooperative with evaluation, refusing to answer most questions. Pt. does clearly communicate what the repercussions are of refusing dialysis, as well as the purpose of the treatment.      Interdisciplinary Team meeting held prior to team meeting with patient; plan of care discussed. Team in agreement that pt has Capacity to refuse dialysis as well as major invasive procedures including surgery.  Team met with pt after meeting, and pt did not at this time participate in discharge planning.
Pt. seen, chart reviewed. As per Nursing Report pt has been cooperative with no episodes of agitation; pt has not required prn medication for agitation this admission. Pt. seen individually, she is lying in bed, holding a cup filled with ice chips, resting comfortably. Pt. reports no changes in mood, energy, sleep, appetite, or concentration. Pt. denies SI/HI/AH/VH.
Pt. seen, chart reviewed, case discussed with Primary Team. Pt. continues with no episodes of acute agitation requiring prn medication. Pt. seen individually, she is lying in bed, dressed in hospital gown, resting comfortably. Pt. minimally cooperative with evaluation today, refusing to answer most questions and repeating "why are you doing this to me." As per chart review pt with previous hx of paranoid and delusional sx. Pt. replies "August" when asked what month is and sarcastically responds "a library" when asked what type of building she is in, later indeed clarifying she was joking. Pt. refused to answer additional orientation questions. She reports to recognize the name "risperidone" but states she does not recall when it was initiated or for what reason.

## 2020-08-03 NOTE — PROGRESS NOTE BEHAVIORAL HEALTH - NSBHCONSULTRECOMMENDOTHER_PSY_A_CORE FT
1) Level of Observation as per Primary Team  2) Restart Risperdal 0.5mg BID  3) c/w quetiapine 12.5mg BID prn agitation  4) Psychiatry will follow this admission however please call for any acute psychiatric needs  5) No psychiatric contraindications to discharge,
1) Level of Observation as per Primary Team  2) c/w quetiapine 12.5mg BID prn agitation  3) Psychiatry will follow this admission however please call for any acute psychiatric needs  4) No psychiatric contraindications to discharge,
1) Level of Observation as per Primary Team  2) c/w Risperdal 0.5mg BID  3) c/w quetiapine 12.5mg BID prn agitation  4) Psychiatry will follow this admission however please call for any acute psychiatric needs  5) No psychiatric contraindications to discharge,
1) Level of Observation as per Primary Team  2) c/w Risperdal 0.5mg BID  3) c/w quetiapine 12.5mg BID prn agitation  4) Psychiatry will follow this admission however please call for any acute psychiatric needs  5) No psychiatric contraindications to discharge,

## 2020-08-04 ENCOUNTER — TRANSCRIPTION ENCOUNTER (OUTPATIENT)
Age: 75
End: 2020-08-04

## 2020-08-04 PROCEDURE — 85027 COMPLETE CBC AUTOMATED: CPT

## 2020-08-04 PROCEDURE — 95714 VEEG EA 12-26 HR UNMNTR: CPT

## 2020-08-04 PROCEDURE — 87086 URINE CULTURE/COLONY COUNT: CPT

## 2020-08-04 PROCEDURE — 99285 EMERGENCY DEPT VISIT HI MDM: CPT | Mod: 25

## 2020-08-04 PROCEDURE — 87040 BLOOD CULTURE FOR BACTERIA: CPT

## 2020-08-04 PROCEDURE — 84100 ASSAY OF PHOSPHORUS: CPT

## 2020-08-04 PROCEDURE — 87150 DNA/RNA AMPLIFIED PROBE: CPT

## 2020-08-04 PROCEDURE — 84550 ASSAY OF BLOOD/URIC ACID: CPT

## 2020-08-04 PROCEDURE — 81001 URINALYSIS AUTO W/SCOPE: CPT

## 2020-08-04 PROCEDURE — 82553 CREATINE MB FRACTION: CPT

## 2020-08-04 PROCEDURE — 85730 THROMBOPLASTIN TIME PARTIAL: CPT

## 2020-08-04 PROCEDURE — 51702 INSERT TEMP BLADDER CATH: CPT

## 2020-08-04 PROCEDURE — P9047: CPT

## 2020-08-04 PROCEDURE — 86901 BLOOD TYPING SEROLOGIC RH(D): CPT

## 2020-08-04 PROCEDURE — 84145 PROCALCITONIN (PCT): CPT

## 2020-08-04 PROCEDURE — 86803 HEPATITIS C AB TEST: CPT

## 2020-08-04 PROCEDURE — 84443 ASSAY THYROID STIM HORMONE: CPT

## 2020-08-04 PROCEDURE — 99239 HOSP IP/OBS DSCHRG MGMT >30: CPT

## 2020-08-04 PROCEDURE — 92610 EVALUATE SWALLOWING FUNCTION: CPT

## 2020-08-04 PROCEDURE — 86850 RBC ANTIBODY SCREEN: CPT

## 2020-08-04 PROCEDURE — 85610 PROTHROMBIN TIME: CPT

## 2020-08-04 PROCEDURE — 87635 SARS-COV-2 COVID-19 AMP PRB: CPT

## 2020-08-04 PROCEDURE — 99233 SBSQ HOSP IP/OBS HIGH 50: CPT

## 2020-08-04 PROCEDURE — 82330 ASSAY OF CALCIUM: CPT

## 2020-08-04 PROCEDURE — 95700 EEG CONT REC W/VID EEG TECH: CPT

## 2020-08-04 PROCEDURE — 71045 X-RAY EXAM CHEST 1 VIEW: CPT

## 2020-08-04 PROCEDURE — 92526 ORAL FUNCTION THERAPY: CPT

## 2020-08-04 PROCEDURE — 83605 ASSAY OF LACTIC ACID: CPT

## 2020-08-04 PROCEDURE — 84295 ASSAY OF SERUM SODIUM: CPT

## 2020-08-04 PROCEDURE — 83735 ASSAY OF MAGNESIUM: CPT

## 2020-08-04 PROCEDURE — 36415 COLL VENOUS BLD VENIPUNCTURE: CPT

## 2020-08-04 PROCEDURE — 80053 COMPREHEN METABOLIC PANEL: CPT

## 2020-08-04 PROCEDURE — 84132 ASSAY OF SERUM POTASSIUM: CPT

## 2020-08-04 PROCEDURE — 96375 TX/PRO/DX INJ NEW DRUG ADDON: CPT | Mod: XU

## 2020-08-04 PROCEDURE — 85025 COMPLETE CBC W/AUTO DIFF WBC: CPT

## 2020-08-04 PROCEDURE — 84484 ASSAY OF TROPONIN QUANT: CPT

## 2020-08-04 PROCEDURE — 74176 CT ABD & PELVIS W/O CONTRAST: CPT

## 2020-08-04 PROCEDURE — 96374 THER/PROPH/DIAG INJ IV PUSH: CPT | Mod: XU

## 2020-08-04 PROCEDURE — 82803 BLOOD GASES ANY COMBINATION: CPT

## 2020-08-04 PROCEDURE — 90935 HEMODIALYSIS ONE EVALUATION: CPT

## 2020-08-04 PROCEDURE — 86140 C-REACTIVE PROTEIN: CPT

## 2020-08-04 PROCEDURE — 80048 BASIC METABOLIC PNL TOTAL CA: CPT

## 2020-08-04 PROCEDURE — 82962 GLUCOSE BLOOD TEST: CPT

## 2020-08-04 PROCEDURE — 85045 AUTOMATED RETICULOCYTE COUNT: CPT

## 2020-08-04 PROCEDURE — 87340 HEPATITIS B SURFACE AG IA: CPT

## 2020-08-04 PROCEDURE — 83615 LACTATE (LD) (LDH) ENZYME: CPT

## 2020-08-04 PROCEDURE — 82533 TOTAL CORTISOL: CPT

## 2020-08-04 PROCEDURE — 86706 HEP B SURFACE ANTIBODY: CPT

## 2020-08-04 PROCEDURE — 82550 ASSAY OF CK (CPK): CPT

## 2020-08-04 PROCEDURE — 70450 CT HEAD/BRAIN W/O DYE: CPT

## 2020-08-04 PROCEDURE — 87186 SC STD MICRODIL/AGAR DIL: CPT

## 2020-08-04 PROCEDURE — 93970 EXTREMITY STUDY: CPT

## 2020-08-04 RX ORDER — QUETIAPINE FUMARATE 200 MG/1
12.5 TABLET, FILM COATED ORAL
Qty: 0 | Refills: 0 | DISCHARGE
Start: 2020-08-04

## 2020-08-04 RX ORDER — RISPERIDONE 4 MG/1
1 TABLET ORAL
Qty: 0 | Refills: 0 | DISCHARGE
Start: 2020-08-04

## 2020-08-04 RX ORDER — CEFPODOXIME PROXETIL 100 MG
400 TABLET ORAL EVERY 24 HOURS
Refills: 0 | Status: DISCONTINUED | OUTPATIENT
Start: 2020-08-04 | End: 2020-08-04

## 2020-08-04 RX ORDER — OXYCODONE AND ACETAMINOPHEN 5; 325 MG/1; MG/1
1 TABLET ORAL ONCE
Refills: 0 | Status: DISCONTINUED | OUTPATIENT
Start: 2020-08-04 | End: 2020-08-04

## 2020-08-04 RX ORDER — OXYCODONE HYDROCHLORIDE 5 MG/1
5 TABLET ORAL ONCE
Refills: 0 | Status: DISCONTINUED | OUTPATIENT
Start: 2020-08-04 | End: 2020-08-04

## 2020-08-04 RX ADMIN — Medication 650 MILLIGRAM(S): at 01:09

## 2020-08-04 RX ADMIN — OXYCODONE AND ACETAMINOPHEN 1 TABLET(S): 5; 325 TABLET ORAL at 11:51

## 2020-08-04 RX ADMIN — Medication 2 MILLIGRAM(S): at 15:32

## 2020-08-04 RX ADMIN — OXYCODONE AND ACETAMINOPHEN 1 TABLET(S): 5; 325 TABLET ORAL at 12:55

## 2020-08-04 RX ADMIN — Medication 650 MILLIGRAM(S): at 02:09

## 2020-08-04 RX ADMIN — Medication 650 MILLIGRAM(S): at 07:37

## 2020-08-04 RX ADMIN — Medication 650 MILLIGRAM(S): at 06:37

## 2020-08-04 RX ADMIN — OXYCODONE HYDROCHLORIDE 5 MILLIGRAM(S): 5 TABLET ORAL at 02:56

## 2020-08-04 RX ADMIN — RISPERIDONE 0.5 MILLIGRAM(S): 4 TABLET ORAL at 05:44

## 2020-08-04 RX ADMIN — OXYCODONE HYDROCHLORIDE 5 MILLIGRAM(S): 5 TABLET ORAL at 04:00

## 2020-08-04 NOTE — PROGRESS NOTE ADULT - PROBLEM SELECTOR PLAN 3
Urine Cx grew Klebsiella and E. Coli  -offered CTX 1g IV q24, but pt refusing treatment  -Cefpodoxime 400mg PO q24hr for cellulitis may provide coverage  -medical ethicist consulted, appreciate recs

## 2020-08-04 NOTE — DISCHARGE NOTE PROVIDER - NSDCMRMEDTOKEN_GEN_ALL_CORE_FT
Bumex 1 mg oral tablet: 1 tab(s) orally once a day  labetalol 100 mg oral tablet: 1 tab(s) orally 2 times a day  QUEtiapine: 12.5 milligram(s) orally every 12 hours  risperiDONE 0.5 mg oral tablet: 1 tab(s) orally 2 times a day

## 2020-08-04 NOTE — PROGRESS NOTE ADULT - REASON FOR ADMISSION
Altered Mental Status
Altered Mental Status, Emergent HD
Altered Mental Status

## 2020-08-04 NOTE — DISCHARGE NOTE PROVIDER - HOSPITAL COURSE
#Discharge: do not delete        Hospital Course:    Ms. 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 Coli). On arrival to the hospital, she had a K of 7.8 and BUN of 129 requiring emergent HD. She underwent three rounds of HD before regaining baseline mental status where she was able to communicate with us. When she was able to communicate with us, she expressed that she no longer wanted to live and refused further labs and HD. Her UA and Urine cultures were positive for a UTI that grew Klebsiella and E. Coli. We offered antibiotic treatment with ceftriaxone 1g IV q24h, but the patient refused treatment. She was also found to have cellulitis of the left lower extremity for which we offered antibiotics ceftriaxone 1g IV q24h, but she also refused treatment. During her hospital course, an extensive DVT of the femoral vein to the posterior tibial vein was discovered. We offered to start anticoagulation, but the patient refused. We also offered to place an IVC filter, which the patient also refused. Due to the patient's consistent refusal of medical treatment and expressed wishes that she did not want to live anymore, medical ethics, psychiatry, and palliative care were consulted. We also called her adult protective services  who explained the patient has history of paranoia, delusions, and poor decision making capacity. Pt has no family and has hx of leaving prior medical facilities AMA.  also mentioned patient had instances of prescription med abuse where she would try to take medications from residents of previous rehab/nursing facilities. Has also caused trouble at prior rehab/nursing facilities. As a result, medical ethics was contacted who said patient is deemed unsafe to leave AMA and patient does not have decision making capacity. Unable to provide treatment if patient refuses, but able to treat if situation becomes emergent/life-threatening. A multidisciplinary meeting w/ palliative care, ethics, and psychiatry with input from nephrology was held. It was agreed the patient has limited capacity to make medical decisions. Pt is steadfast in her beliefs of not continuing dialysis and also made apparent her views on dying naturally. As a result, it was decided that dialysis will not be pursued (at this point she does not need emergent HD) and focus on her comfort. In accordance with patient's wishes, a 2 physician consent for DNR/DNI status was performed. Patient is stable for discharge to hospice care.            Problem List:    #Acute renal failure superimposed on chronic kidney disease    Anuric KATHARINA on advanced CKD. KATHARINA most likely attributed to otc NSAID abuse in the setting of OA (according to records obtained from previous medical stay at [place]). CT suggests chronic atrophic kidneys likely d/t CKD. S/p HD x3.    -R. femoral cath placed 7/25/20    -offered HD, but PT refusing treatment    -offered placement of Permacath, but pt refusing procedure.             #Deep Vein Thrombosis of Femoral Vein of Left Lower Extremity:    US duplex venous lower extremity 7/27 showed extensive LLE DVT from common femoral vein to posterior tibial vein.     -offered heparin gtt, but pt refusing treatment    -offered IVC filter, but pt refusing surgery    -medical ethicist consulted, appreciate recs.             #Cellulitis    LLE warm, erythematous, non purulent, and swollen    -Offered CTX 1g IV q24, but pt refusing treatment.    -Oxycodone 5mg PO x1 for pain    -medical ethicist consulted, appreciate recs.             #UTI (urinary tract infection)    Urine Cx grew Klebsiella and E. Coli    -offered CTX 1g IV q24, but pt refusing treatment    -medical ethicist consulted, appreciate recs.             #Myoclonia    RUE jerking motion witnessed this afternoon. Unclear if it's new or worsening of tremors.     -vEEG showed no seizure activity, but showed increased epileptic potential in the posterior quadrant and diffuse/multifocal cerebral dysfunction.            Inpatient treatment course: Patient has been refusing all treatment offered. Offered Heparin gtt for DVT, CTX 1g IV for cellulitis and UTI, HD for CKD         Patient has been refusing all labs during hospitalization

## 2020-08-04 NOTE — CONSULT NOTE ADULT - CONSULT REASON
FOLLOW UP CONSULT (Refer to Ethics Consult from 7/28): Assist the team in an ethical dilemma posed by a 74-year-old female without benefit of surrogate whom is refusing all treatment.

## 2020-08-04 NOTE — PROGRESS NOTE ADULT - ASSESSMENT
Ms. 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 She was recently admitted in [place] after fall and discharged to [facility] after renal failure treatment and subsequently released home few days ago. Patient admitted for acute renal failure requiring urgent dialysis. Pt now refusing all treatment.     Palliative care consulted for goals of care.

## 2020-08-04 NOTE — PROGRESS NOTE ADULT - PROBLEM SELECTOR PLAN 8
Incidental discovery of pelvic lymphadenopathy on CTAP.   -IR consulted for biopsy. Recommend follow up as outpatient for non emergent lymph node biopsy (IR scheduling office [phone]).

## 2020-08-04 NOTE — PROGRESS NOTE ADULT - PROBLEM SELECTOR PLAN 4
Anuric KATHARINA on advanced CKD. KATHARINA most likely attributed to otc NSAID abuse in the setting of OA (according to records obtained from previous medical stay at [place]). CT suggests chronic atrophic kidneys likely d/t CKD. S/p HD x3.  -Nephro consulted, appreciate recs  -R. femoral cath placed 7/25/20  -offered HD, but PT refusing treatment  -offered placement of Permacath, but pt refusing procedure

## 2020-08-04 NOTE — CONSULT NOTE ADULT - SUBJECTIVE AND OBJECTIVE BOX
Follow up note from Ethics Consult on 7/28.    The 2019 [facility] Family Health Care Decisions Act (FHCDA) addresses the situation of a sick individual who lacks capacity and has no available surrogate. The FHCDA requires hospitals, after a patient is admitted, to determine if the patient has a health care agent, guardian, or a person who can serve as the patient's surrogate. If the patient has no such person and lack capacity, such as in this case, the hospital must identify, to the extent practical, the patient's wishes and preferences about pending health care decisions. WHERE THE PATIENT CONTINUES TO BE INCAPACITATED, THE ATTENDING PHYSICIAN MAY AUTHORIZE MAJOR MEDICAL TREATMENT AFTER CONSULTATION AND CONCURRENCE WITH THE PATIENT' HEALTH CARE TEAM AND AN INDEPENDENT PHYSICIAN NOT INVOLVED WITH THE PATIENT'S CARE CONCURS WITH THE TREATMENT. In this case, the patient is ill and is currently incapacitated due to her lack of insight into her medical condition. There is no known family or other surrogate decision makers at this time.    The health team is commended for their virtue in providing care that has allowed Ms. Salazar to survive to this point. In this specific case, in accordance with the PALLIATIVE CARE ACCESS ACT ([facility] SECTION 2997-D), the attending health care practitioner is required to provide patients with a terminal illness not only prognosis, risks and benefits of treatment options but also patient's legal rights to symptom management at the end of life. Fundamental to a palliative approach is the aim to reduce suffering and improve the quality of life for dying patients. In Ms. Salazar's case, she is at end of life as indicated by her clinical picture.    Assembly Bill  signed on August 13, 2015 amended Public Health Law (PHL) 2994-g Subdivision 5-a2 and states that an attending physician shall be authorized to make decisions regarding hospice care and execute appropriate documents for such decisions (including a hospice election form) for an adult patient who is hospice eligible.     Both [facility] law, CDA, and current ethical thinking support the right of two physicians to make medical decisions that include secondary treatment decisions like hospice care. A decision to order hospice care must meet the appropriate criteria. It is ethically appropriate to order hospice care during this admission as the patient's trajectory is at the end of life and palliation is warranted.    Denisha Mary MS  Medical Ethicist  [phone]

## 2020-08-04 NOTE — CONSULT NOTE ADULT - REASON FOR ADMISSION
Altered Mental Status

## 2020-08-04 NOTE — DISCHARGE NOTE NURSING/CASE MANAGEMENT/SOCIAL WORK - PATIENT PORTAL LINK FT
You can access the FollowMyHealth Patient Portal offered by [facility] by registering at the following website: [email or web address]. By joining [facility]’s FollowMyHealth portal, you will also be able to view your health information using other applications (apps) compatible with our system.

## 2020-08-04 NOTE — PROGRESS NOTE ADULT - PROBLEM SELECTOR PLAN 2
LLE warm, erythematous, non purulent, and swollen. Offered CTX 1g IV q24, but pt refusing treatment. Pt now open to PO medication due to pain.   -Cefpodoxime 400mg PO q24h  -Oxycodone 5mg PO x1 for pain  -medical ethicist consulted, appreciate recs

## 2020-08-04 NOTE — PROGRESS NOTE ADULT - PROVIDER SPECIALTY LIST ADULT
Epilepsy
Internal Medicine
Nephrology
Palliative Care
Vascular Surgery
Vascular Surgery
Internal Medicine
Nephrology
Internal Medicine
Internal Medicine

## 2020-08-04 NOTE — DISCHARGE NOTE PROVIDER - NSDCCPCAREPLAN_GEN_ALL_CORE_FT
PRINCIPAL DISCHARGE DIAGNOSIS  Diagnosis: Acute renal failure  Assessment and Plan of Treatment: You were admitted to the hosptial for acute renal failure. Your potassium was elevated at 7.8 and  requiring emergent dialysis. We attribute your renal failure to chronic NSAID use due to your chronic osteoarthritis. Since your kidneys are no longer functioning normally, abnormal levels of electrolytes and metabolic waste will accumulate in your blood. This can be extremely dangerous without undergoing hemodialysis. We continued to offer you hemodialysis, but you continued to refuse treatment.      SECONDARY DISCHARGE DIAGNOSES  Diagnosis: Deep vein thrombosis (DVT) of femoral vein of left lower extremity, unspecified chronicity  Assessment and Plan of Treatment: During your hospital stay    Diagnosis: UTI (urinary tract infection)  Assessment and Plan of Treatment:     Diagnosis: Cellulitis  Assessment and Plan of Treatment: Cellulitis PRINCIPAL DISCHARGE DIAGNOSIS  Diagnosis: Acute renal failure  Assessment and Plan of Treatment: You were admitted to the hosptial for acute renal failure. Your potassium was elevated at 7.8 and  requiring emergent dialysis. We attribute your renal failure to chronic NSAID use due to your chronic osteoarthritis. Since your kidneys are no longer functioning normally, abnormal levels of electrolytes and metabolic waste will accumulate in your blood. This can be extremely dangerous without undergoing hemodialysis. We continued to offer you hemodialysis, but you continued to refuse treatment. By refusing hemodialysis, you put yourself at risk for accumulating toxic levels of electrolytes in your blood that can cause many systemic complications that can become llife threatening.      SECONDARY DISCHARGE DIAGNOSES  Diagnosis: Deep vein thrombosis (DVT) of femoral vein of left lower extremity, unspecified chronicity  Assessment and Plan of Treatment: During your hospital stay a venous duplex doppler of your lower extremities was performed and an extensive deep vein thrombosis from your common femoral vein to the posterior tibial vein was discovered. This puts you at an increased risk of a pulmonary embolism, which is a life threatening condition. We offered you anticoagulant treatment and/or the placement of an IVC filter, both of which you refused.    Diagnosis: UTI (urinary tract infection)  Assessment and Plan of Treatment: You were discovered to have a urinary tract infection that was positive for Klebsiella and E. coli. We offered to treat you with antibiotics, but you refused. By refusing antibiotics, you put yourself at risk for progression to pyelonephritis, bacteremia, and septic shock.    Diagnosis: Cellulitis  Assessment and Plan of Treatment: You were discovered to have a skin infection of your left lower extremity. We offered to treat you with antibiotics, but you refused. By refusing antibiotics, you put yourself at risk for pregression to necrosis, bacteremia, and septic shock.

## 2020-08-04 NOTE — PROGRESS NOTE ADULT - SUBJECTIVE AND OBJECTIVE BOX
PRITESH BAL             MRN-[id number]    CC: Altered mental status    HPI:  Ms. Bal is a 73 yo female with a PMHx notable for hemorrhagic CVA 9/2013 with residual left sided weakness and HTN, b/l OA knees, CKD, who was BIBEMS for AMS. She was recently admitted in [place] after fall and discharged to [facility] after renal failure treatment and subsequently released home few days ago. Her doorman called EMS as he had not seen her for past 2 days and she wasn't picking up food left at her door. She was found down in her apartment and was unable to give any information to EMS. Of note, she is unable to give a clear history at time of interview. ROS could not be obtained due to clinical and mental status.     ED Course:  VS: Tmax: 97.2, HR: 48, BP: 117/81 , RR: 16, O2: 100%. When patient was examined in the ED, she was alert, awake but confused, non cooperative shaky.  Labs was notable for leukocytosis, normal lactate, acute renal failure with anion gap acidosis and hyperkalemia cr 9.03, K 7.8 and Bicarb 17, . ECG with peaked t waves. started insulin/glucose, Lokelma, bicarb, calcium iv, lokelma. K corrected to 6.6  ivf administered, zabala placed.  purulent urine noted.  started ceftriaxone.  1L ns given.  also with troponin elevation, Nephrology consulted for urgent ED and vascular surgery consulted for future fistula placement. She was admitted for urgent HD medicine telemetry. (25 Jul 2020 17:15)      SUBJECTIVE: Pt awake and alert. Complains of pain in her skin on her legs. Unable to elaborate on pain. Requesting orange juice.      Dyspnea (Carmencita 0-10): 0               N/V (Y/N): N                             Secretions (Y/N) : N               Agitation(Y/N): N  Pain (Y/N): Yes, pt unable to elaborate on pain beyond location    Other systems reviewed are negative.    PEx:  T(C): 36.8 (07-31-20 @ 06:01), Max: 37.3 (07-30-20 @ 20:34)  HR: 90 (07-31-20 @ 09:15) (90 - 126)  BP: 102/73 (07-31-20 @ 06:01) (102/73 - 113/75)  RR: 17 (07-31-20 @ 06:01) (17 - 18)  SpO2: 97% (07-31-20 @ 06:01) (95% - 97%)    General: lethargic, no acute distress  HEENT: temporal wasting  CVS: RRR, +S1/S2  Resp: nonlabored respirations  Skin: Large, right supraclavicular mass, LE edema and erythema R >L    PPSV: 40-50%  http://www.npcrc.org/files/news/karnofsky_performance_[email or web address]   http://[email or web address]/files/news/[email or web address]_performance_scale_[email or web address].pdf  Cachexia (Y/N): N  BMI: 30.4    ALLERGIES: IV Contrast (Anaphylaxis)  Levaquin (Angioedema)  penicillin (Angioedema)      OPIATE NAÏVE (Y/N): N    MEDICATIONS: REVIEWED  MEDICATIONS  (STANDING):  cefTRIAXone   IVPB 1000 milliGRAM(s) IV Intermittent every 24 hours  heparin  Infusion 1500 Unit(s)/Hr (15 mL/Hr) IV Continuous <Continuous>  risperiDONE   Tablet 0.5 milliGRAM(s) Oral two times a day    MEDICATIONS  (PRN):  acetaminophen   Tablet .. 650 milliGRAM(s) Oral every 6 hours PRN Temp greater or equal to 38C (100.4F), Mild Pain (1 - 3)  albumin human 25% IVPB 50 milliLiter(s) IV Intermittent every 1 hour PRN to maintain sbp> 100 mmhg intradialysis  QUEtiapine 12.5 milliGRAM(s) Oral every 12 hours PRN Agitation      LABS: REVIEWED  CBC:                        10.4   12.01 )-----------( 146      ( 30 Jul 2020 07:56 )             32.8     CMP:    07-30    133<L>  |  94<L>  |  59<H>  ----------------------------<  105<H>  5.3   |  24  |  5.13<H>    Ca    8.4      30 Jul 2020 07:54  Phos  4.6     07-30  Mg     2.2     07-30    Albumin, Serum: 3.2 g/dL (07-26-20 @ 06:10)      IMAGING: REVIEWED    Advanced Directives:     Full Code    Decision maker: Pt demonstrates  Legal surrogate: Nita Hawley (Adult protective services)   	     PSYCHOSOCIAL-SPIRITUAL ASSESSMENT:       Care plan unchanged           GOALS OF CARE DISCUSSION    7/31: Multidisciplinary team meeting with primary medicine team, psychiatry and medical ethics. Per our discussion we agree that patient has capacity to refuse hemodialysis and major interventions such as surgery. Attempted to discuss disposition planning with patient. Pt not receptive at this time. Will attempt to have further discussion at later time.         8/4: Attempted to discuss disposition with patient with palliative SW present. Patient unwilling to discuss and states its "none of your business". Attempted multiple times to discuss next steps and pt became irritated. Asked us to leave the room and told us to speak with Senator Crum who helped pt get housing in the past. SW to call senator.      Advance Care Planning Note	           See previous Palliative Medicine Note       Documentation of GOC:    AGENCY CHOICE DISCUSSED:        Pt unwilling to discuss disposition. 2 physician referral to hospice.        REFERRALS	        Unit SW/Case Mgmt       Palliative SW       Ethics

## 2020-08-04 NOTE — PROGRESS NOTE ADULT - COVID-19 NEGATIVE LAB RESULT
COVID-19 ruled out

## 2020-08-04 NOTE — PROGRESS NOTE ADULT - PROBLEM SELECTOR PLAN 5
Multidisciplinary team meeting on 7/31. Team in agreement that patient has capacity to refuse dialysis.  MOLST completed, 2 physician DNR/DNI.  Hospice care appropriate. Referral made to [facility], will f/u with LUIS. Ethics consult appreciated.

## 2020-08-04 NOTE — PROGRESS NOTE ADULT - ATTENDING COMMENTS
Patient seen and examined.  Agree with fellow note.  Patient to be transferred to [place] today.  Ongoing symptom management.

## 2020-08-06 LAB
CULTURE RESULTS: SIGNIFICANT CHANGE UP
ORGANISM # SPEC MICROSCOPIC CNT: SIGNIFICANT CHANGE UP
SPECIMEN SOURCE: SIGNIFICANT CHANGE UP

## 2020-11-30 NOTE — PROGRESS NOTE ADULT - PROBLEM SELECTOR PLAN 1
Your Child's Health  18-Month-Old Visit      Jenna Bess  November 30, 2020    Visit Vitals  Temp 98.4 °F (36.9 °C) (Axillary)   Ht 30\" (76.2 cm)   Wt 12 kg   HC 48.2 cm (19\")   BMI 20.65 kg/m²     Weight: 26.43 lbs      YOUR CHILD’S 18 MONTH OLD VISIT       Key points at this age…  • Jenna should continue to ride in a properly-installed car seat in the back seat. Correct use of a car seat is always critically important for your child’s safety. For maximum safety, keep the seat rear facing until your child reaches the top height or weight limit allowed by the car seat .        • Help your child’s language develop by talking to them with clear, simple words and reading lots of books together. Electronic media (TV, pads, phones, computers) are not recommended at this age.     • Take care of those teeth! Help your child brush twice daily with a tiny amount of regular (not baby) toothpaste. Avoid sugary and sticky foods and sweetened drinks like juice and soda. If you give your child juice, limit it to no more than 4 ounces a day of 100% juice.      Nutrition & Healthy Weight:   Healthy eating is a challenge for everyone, especially for busy parents of toddlers who can often be picky and hard to please! But it is important-- nearly 1 in 3 children in our country is overweight or obese which poses serious health risks for them as they get older.      Here are some things to think about at this age:   Water and milk are the healthiest drink choices for your children.      Avoid junk food and sweet things-- fried fast food, bagged snacks, candy, fruit snacks, sugary cereals, juice, Ramez-A[employer], etc. Once your child develops a “sweet tooth” for these things, they will always be asking for them. Too much sugar is bad for their teeth and their overall nutrition and weight. Children this age should have no more than 4 ounces of 100% fruit juice daily. Do not water it down in a bottle or sippy cup that  they can carry around and drink from over an extended period of time; this frequent exposure to the sugars in juice (even if diluted with water) just increases their risk of tooth decay.       Don’t push your children to eat when they do not want to. Offer them small portions at a time; they can have seconds when they eat everything they were offered. Serving large portions may result in eating more than they need (and waste!).      Toddlers often resist new foods, but keep trying! Most will eventually try something new after it is offered several times. (Don’t get into the habit of serving only what they like!)    Even when eating a very well balanced diet, children need some extra vitamin D. A liquid preparation of pure vitamin D (400 or 600 IU) or a multivitamin with iron drop is recommended.  (They are currently too young for a chewable vitamin because they could choke on those.)     DENTAL CARE:   By now you should be brushing your child’s teeth twice daily, especially at bedtime. (It’s okay if they think you are “just helping” them!) Brush with a tiny smear of regular (fluoridated) toothpaste (not baby toothpaste).  Using city water to drink and cook with provides important fluoride to strengthen and protect teeth against cavities. If you have well water instead of a city water supply, however, you should have it tested for fluoride content to determine whether your child might need fluoride supplements.     DEVELOPMENT & BEHAVIOR:  Toddlers will be learning lots of new skills at this age and over the next several months! They should be walking well and often running; most will try to climb stairs (if you let them!). They may help get themselves undressed, scribble, and use a cup and spoon fairly well. They should have at least 6 words, will gesture and point to show someone what they want. They will “pretend” their dolls or stuffed animals are real, and they will hopefully know the name of their favorite  book. Over the next several months, help them learn by pointing to and naming pictures in books as well as their own body parts. Talking, singing and reading together are great ways to advance your baby’s language skills!      The American Academy of Pediatrics (AAP) does not recommend any “screen time” (TV, videos, pads, phones) except for video chatting before age 18 months. If parents of children between 18 months and 2 years want to start some screen time, they should choose high-quality programming (the AAP suggests work created by [employer] or [employer]).  Watching should be limited to very brief time periods (minutes, not hours), and parents should watch these programs with their child to help children understand what they are seeing--children should not be given devices to watch on their own.     Most children are not developmentally ready for potty-training until closer to 3 years old. If your child seems interested, it is fine to get them a potty chair to use (and provide lots of praise when they use it!). Just remember their young body is not likely to be developed enough at this age for them to be fully potty trained at this time.       TEMPER TANTRUMS:  These begin about the time a child starts walking and can continue until they are 3½ years old (or older). Toddlers tantrum because they are frustrated that they can’t say what they want or need or they are angry because they cannot get or have something they want.  The most effective method of dealing with tantrums is to ignore them and wait for it to pass. Once your child is calm, simply direct them to a new activity. Don’t try to “discuss” their tantrum (they don’t get that!) and certainly do not promise them a reward for stopping their crying. Having (and enforcing) consistent rules and giving  your child a lot of positive attention when they are behaving well (“time in”) will promote good behavior and reduce tantrums. When your child is misbehaving  (hitting, biting), brief time-outs (which essentially means ignoring your child for a brief period) work because your child wants your attention more than anything else.     CAR SAFETY: An approved car seat in the back seat is required by [place] law. Your child is safest in a rear-facing convertible car seat right now: keep the seat in a rear-facing position until your child reaches the top height or weight limit allowed by the car seat . (Really! Even if your child is tall and they have to keep their legs bent, they are still safer when rear facing. And kids generally do not mind having their legs bunched up-- that really seems to bother parents more than it bothers kids!)     SAFETY & ACCIDENT PREVENTION:  By this age, your home should be pretty well safety-proofed. Here are some reminders about possible safety risks for curious, active toddlers!    1. Burns:  Keep dangerous items out of reach, including hot liquids, hot foods, and electrical cords on appliances such as irons, toasters, and coffee pots--children can pull on cords and suffer serious burns if they pull something hot down onto themselves. Have a family fire safety plan, including regular smoke detector (and carbon monoxide detector) battery checks, working fire extinguishers, and escape routes.  2. Falls: Keep echavarria on stairs and window latches on upper-story windows.  3. Water Safety:  Children can drown in just a couple inches of water, so never leave a toddler alone in a tub. Also, do not rely on older children to properly supervise the younger ones. Children this young are not developmentally ready for swimming lessons, and life jackets and “swimmies” will NOT protect your child from drowning! When near water, children need constant supervision by an adult who knows how to swim. Permanent pools (in ground and above ground) should be fenced off (and locked), and wading pools should be drained when not in use. Keep large buckets  empty and keep toilet seat lids down and secured with a safety lock if possible.   4.  Poisoning:  Continue to keep all cleaning and chemical products safely stored out of sight and out of reach. (Check for what is under your bathroom sink as well as your kitchen sink.) Keep purses (your own and ones belonging to visitors) out of reach since curious toddlers can quickly find medicines, cigarettes, and small objects to choke on! Keep the original bottles and safety caps for all medicines, including vitamins; do not transfer medicines to non-child-proofed or unlabeled containers. Be especially careful when around older people because they may keep their medicines in clear sight or in non-childproofed containers.   Is this number in your cell phone? Call the Poison Center at 1-619.643.6406 for any known or suspected poisoning.      SMOKING:  Continue to protect your child from cigarette smoke. Exposure to smoke will increase their risk of asthma, ear infections, and respiratory infections (pneumonia). If you smoke and are ready to consider quitting, talk to your doctor. Nicotine replacement products can be very helpful in breaking this tough addiction.     LEAD EXPOSURE:    If you live in [place], your child should be screened for lead now if they were not checked at age 12 months. The [facility] Department recommends screening children three times in the first 3 years of life. If you do not live in [place], talk to your doctor about lead screening if any of the following apply: (1) your child currently (or had previously) lives in or frequently visits a house or building built before 1950 (including , grandparent homes, etc); (2) your child currently (or had previously) lives in or frequently visits a house or building built before 1978 with recent or ongoing renovations; (3) your child has a brother, sister or playmate who has had lead poisoning; (4) your child is enrolled in Medicaid or WIC.  LE pain likely 2/2 cellulitis  continue management per primary team Very rarely, other sources of lead exposure can include herbal remedies or imported items (mini blinds, canned goods). Do an internet search for “[facility] Department Lead” for helpful information about lead risks in [place]. If you have questions about older neighborhoods in [place] that might have lead connecting (or service) pipes, do an internet search for \"[place] lead awareness and drinking water safety\". From this web page, you can search for your address to find out if your home was built with lead service lines. There are also helpful hints regarding water safety on this site.     SUN EXPOSURE:  Keep your child in shade and out of direct sun as much as you can. Protective clothing as well as hats and sunglasses help protect against sunburn and skin damage. When your child is going to be outside, always use a “broad spectrum” sunscreen (which means it protects against both UVA and UVB rays) with an SPF of 15 to 30; apply it to your child’s skin at least 20 to 30 minutes before they go out. Kids this age often like the zinc oxide (or titanium dioxide) products which are good for sensitive spots (nose, cheeks, ears, shoulders); these don’t “rub in” all the way, but kids often like the fun colors they come in!      MEDICATION FOR FEVER OR PAIN:   Acetaminophen liquid (e.g., Tylenol or Tempra) may be given every four hours as needed for pain or fever.  Acetaminophen liquid is less concentrated than the infant dropper bottle type.  Be sure to check which product CONCENTRATION you are using.    INFANT Tylenol/Acetaminophen  Drops (160 mg/5 mL)    Child’s Weight: Dose:  24 - 35 pounds:   160 mg (5.0 mL (1 Teaspoon))    CHILDREN’S Tylenol/Acetaminophen  (160 mg/5 mL)    Child’s Weight: Dose:  24 - 35 pounds:   160 mg (5.0 mL (1 Teaspoon))    CHILDREN'S Ibuprofen (100 mg/5 mL) liquid (for example, Advil or Motrin) may be given every 6 hours as needed for pain or fever.    Child’s Weight: Dose:  24 - 35  pounds:   100 mg (5.0 mL(1Teaspoon))    If Avalyse is outside this weight range, call your pediatrician's office for advice      Most Recent Immunizations   Administered Date(s) Administered   • DTaP(INFANRIX) 08/24/2020   • DTaP/Hep B/IPV 2019   • Hep A, ped/adol, 2 dose 05/18/2020   • Hep B, adolescent or pediatric 2019   • Hib (PRP-OMP) 08/24/2020   • Influenza, injectable, quadrivalent, preservative-free 2019   • MMR 05/18/2020   • Pneumococcal Conjugate 13 valent 08/24/2020   • Rotavirus - monovalent 2019   • Varicella 05/18/2020   Pended Date(s) Pended   • Hep A, ped/adol, 2 dose 11/30/2020   • Influenza, injectable, quadrivalent, preservative-free 11/30/2020   Deferred Date(s) Deferred   • Hepatitis B Immune Globulin 2019       If Avalyse develops any of the following reactions within 72 hours after an immunization, notify your pediatrician by calling the pediatric phone nurse:  1. A temperature of 105 degrees or above.  2. More than 3 hours of continuous crying.  3. A shrill, high-pitched cry.  4. A pale, limp spell.  5. A seizure or fainting spell. In this case, you should call 911 or go immediately to the emergency room.    NEXT VISIT:  2 YEARS OF AGE    Thank you for entrusting your care to [facility] [place].      Also, check out “Children’s Health” on the [facility] [place] Blog for updates on timely topics regarding children’s health!

## 2020-12-01 NOTE — DIETITIAN INITIAL EVALUATION ADULT. - NUTRITIONGOAL OUTCOME1
[facility] Progress Note    Date: 2020    Name: Lesli Ellison    MRN: [id number]         : 1958     Xolair Injection every 2 weeks    Ms. Julia Miller arrived ambulatory and in no acute distress at 1515. Patient denied complaints of pain/discomfort, changes in medication regimen or health condition. Ms. Julia Miller was assessed and education was provided. Ms. Onesimo Colunga vitals were reviewed. Visit Vitals  BP (!) 140/57 (BP 1 Location: Left arm, BP Patient Position: At rest;Sitting)   Pulse 67   Temp 98.8 °F (37.1 °C)   Resp 18   SpO2 99%     Xolair 150 mg was administered as ordered SQ in patient's posterior right upper arm. Xolair 75 mg was administered as ordered SQ in patient's posterior right upper arm. Total dose administered was 225 mg. Ms. Julia Miller tolerated well without complaints. Patient armband removed and shredded. Ms. Julia Miller was discharged from [place] [id number] in stable condition at 1355. She is to return on 12/15/2020 at 36 for her next appointment.     Ashwini Mckee RN  2020  1356
Meet >75% of needs consistently

## 2023-02-22 NOTE — CONSULT NOTE ADULT - ATTENDING COMMENTS
I saw the patient and soaked her hand Opzelura Counseling:  I discussed with the patient the risks of Opzelura including but not limited to nasopharngitis, bronchitis, ear infection, eosinophila, hives, diarrhea, folliculitis, tonsillitis, and rhinorrhea.  Taken orally, this medication has been linked to serious infections; higher rate of mortality; malignancy and lymphoproliferative disorders; major adverse cardiovascular events; thrombosis; thrombocytopenia, anemia, and neutropenia; and lipid elevations.

## 2023-03-01 NOTE — ED ADULT TRIAGE NOTE - CCCP TRG CHIEF CMPLNT
Chief Complaint:   Chief Complaint   Patient presents with    Hip Pain     Left hip where the pin is sharp pain on the side of the hip, also pain getting in and out of bed    Knee Pain     Left knee pain gets stiffness and pain has fallen twice. Sharp pain above the knee when she goes to sit down. aJimie Jin follows up for her left hip and also left anterior distal thigh. She had the cephalomedullary nail in the left hip for fracture sometime ago. She had some discomfort with that. She still has some discomfort and indicates the area proximal and slightly posterior to the prominence of the greater trochanter. She has pretty good range of motion in the hip. Certain movements and certain positions bother her more. He also has some discomfort in the distal left thigh and indicates the areas of the vastus medialis distally. She had a total knee replacement done about 9 years ago by a doctor in [facility]. She had some problems with that with the fusion for some time. That seems to have dissipated. Occasionally feels that the legs going to give out. Allergies; medications; past medical, surgical, family, and social history; and problem list have been reviewed today and updated as indicated in this encounter seen below. Exam: There is mild tenderness to it around the greater trochanter on the left slightly posterior to mid lateral line. Decisions from the surgery are well-healed. She does not seem to have discomfort at the entry point of the lag screw which is little prominent beyond the lateral cortex. It is not easily palpable. There is discomfort in the vastus medialis distally in the left thigh. She has no defects in the musculature. She has range of motion in the knee of near 0 to 95 degrees. Collaterals are stable. Post motor function is intact.     Radiographs: Previous imaging of the left hip was reviewed and shows slight heterotopic ossification near the tip of the greater altered mental status trochanter. There is a prominence of the lag screw laterally. The fracture appears to be well-healed in good alignment. Ridging of the left knee and 2 view shows what appears to be a stable cemented total left knee replacement arthroplasty in good alignment. There is a Mytec anchor in the anterior proximal tibia perhaps from patellar tendon repair. ASSESSMENT:    Jose Ramsay was seen today for hip pain and knee pain. Diagnoses and all orders for this visit:    Left hip pain  -     [place] ARTHROCENTESIS ASPIR&/INJ MAJOR JT/BURSA W/O US    Chronic pain of left knee  -     XR KNEE LEFT (1-2 VIEWS); Future    Closed displaced intertrochanteric fracture of left femur with delayed healing, subsequent encounter  -     External Referral To Orthotics    Other orders  -     triamcinolone acetonide (KENALOG-40) injection 40 mg        PLAN: After discussing her symptoms a reviewed some treatment options with Jose Ramsay. She seized upon injection for the trochanteric area to try to get rid of her discomfort. Injectionof the left trochanteric bursa with  4 cc    1/4  % bupivicaine  and Kenalog   (triamcinalone)         40mgwas discussed with the patient. Discussion included but was not limited to potential risks and benefits. No contraindications to the procedure were noted. Understanding and agreement was indicated. The procedure was accomplished without incident using appropriate aseptic technique. follow up as needed    Return if symptoms worsen or fail to improve.        Current Outpatient Medications   Medication Sig Dispense Refill    TRULICITY 4.5 ES/3.7LK SOPN       JARDIANCE 25 MG tablet       calcium carbonate-vitamin D (CALTRATE) 600-400 MG-UNIT TABS per tab Take 1 tablet by mouth daily      Cholecalciferol (VITAMIN D3) 1.25 MG (56576 UT) CAPS TAKE 1 CAPSULE BY MOUTH EVERY WEEK      pantoprazole (PROTONIX) 40 MG tablet TAKE ONE TABLET BY MOUTH EVERY DAY      glimepiride (AMARYL) 4 MG tablet Take 4 mg by mouth every morning (before breakfast) bid      Dulaglutide (TRULICITY SC) Inject into the skin      furosemide (LASIX) 20 MG tablet Take 20 mg by mouth 2 times daily      diclofenac (VOLTAREN) 75 MG EC tablet Take 75 mg by mouth 2 times daily      alendronate (FOSAMAX) 70 MG tablet Take 70 mg by mouth every 7 days      Turmeric 500 MG CAPS Take by mouth      calcium carbonate 600 MG TABS tablet Take 1 tablet by mouth daily      BIOTIN PO Take by mouth      pregabalin (LYRICA) 75 MG capsule Take 75 mg by mouth 2 times daily      Multiple Vitamins-Minerals (THERAPEUTIC MULTIVITAMIN-MINERALS) tablet Take 1 tablet by mouth daily Indications: with vision      ketorolac (TORADOL) 10 MG tablet Take 1 tablet by mouth every 6 hours as needed for Pain Patient had IM Toradol here in the ER. 12 tablet 0     No current facility-administered medications for this visit. Patient Active Problem List   Diagnosis    Other osteoporosis without current pathological fracture    Senile osteoporosis       Past Medical History:   Diagnosis Date    Diabetes mellitus ([facility] [address]     IDDM    Osteoporosis     fosamax    Restless leg syndrome     S/P total knee replacement     left knee       Past Surgical History:   Procedure Laterality Date    BREAST BIOPSY       SECTION      CHOLECYSTECTOMY      FRACTURE SURGERY      JOINT REPLACEMENT      bilaterally    KNEE SURGERY  2011    torn meniscus       No Known Allergies    Social History     Socioeconomic History    Marital status:      Spouse name: None    Number of children: None    Years of education: None    Highest education level: None   Tobacco Use    Smoking status: Never    Smokeless tobacco: Never   Substance and Sexual Activity    Alcohol use: No    Drug use: No    Sexual activity: Yes       Review of Systems  As follows except as previously noted in HPI:  Constitutional: Negative for chills, diaphoresis, fatigue, fever and unexpected weight change.    Respiratory: Negative for cough, shortness of breath and wheezing. Cardiovascular: Negative for chest pain and palpitations. Neurological: Negative for dizziness, syncope, cephalgia. GI / : negative  Musculoskeletal: see HPI       Objective:   Physical Exam   Constitutional: Oriented to person, place, and time. and appears well-developed and well-nourished. :   Head: Normocephalic and atraumatic. Eyes: EOM are normal.   Neck: Neck supple. Cardiovascular: Normal rate and regular rhythm. Pulmonary/Chest: Effort normal. No stridor. No respiratory distress, no wheezes. Abdominal:  No abnormal distension. Neurological: Alert and oriented to person, place, and time. Skin: Skin is warm and dry. Psychiatric: Normal mood and affect.  Behavior is normal. Thought content normal.    GORDON Singh DO    3/1/23  9:18 AM

## 2023-05-12 NOTE — PROGRESS NOTE ADULT - PROBLEM SELECTOR PLAN 8
Is the patient currently in the state of [place]? YES    Visit mode:VIDEO    If the visit is dropped, the patient can be reconnected by: VIDEO VISIT: Send to e-mail at: [email or web address]    Will anyone else be joining the visit? NO      How would you like to obtain your AVS? MyChart    Are changes needed to the allergy or medication list? YES, pt states they also have an allergy to oat dust    Reason for visit: Video Visit (Medication refills )           pt withdrawal in pain in LE  Continue to monitor for cellulitis 0.8cm solid nodule in lateral R breast noted on CT.   -Correlate w/ outpatient mammogram

## 2025-07-07 NOTE — PATIENT PROFILE ADULT. - PRO ARRIVE FROM
Please advice  
  Medication(s) due for refill (clinical team to verify): Yes    Last Visit: 3/5/2025    Next visit:  8/18/2025    Medication Ordered: pending with requested Pharmacy   
Patient's mother states pharmacy was not able to transfer it. She said pharmacy told her it should not be a problem if provider sends it directly. She is asking if  can at least try to send it to the location in [place].  
home